# Patient Record
Sex: MALE | Race: WHITE | NOT HISPANIC OR LATINO
[De-identification: names, ages, dates, MRNs, and addresses within clinical notes are randomized per-mention and may not be internally consistent; named-entity substitution may affect disease eponyms.]

---

## 2017-04-13 PROBLEM — Z00.00 ENCOUNTER FOR PREVENTIVE HEALTH EXAMINATION: Status: ACTIVE | Noted: 2017-04-13

## 2017-04-26 ENCOUNTER — OTHER (OUTPATIENT)
Age: 77
End: 2017-04-26

## 2017-04-26 DIAGNOSIS — M25.569 PAIN IN UNSPECIFIED KNEE: ICD-10-CM

## 2017-05-04 ENCOUNTER — APPOINTMENT (OUTPATIENT)
Dept: ORTHOPEDIC SURGERY | Facility: CLINIC | Age: 77
End: 2017-05-04

## 2017-05-04 VITALS
SYSTOLIC BLOOD PRESSURE: 107 MMHG | HEART RATE: 77 BPM | BODY MASS INDEX: 32.07 KG/M2 | WEIGHT: 242 LBS | HEIGHT: 73 IN | DIASTOLIC BLOOD PRESSURE: 65 MMHG

## 2017-05-04 DIAGNOSIS — Z87.39 PERSONAL HISTORY OF OTHER DISEASES OF THE MUSCULOSKELETAL SYSTEM AND CONNECTIVE TISSUE: ICD-10-CM

## 2017-05-04 DIAGNOSIS — Z78.9 OTHER SPECIFIED HEALTH STATUS: ICD-10-CM

## 2017-05-04 DIAGNOSIS — M17.11 UNILATERAL PRIMARY OSTEOARTHRITIS, RIGHT KNEE: ICD-10-CM

## 2017-05-04 DIAGNOSIS — Z87.09 PERSONAL HISTORY OF OTHER DISEASES OF THE RESPIRATORY SYSTEM: ICD-10-CM

## 2017-05-04 DIAGNOSIS — Z86.69 PERSONAL HISTORY OF OTHER DISEASES OF THE NERVOUS SYSTEM AND SENSE ORGANS: ICD-10-CM

## 2017-05-04 DIAGNOSIS — Z86.73 PERSONAL HISTORY OF TRANSIENT ISCHEMIC ATTACK (TIA), AND CEREBRAL INFARCTION W/OUT RESIDUAL DEFICITS: ICD-10-CM

## 2017-05-04 RX ORDER — FAMOTIDINE 20 MG/1
20 TABLET, FILM COATED ORAL
Refills: 0 | Status: ACTIVE | COMMUNITY

## 2017-05-04 RX ORDER — FLUTICASONE PROPIONATE 50 MCG
50 SPRAY, SUSPENSION NASAL
Refills: 0 | Status: ACTIVE | COMMUNITY

## 2017-05-04 RX ORDER — ALBUTEROL SULFATE 90 UG/1
108 AEROSOL, METERED RESPIRATORY (INHALATION)
Refills: 0 | Status: ACTIVE | COMMUNITY

## 2017-05-04 RX ORDER — LORATADINE 10 MG/1
10 TABLET, ORALLY DISINTEGRATING ORAL
Refills: 0 | Status: ACTIVE | COMMUNITY

## 2017-05-04 RX ORDER — MULTIVIT-MIN/IRON/FOLIC ACID/K 18-600-40
CAPSULE ORAL
Refills: 0 | Status: ACTIVE | COMMUNITY

## 2017-05-04 RX ORDER — ASPIRIN 81 MG/1
81 TABLET ORAL
Refills: 0 | Status: ACTIVE | COMMUNITY

## 2017-05-04 RX ORDER — CLOPIDOGREL 75 MG/1
75 TABLET, FILM COATED ORAL
Refills: 0 | Status: ACTIVE | COMMUNITY

## 2017-05-04 RX ORDER — BUPROPION HYDROCHLORIDE 150 MG/1
150 TABLET, FILM COATED, EXTENDED RELEASE ORAL
Refills: 0 | Status: ACTIVE | COMMUNITY

## 2017-05-04 RX ORDER — ATORVASTATIN CALCIUM 20 MG/1
20 TABLET, FILM COATED ORAL
Refills: 0 | Status: ACTIVE | COMMUNITY

## 2017-07-04 ENCOUNTER — EMERGENCY (EMERGENCY)
Facility: HOSPITAL | Age: 77
LOS: 1 days | Discharge: HOME | End: 2017-07-04
Attending: EMERGENCY MEDICINE
Payer: MEDICARE

## 2017-07-04 VITALS
TEMPERATURE: 98 F | OXYGEN SATURATION: 95 % | HEIGHT: 73 IN | WEIGHT: 250 LBS | RESPIRATION RATE: 18 BRPM | SYSTOLIC BLOOD PRESSURE: 123 MMHG | HEART RATE: 57 BPM | DIASTOLIC BLOOD PRESSURE: 81 MMHG

## 2017-07-04 VITALS
OXYGEN SATURATION: 98 % | TEMPERATURE: 99 F | DIASTOLIC BLOOD PRESSURE: 88 MMHG | HEART RATE: 62 BPM | SYSTOLIC BLOOD PRESSURE: 159 MMHG | RESPIRATION RATE: 20 BRPM

## 2017-07-04 DIAGNOSIS — Z86.73 PERSONAL HISTORY OF TRANSIENT ISCHEMIC ATTACK (TIA), AND CEREBRAL INFARCTION WITHOUT RESIDUAL DEFICITS: Chronic | ICD-10-CM

## 2017-07-04 PROCEDURE — 70450 CT HEAD/BRAIN W/O DYE: CPT

## 2017-07-04 PROCEDURE — 71010: CPT | Mod: 26

## 2017-07-04 PROCEDURE — 73630 X-RAY EXAM OF FOOT: CPT

## 2017-07-04 PROCEDURE — 99284 EMERGENCY DEPT VISIT MOD MDM: CPT | Mod: 25

## 2017-07-04 PROCEDURE — 71045 X-RAY EXAM CHEST 1 VIEW: CPT

## 2017-07-04 PROCEDURE — 70450 CT HEAD/BRAIN W/O DYE: CPT | Mod: 26

## 2017-07-04 PROCEDURE — 73630 X-RAY EXAM OF FOOT: CPT | Mod: 26,LT

## 2017-07-04 PROCEDURE — 99284 EMERGENCY DEPT VISIT MOD MDM: CPT

## 2017-07-04 RX ORDER — ACETAMINOPHEN 500 MG
650 TABLET ORAL ONCE
Qty: 0 | Refills: 0 | Status: COMPLETED | OUTPATIENT
Start: 2017-07-04 | End: 2017-07-04

## 2017-07-04 RX ADMIN — Medication 650 MILLIGRAM(S): at 10:36

## 2017-07-04 NOTE — ED ADULT TRIAGE NOTE - CHIEF COMPLAINT QUOTE
BIBA, patient states he woke up mid rolling off his bed, denies LOC, denies hitting head. Reports left anterior foot discomfort, noted with swelling and abrasion. On Plavix, patient does not know why.

## 2017-07-04 NOTE — ED PROVIDER NOTE - OBJECTIVE STATEMENT
76M with h/o CVA on plavix ( mild R sided residual weakness), pw fall out of bed onto floor this morning, not c/o any pain. Pt states he went to the bathroom at 5 AM this morning and forgot to put the side rail up on his bed when he went back to sleep. He then woke up to himself falling onto the floor.  He thinks he may have bumped his head on the side table, but denies LOC.  Called [facility] for EMS and was able to stand up after being assisted.  States otherwise feeling well and did not feel lightheaded or dizzy prior to falling. 76M with h/o CVA on plavix ( mild R sided residual weakness), p/w fall out of bed onto floor this morning, not c/o any pain. Pt states he went to the bathroom at 5 AM this morning and forgot to put the side rail up on his bed when he went back to sleep. He then woke up to himself falling onto the floor.  He thinks he may have bumped his head on the side table, but denies LOC.  Called [facility] for EMS and was able to stand up after being assisted.  States otherwise feeling well and did not feel lightheaded or dizzy prior to falling. Has been feeling well recently and just saw PCP recently who did blood work and PE and found pt to be healthy as per pt

## 2017-07-04 NOTE — ED BEHAVIORAL HEALTH NOTE - BEHAVIORAL HEALTH NOTE
Social work note: Worker contacted by pt's RN that pt is medically stable for discharge per MD.  Pt requiring transportation via ambulette.  Worker called [employer] and obtained ambulette for pt.  Pt should be picked up at 1pm back to address listed on face sheet.  Reservation number [id number]. No other social work needs at this time.  RN aware.

## 2017-07-04 NOTE — ED PROVIDER NOTE - PHYSICAL EXAMINATION
No spinal tenderness  ~3cm round contusion to mid L foot with small overlying abrasion  full ROM bl/ hips

## 2017-07-04 NOTE — ED PROVIDER NOTE - MEDICAL DECISION MAKING DETAILS
Well appearing 76yoM pw cntusion to L foot s/p rolling out of bed after forgettig to put his bed rail up. No c/o any pain. Recent outpt blood work done w/ PCP last week and pt denies any other symptoms or recent complaints. Plan CT h, CXR, xray foot, liekly dc if tests negative.

## 2017-07-04 NOTE — ED ADULT NURSE NOTE - OBJECTIVE STATEMENT
Patient A&OX3, states he woke up mid rolling off his bed, Denies LOC and any pain at this time. Denies hitting his head. small contusions and  abrasion to left feet noted. Pt on Plavix. VSS. clear BBS. abd soft, nondistended and nontender. moving all extremities well.

## 2018-05-18 ENCOUNTER — EMERGENCY (EMERGENCY)
Facility: HOSPITAL | Age: 78
LOS: 1 days | Discharge: HOME | End: 2018-05-18
Attending: EMERGENCY MEDICINE
Payer: COMMERCIAL

## 2018-05-18 VITALS
SYSTOLIC BLOOD PRESSURE: 148 MMHG | TEMPERATURE: 98 F | DIASTOLIC BLOOD PRESSURE: 90 MMHG | RESPIRATION RATE: 18 BRPM | WEIGHT: 250 LBS | OXYGEN SATURATION: 98 % | HEIGHT: 73 IN | HEART RATE: 71 BPM

## 2018-05-18 DIAGNOSIS — Z86.73 PERSONAL HISTORY OF TRANSIENT ISCHEMIC ATTACK (TIA), AND CEREBRAL INFARCTION WITHOUT RESIDUAL DEFICITS: Chronic | ICD-10-CM

## 2018-05-18 PROCEDURE — 99283 EMERGENCY DEPT VISIT LOW MDM: CPT

## 2018-05-18 PROCEDURE — 99282 EMERGENCY DEPT VISIT SF MDM: CPT

## 2018-05-18 NOTE — CHART NOTE - NSCHARTNOTEFT_GEN_A_CORE
SOCIAL WORK NOTE:  DR FAM CALLED TO REQUEST AMBULETTE FOR PATIENT TO GO HOME AS PATIENT WHEELCHAIR BOUND AND HIS WHEELCHSIR IS AT HOME.  SAME ORDERED AND COVERED BY MEDICAID.  SCHEDULED FOR 3 PM PICKUP TO ADDRESS ON FACE SHEET.   AND RICHY AWARE.

## 2018-05-18 NOTE — ED PROVIDER NOTE - OBJECTIVE STATEMENT
76 y/o M pt presents to ED BIBA from home for chronic right knee pain s/p fall yesterday.  Pt states his knee gave out  and fell onto his buttock.  pt doesn't ambulate uses a wheelchair. Pt is followed by orthopedics and suppose to get knee replacement with Dr. Alex at [place] once insurance is approved. Pt denies new pain to right knee, chest pain, back pain, neck pain. LOC. no further complaints at this time.

## 2018-05-18 NOTE — ED ADULT NURSE NOTE - OBJECTIVE STATEMENT
Assumed pt care at 1250.  Pt a&ox3 reporting a fall at home, pt states he has a bad knee and is scheduled to have a knee replacement pending approval.  Pt denies LOC, denies hitting injured knee, denies any c/o pain at this time, no acute s/s of respiratory distress noted or reported, will continue to monitor

## 2018-05-18 NOTE — ED ADULT TRIAGE NOTE - CHIEF COMPLAINT QUOTE
Patient brought in by ambulance A/Ox3, patient was trying to put his pants on this morning and lost his balance and fell injuring his right knee.

## 2018-07-28 PROBLEM — Z86.73 HISTORY OF STROKE: Status: RESOLVED | Noted: 2017-05-04 | Resolved: 2018-07-28

## 2019-07-06 ENCOUNTER — EMERGENCY (EMERGENCY)
Facility: HOSPITAL | Age: 79
LOS: 1 days | Discharge: HOME | End: 2019-07-06
Attending: EMERGENCY MEDICINE
Payer: COMMERCIAL

## 2019-07-06 VITALS
SYSTOLIC BLOOD PRESSURE: 103 MMHG | HEART RATE: 58 BPM | OXYGEN SATURATION: 98 % | DIASTOLIC BLOOD PRESSURE: 65 MMHG | RESPIRATION RATE: 17 BRPM | HEIGHT: 73 IN | WEIGHT: 240.97 LBS | TEMPERATURE: 98 F

## 2019-07-06 DIAGNOSIS — Z86.73 PERSONAL HISTORY OF TRANSIENT ISCHEMIC ATTACK (TIA), AND CEREBRAL INFARCTION WITHOUT RESIDUAL DEFICITS: Chronic | ICD-10-CM

## 2019-07-06 PROCEDURE — 99282 EMERGENCY DEPT VISIT SF MDM: CPT

## 2019-07-06 PROCEDURE — 99283 EMERGENCY DEPT VISIT LOW MDM: CPT

## 2019-07-06 NOTE — ED PROVIDER NOTE - PROGRESS NOTE DETAILS
At home pt has call bell and home aid. At home pt has call bell and home aid, refusing to stay. States t he called EMS earlier assistance to get up. Actively refusing to stay in ED, wants to go home. Ambulance activated.

## 2019-07-06 NOTE — ED PROVIDER NOTE - CLINICAL SUMMARY MEDICAL DECISION MAKING FREE TEXT BOX
77 y/o M, familiar with his own medical history, with mechanical fall at home, no head injury. Patient refusing to stay, wishing to go home. Planned to call ambulance to transport to home.

## 2019-07-06 NOTE — ED PROVIDER NOTE - OBJECTIVE STATEMENT
Hx source: Patient   77 y/o M, with a PMHx of arthritis and COPD, who presents to the ED with a chief complaint of a fall at home earlier this evening. He noted that when he fell, books scraped his nose. Denies hitting his head or loss of consciousness. Denies use of blood thinners.     He used his [employer] for assistance to get up off the floor and was subsequently transferred.     He reports that he is going to have right knee denervation procedure on July 23rd. He has an aid that comes in the morning for further assistance. He uses a wheelchair at home for ambulation. He is a non-smoker.

## 2019-07-06 NOTE — ED ADULT NURSE NOTE - OBJECTIVE STATEMENT
pt has severe osteoarthritis, left knee was replaced but states right knee is the one that gives out a lot. 7/23/19 pt is going to get procedure done to right knee to "deadened the nerve to right knee." right knee swollen when compared to left.  pt states he is mostly wheelchair bound because pt feels weak and right knee has been getting worse. pt states he has an aid that comes to visit him at home. pt denies LOC, denies blood thinners.

## 2019-07-06 NOTE — ED PROVIDER NOTE - PHYSICAL EXAMINATION
Constitutional : Appears comfortably, talking in full sentences  Head :NC AT , no swelling  Eyes :eomi, no swelling  Mouth :mm moist,  Neck : supple, trachea in midline  Chest :Randolph air entry, symm chest expansion, no distress  Heart :S1 S2 distant  Abdomen :abd soft, non tender  Musc/Skel :ext no swelling, no deformity, no spine tenderness, distal pulses present  Neuro  :AAO 3 no focal deficits Constitutional : Appears comfortably, talking in slurred speech.  Pt refused to walk. Unable to walk even with walker.   Head :NC AT , no swelling  Eyes :eomi, no swelling  Mouth :mm moist,  Neck : supple, trachea in midline  Chest :Randolph air entry, symm chest expansion, No respiratory distress.  Heart :S1 S2 distant  Abdomen :abd soft, non tender  Musc/Skel :ext Asymmetry swelling of R knee, other joint deformities, no spine tenderness, distal pulses present  Neuro  :AAO 3 no focal deficits Constitutional : Appears comfortably, talking in slurred speech.  Pt refused to walk. (Unable to walk even with walker)   Head :NC AT , no swelling. Abrasion to nose   Eyes :eomi, no swelling  Mouth :mm moist,  Neck : supple, trachea in midline  Chest :Randolph air entry, symm chest expansion, No respiratory distress.  Heart :S1 S2 distant  Abdomen :abd soft, non tender  Musc/Skel :ext Asymmetry swelling of R knee, other joint deformities, no spine tenderness, distal pulses present  Neuro  :AAO 3 no focal deficits

## 2019-07-06 NOTE — ED PROVIDER NOTE - NS ED ROS FT
no weight change, no fever or chills  no recent travel, no recent abox, no sick contacts  no recent change in medications  no rash, no bruises  no visual changes no eye discharge  no cough cold or congestion,   no sob, no chest pain  follows with cardiology  no stress test, no cath  no orthopnea, no pnd  no abd pain, no n/v/d  no endoscopy, no colonoscopy  no hematuria, no change in urinary habits  + joint pain,  no deformity  no headache, no paresthesia no weight change, no fever or chills  no recent travel, no recent abox, no sick contacts  no recent change in medications  no rash, no bruises, + abrasion to nose   no visual changes no eye discharge  no cough cold or congestion,   no sob, no chest pain  follows with cardiology  no stress test, no cath  no orthopnea, no pnd  no abd pain, no n/v/d  no endoscopy, no colonoscopy  no hematuria, no change in urinary habits  + joint pain,  no deformity  no headache, no paresthesia

## 2019-07-07 VITALS
OXYGEN SATURATION: 95 % | TEMPERATURE: 98 F | RESPIRATION RATE: 18 BRPM | SYSTOLIC BLOOD PRESSURE: 116 MMHG | HEART RATE: 67 BPM | DIASTOLIC BLOOD PRESSURE: 70 MMHG

## 2019-07-07 PROBLEM — I10 ESSENTIAL (PRIMARY) HYPERTENSION: Chronic | Status: ACTIVE | Noted: 2017-07-04

## 2020-01-01 ENCOUNTER — INPATIENT (INPATIENT)
Facility: HOSPITAL | Age: 80
LOS: 1 days | DRG: 871 | End: 2020-10-13
Attending: INTERNAL MEDICINE | Admitting: HOSPITALIST
Payer: MEDICARE

## 2020-01-01 ENCOUNTER — TRANSCRIPTION ENCOUNTER (OUTPATIENT)
Age: 80
End: 2020-01-01

## 2020-01-01 ENCOUNTER — APPOINTMENT (OUTPATIENT)
Dept: ELECTROPHYSIOLOGY | Facility: CLINIC | Age: 80
End: 2020-01-01

## 2020-01-01 ENCOUNTER — INPATIENT (INPATIENT)
Facility: HOSPITAL | Age: 80
LOS: 12 days | Discharge: HOME | DRG: 224 | End: 2020-07-29
Attending: INTERNAL MEDICINE | Admitting: HOSPITALIST
Payer: MEDICARE

## 2020-01-01 ENCOUNTER — INPATIENT (INPATIENT)
Facility: HOSPITAL | Age: 80
LOS: 3 days | Discharge: HOME | DRG: 246 | End: 2020-06-23
Attending: INTERNAL MEDICINE | Admitting: HOSPITALIST
Payer: MEDICARE

## 2020-01-01 VITALS
DIASTOLIC BLOOD PRESSURE: 44 MMHG | SYSTOLIC BLOOD PRESSURE: 81 MMHG | HEART RATE: 73 BPM | TEMPERATURE: 97 F | RESPIRATION RATE: 22 BRPM | OXYGEN SATURATION: 94 %

## 2020-01-01 VITALS — DIASTOLIC BLOOD PRESSURE: 65 MMHG | SYSTOLIC BLOOD PRESSURE: 117 MMHG | HEART RATE: 59 BPM

## 2020-01-01 VITALS — RESPIRATION RATE: 26 BRPM | HEART RATE: 170 BPM | WEIGHT: 250 LBS | HEIGHT: 73 IN

## 2020-01-01 VITALS
TEMPERATURE: 98 F | RESPIRATION RATE: 18 BRPM | HEART RATE: 70 BPM | SYSTOLIC BLOOD PRESSURE: 136 MMHG | DIASTOLIC BLOOD PRESSURE: 76 MMHG | OXYGEN SATURATION: 95 %

## 2020-01-01 VITALS
OXYGEN SATURATION: 91 % | HEART RATE: 76 BPM | WEIGHT: 214.95 LBS | HEIGHT: 72 IN | DIASTOLIC BLOOD PRESSURE: 52 MMHG | SYSTOLIC BLOOD PRESSURE: 86 MMHG | RESPIRATION RATE: 22 BRPM

## 2020-01-01 VITALS — OXYGEN SATURATION: 69 %

## 2020-01-01 DIAGNOSIS — L89.314 PRESSURE ULCER OF RIGHT BUTTOCK, STAGE 4: ICD-10-CM

## 2020-01-01 DIAGNOSIS — I50.9 HEART FAILURE, UNSPECIFIED: ICD-10-CM

## 2020-01-01 DIAGNOSIS — Z86.74 PERSONAL HISTORY OF SUDDEN CARDIAC ARREST: ICD-10-CM

## 2020-01-01 DIAGNOSIS — Z86.79 PERSONAL HISTORY OF OTHER DISEASES OF THE CIRCULATORY SYSTEM: ICD-10-CM

## 2020-01-01 DIAGNOSIS — A41.9 SEPSIS, UNSPECIFIED ORGANISM: ICD-10-CM

## 2020-01-01 DIAGNOSIS — E78.5 HYPERLIPIDEMIA, UNSPECIFIED: ICD-10-CM

## 2020-01-01 DIAGNOSIS — J96.01 ACUTE RESPIRATORY FAILURE WITH HYPOXIA: ICD-10-CM

## 2020-01-01 DIAGNOSIS — I46.9 CARDIAC ARREST, CAUSE UNSPECIFIED: ICD-10-CM

## 2020-01-01 DIAGNOSIS — Z86.59 PERSONAL HISTORY OF OTHER MENTAL AND BEHAVIORAL DISORDERS: ICD-10-CM

## 2020-01-01 DIAGNOSIS — Z87.09 PERSONAL HISTORY OF OTHER DISEASES OF THE RESPIRATORY SYSTEM: ICD-10-CM

## 2020-01-01 DIAGNOSIS — N42.9 DISORDER OF PROSTATE, UNSPECIFIED: ICD-10-CM

## 2020-01-01 DIAGNOSIS — R00.0 TACHYCARDIA, UNSPECIFIED: ICD-10-CM

## 2020-01-01 DIAGNOSIS — Z86.73 PERSONAL HISTORY OF TRANSIENT ISCHEMIC ATTACK (TIA), AND CEREBRAL INFARCTION WITHOUT RESIDUAL DEFICITS: Chronic | ICD-10-CM

## 2020-01-01 DIAGNOSIS — J18.9 PNEUMONIA, UNSPECIFIED ORGANISM: ICD-10-CM

## 2020-01-01 DIAGNOSIS — I47.2 VENTRICULAR TACHYCARDIA: ICD-10-CM

## 2020-01-01 DIAGNOSIS — J44.1 CHRONIC OBSTRUCTIVE PULMONARY DISEASE WITH (ACUTE) EXACERBATION: ICD-10-CM

## 2020-01-01 DIAGNOSIS — I25.10 ATHEROSCLEROTIC HEART DISEASE OF NATIVE CORONARY ARTERY WITHOUT ANGINA PECTORIS: ICD-10-CM

## 2020-01-01 DIAGNOSIS — I44.2 ATRIOVENTRICULAR BLOCK, COMPLETE: ICD-10-CM

## 2020-01-01 DIAGNOSIS — Z98.61 CORONARY ANGIOPLASTY STATUS: ICD-10-CM

## 2020-01-01 DIAGNOSIS — I95.9 HYPOTENSION, UNSPECIFIED: ICD-10-CM

## 2020-01-01 DIAGNOSIS — I48.91 UNSPECIFIED ATRIAL FIBRILLATION: ICD-10-CM

## 2020-01-01 LAB
-  AMPICILLIN/SULBACTAM: SIGNIFICANT CHANGE UP
-  CEFAZOLIN: SIGNIFICANT CHANGE UP
-  CLINDAMYCIN: SIGNIFICANT CHANGE UP
-  ERYTHROMYCIN: SIGNIFICANT CHANGE UP
-  GENTAMICIN: SIGNIFICANT CHANGE UP
-  OXACILLIN: SIGNIFICANT CHANGE UP
-  RIFAMPIN: SIGNIFICANT CHANGE UP
-  TETRACYCLINE: SIGNIFICANT CHANGE UP
-  TRIMETHOPRIM/SULFAMETHOXAZOLE: SIGNIFICANT CHANGE UP
-  VANCOMYCIN: SIGNIFICANT CHANGE UP
A1C WITH ESTIMATED AVERAGE GLUCOSE RESULT: 5 % — SIGNIFICANT CHANGE UP (ref 4–5.6)
ABO RH CONFIRMATION: SIGNIFICANT CHANGE UP
ALBUMIN SERPL ELPH-MCNC: 2.7 G/DL — LOW (ref 3.3–5.2)
ALBUMIN SERPL ELPH-MCNC: 2.9 G/DL — LOW (ref 3.3–5.2)
ALBUMIN SERPL ELPH-MCNC: 3.1 G/DL — LOW (ref 3.3–5.2)
ALBUMIN SERPL ELPH-MCNC: 3.1 G/DL — LOW (ref 3.3–5.2)
ALBUMIN SERPL ELPH-MCNC: 3.2 G/DL — LOW (ref 3.3–5.2)
ALBUMIN SERPL ELPH-MCNC: 3.3 G/DL — SIGNIFICANT CHANGE UP (ref 3.3–5.2)
ALBUMIN SERPL ELPH-MCNC: 3.6 G/DL — SIGNIFICANT CHANGE UP (ref 3.3–5.2)
ALBUMIN SERPL ELPH-MCNC: 3.7 G/DL — SIGNIFICANT CHANGE UP (ref 3.3–5.2)
ALP SERPL-CCNC: 57 U/L — SIGNIFICANT CHANGE UP (ref 40–120)
ALP SERPL-CCNC: 62 U/L — SIGNIFICANT CHANGE UP (ref 40–120)
ALP SERPL-CCNC: 62 U/L — SIGNIFICANT CHANGE UP (ref 40–120)
ALP SERPL-CCNC: 64 U/L — SIGNIFICANT CHANGE UP (ref 40–120)
ALP SERPL-CCNC: 64 U/L — SIGNIFICANT CHANGE UP (ref 40–120)
ALP SERPL-CCNC: 69 U/L — SIGNIFICANT CHANGE UP (ref 40–120)
ALP SERPL-CCNC: 73 U/L — SIGNIFICANT CHANGE UP (ref 40–120)
ALP SERPL-CCNC: 74 U/L — SIGNIFICANT CHANGE UP (ref 40–120)
ALP SERPL-CCNC: 77 U/L — SIGNIFICANT CHANGE UP (ref 40–120)
ALP SERPL-CCNC: 79 U/L — SIGNIFICANT CHANGE UP (ref 40–120)
ALP SERPL-CCNC: 81 U/L — SIGNIFICANT CHANGE UP (ref 40–120)
ALP SERPL-CCNC: 82 U/L — SIGNIFICANT CHANGE UP (ref 40–120)
ALT FLD-CCNC: 11 U/L — SIGNIFICANT CHANGE UP
ALT FLD-CCNC: 7 U/L — SIGNIFICANT CHANGE UP
ALT FLD-CCNC: 8 U/L — SIGNIFICANT CHANGE UP
ALT FLD-CCNC: 9 U/L — SIGNIFICANT CHANGE UP
ALT FLD-CCNC: 9 U/L — SIGNIFICANT CHANGE UP
ANION GAP SERPL CALC-SCNC: 10 MMOL/L — SIGNIFICANT CHANGE UP (ref 5–17)
ANION GAP SERPL CALC-SCNC: 10 MMOL/L — SIGNIFICANT CHANGE UP (ref 5–17)
ANION GAP SERPL CALC-SCNC: 11 MMOL/L — SIGNIFICANT CHANGE UP (ref 5–17)
ANION GAP SERPL CALC-SCNC: 12 MMOL/L — SIGNIFICANT CHANGE UP (ref 5–17)
ANION GAP SERPL CALC-SCNC: 13 MMOL/L — SIGNIFICANT CHANGE UP (ref 5–17)
ANION GAP SERPL CALC-SCNC: 14 MMOL/L — SIGNIFICANT CHANGE UP (ref 5–17)
ANION GAP SERPL CALC-SCNC: 15 MMOL/L — SIGNIFICANT CHANGE UP (ref 5–17)
ANION GAP SERPL CALC-SCNC: 16 MMOL/L — SIGNIFICANT CHANGE UP (ref 5–17)
ANION GAP SERPL CALC-SCNC: 16 MMOL/L — SIGNIFICANT CHANGE UP (ref 5–17)
ANION GAP SERPL CALC-SCNC: 23 MMOL/L — HIGH (ref 5–17)
ANION GAP SERPL CALC-SCNC: 9 MMOL/L — SIGNIFICANT CHANGE UP (ref 5–17)
ANISOCYTOSIS BLD QL: SLIGHT — SIGNIFICANT CHANGE UP
ANISOCYTOSIS BLD QL: SLIGHT — SIGNIFICANT CHANGE UP
APPEARANCE UR: CLEAR — SIGNIFICANT CHANGE UP
APPEARANCE UR: CLEAR — SIGNIFICANT CHANGE UP
APTT BLD: 26.8 SEC — LOW (ref 27.5–35.5)
APTT BLD: 33.5 SEC — SIGNIFICANT CHANGE UP (ref 27.5–35.5)
APTT BLD: 34.5 SEC — SIGNIFICANT CHANGE UP (ref 27.5–36.3)
APTT BLD: 39.2 SEC — HIGH (ref 27.5–35.5)
AST SERPL-CCNC: 11 U/L — SIGNIFICANT CHANGE UP
AST SERPL-CCNC: 12 U/L — SIGNIFICANT CHANGE UP
AST SERPL-CCNC: 13 U/L — SIGNIFICANT CHANGE UP
AST SERPL-CCNC: 13 U/L — SIGNIFICANT CHANGE UP
AST SERPL-CCNC: 15 U/L — SIGNIFICANT CHANGE UP
AST SERPL-CCNC: 28 U/L — SIGNIFICANT CHANGE UP
AST SERPL-CCNC: 28 U/L — SIGNIFICANT CHANGE UP
AST SERPL-CCNC: 33 U/L — SIGNIFICANT CHANGE UP
AST SERPL-CCNC: 34 U/L — SIGNIFICANT CHANGE UP
AST SERPL-CCNC: 34 U/L — SIGNIFICANT CHANGE UP
AST SERPL-CCNC: 53 U/L — HIGH
BACTERIA # UR AUTO: ABNORMAL
BACTERIA # UR AUTO: NEGATIVE — SIGNIFICANT CHANGE UP
BASE EXCESS BLDA CALC-SCNC: -3.6 MMOL/L — LOW (ref -3–3)
BASE EXCESS BLDA CALC-SCNC: -4.7 MMOL/L — LOW (ref -2–2)
BASE EXCESS BLDA CALC-SCNC: 1.5 MMOL/L — SIGNIFICANT CHANGE UP (ref -3–3)
BASOPHILS # BLD AUTO: 0 K/UL — SIGNIFICANT CHANGE UP (ref 0–0.2)
BASOPHILS # BLD AUTO: 0 K/UL — SIGNIFICANT CHANGE UP (ref 0–0.2)
BASOPHILS # BLD AUTO: 0.01 K/UL — SIGNIFICANT CHANGE UP (ref 0–0.2)
BASOPHILS # BLD AUTO: 0.01 K/UL — SIGNIFICANT CHANGE UP (ref 0–0.2)
BASOPHILS # BLD AUTO: 0.02 K/UL — SIGNIFICANT CHANGE UP (ref 0–0.2)
BASOPHILS # BLD AUTO: 0.03 K/UL — SIGNIFICANT CHANGE UP (ref 0–0.2)
BASOPHILS # BLD AUTO: 0.06 K/UL — SIGNIFICANT CHANGE UP (ref 0–0.2)
BASOPHILS NFR BLD AUTO: 0 % — SIGNIFICANT CHANGE UP (ref 0–2)
BASOPHILS NFR BLD AUTO: 0 % — SIGNIFICANT CHANGE UP (ref 0–2)
BASOPHILS NFR BLD AUTO: 0.1 % — SIGNIFICANT CHANGE UP (ref 0–2)
BASOPHILS NFR BLD AUTO: 0.1 % — SIGNIFICANT CHANGE UP (ref 0–2)
BASOPHILS NFR BLD AUTO: 0.2 % — SIGNIFICANT CHANGE UP (ref 0–2)
BASOPHILS NFR BLD AUTO: 0.3 % — SIGNIFICANT CHANGE UP (ref 0–2)
BASOPHILS NFR BLD AUTO: 0.3 % — SIGNIFICANT CHANGE UP (ref 0–2)
BASOPHILS NFR BLD AUTO: 0.5 % — SIGNIFICANT CHANGE UP (ref 0–2)
BASOPHILS NFR BLD AUTO: 0.5 % — SIGNIFICANT CHANGE UP (ref 0–2)
BILIRUB SERPL-MCNC: 0.4 MG/DL — SIGNIFICANT CHANGE UP (ref 0.4–2)
BILIRUB SERPL-MCNC: 0.5 MG/DL — SIGNIFICANT CHANGE UP (ref 0.4–2)
BILIRUB SERPL-MCNC: 0.5 MG/DL — SIGNIFICANT CHANGE UP (ref 0.4–2)
BILIRUB SERPL-MCNC: 0.6 MG/DL — SIGNIFICANT CHANGE UP (ref 0.4–2)
BILIRUB SERPL-MCNC: 0.7 MG/DL — SIGNIFICANT CHANGE UP (ref 0.4–2)
BILIRUB SERPL-MCNC: 0.7 MG/DL — SIGNIFICANT CHANGE UP (ref 0.4–2)
BILIRUB SERPL-MCNC: 0.9 MG/DL — SIGNIFICANT CHANGE UP (ref 0.4–2)
BILIRUB SERPL-MCNC: 1 MG/DL — SIGNIFICANT CHANGE UP (ref 0.4–2)
BILIRUB SERPL-MCNC: 1.1 MG/DL — SIGNIFICANT CHANGE UP (ref 0.4–2)
BILIRUB SERPL-MCNC: 1.2 MG/DL — SIGNIFICANT CHANGE UP (ref 0.4–2)
BILIRUB UR-MCNC: ABNORMAL
BILIRUB UR-MCNC: NEGATIVE — SIGNIFICANT CHANGE UP
BLD GP AB SCN SERPL QL: SIGNIFICANT CHANGE UP
BLD GP AB SCN SERPL QL: SIGNIFICANT CHANGE UP
BLOOD GAS COMMENTS ARTERIAL: SIGNIFICANT CHANGE UP
BUN SERPL-MCNC: 16 MG/DL — SIGNIFICANT CHANGE UP (ref 8–20)
BUN SERPL-MCNC: 17 MG/DL — SIGNIFICANT CHANGE UP (ref 8–20)
BUN SERPL-MCNC: 18 MG/DL — SIGNIFICANT CHANGE UP (ref 8–20)
BUN SERPL-MCNC: 18 MG/DL — SIGNIFICANT CHANGE UP (ref 8–20)
BUN SERPL-MCNC: 19 MG/DL — SIGNIFICANT CHANGE UP (ref 8–20)
BUN SERPL-MCNC: 19 MG/DL — SIGNIFICANT CHANGE UP (ref 8–20)
BUN SERPL-MCNC: 21 MG/DL — HIGH (ref 8–20)
BUN SERPL-MCNC: 22 MG/DL — HIGH (ref 8–20)
BUN SERPL-MCNC: 22 MG/DL — HIGH (ref 8–20)
BUN SERPL-MCNC: 24 MG/DL — HIGH (ref 8–20)
BUN SERPL-MCNC: 25 MG/DL — HIGH (ref 8–20)
BUN SERPL-MCNC: 25 MG/DL — HIGH (ref 8–20)
BUN SERPL-MCNC: 27 MG/DL — HIGH (ref 8–20)
BUN SERPL-MCNC: 28 MG/DL — HIGH (ref 8–20)
BUN SERPL-MCNC: 34 MG/DL — HIGH (ref 8–20)
BUN SERPL-MCNC: 52 MG/DL — HIGH (ref 8–20)
BUN SERPL-MCNC: 72 MG/DL — HIGH (ref 8–20)
BUN SERPL-MCNC: 75 MG/DL — HIGH (ref 8–20)
CALCIUM SERPL-MCNC: 10 MG/DL — SIGNIFICANT CHANGE UP (ref 8.6–10.2)
CALCIUM SERPL-MCNC: 10 MG/DL — SIGNIFICANT CHANGE UP (ref 8.6–10.2)
CALCIUM SERPL-MCNC: 7.7 MG/DL — LOW (ref 8.6–10.2)
CALCIUM SERPL-MCNC: 8.2 MG/DL — LOW (ref 8.6–10.2)
CALCIUM SERPL-MCNC: 8.2 MG/DL — LOW (ref 8.6–10.2)
CALCIUM SERPL-MCNC: 8.3 MG/DL — LOW (ref 8.6–10.2)
CALCIUM SERPL-MCNC: 8.4 MG/DL — LOW (ref 8.6–10.2)
CALCIUM SERPL-MCNC: 8.5 MG/DL — LOW (ref 8.6–10.2)
CALCIUM SERPL-MCNC: 8.6 MG/DL — SIGNIFICANT CHANGE UP (ref 8.6–10.2)
CALCIUM SERPL-MCNC: 8.7 MG/DL — SIGNIFICANT CHANGE UP (ref 8.6–10.2)
CALCIUM SERPL-MCNC: 8.7 MG/DL — SIGNIFICANT CHANGE UP (ref 8.6–10.2)
CALCIUM SERPL-MCNC: 8.8 MG/DL — SIGNIFICANT CHANGE UP (ref 8.6–10.2)
CALCIUM SERPL-MCNC: 8.8 MG/DL — SIGNIFICANT CHANGE UP (ref 8.6–10.2)
CALCIUM SERPL-MCNC: 8.9 MG/DL — SIGNIFICANT CHANGE UP (ref 8.6–10.2)
CALCIUM SERPL-MCNC: 9 MG/DL — SIGNIFICANT CHANGE UP (ref 8.6–10.2)
CALCIUM SERPL-MCNC: 9 MG/DL — SIGNIFICANT CHANGE UP (ref 8.6–10.2)
CALCIUM SERPL-MCNC: 9.1 MG/DL — SIGNIFICANT CHANGE UP (ref 8.6–10.2)
CALCIUM SERPL-MCNC: 9.3 MG/DL — SIGNIFICANT CHANGE UP (ref 8.6–10.2)
CALCIUM SERPL-MCNC: 9.6 MG/DL — SIGNIFICANT CHANGE UP (ref 8.6–10.2)
CHLORIDE SERPL-SCNC: 100 MMOL/L — SIGNIFICANT CHANGE UP (ref 98–107)
CHLORIDE SERPL-SCNC: 100 MMOL/L — SIGNIFICANT CHANGE UP (ref 98–107)
CHLORIDE SERPL-SCNC: 101 MMOL/L — SIGNIFICANT CHANGE UP (ref 98–107)
CHLORIDE SERPL-SCNC: 102 MMOL/L — SIGNIFICANT CHANGE UP (ref 98–107)
CHLORIDE SERPL-SCNC: 104 MMOL/L — SIGNIFICANT CHANGE UP (ref 98–107)
CHLORIDE SERPL-SCNC: 95 MMOL/L — LOW (ref 98–107)
CHLORIDE SERPL-SCNC: 96 MMOL/L — LOW (ref 98–107)
CHLORIDE SERPL-SCNC: 98 MMOL/L — SIGNIFICANT CHANGE UP (ref 98–107)
CHLORIDE SERPL-SCNC: 99 MMOL/L — SIGNIFICANT CHANGE UP (ref 98–107)
CHOLEST SERPL-MCNC: 101 MG/DL — LOW (ref 110–199)
CK MB CFR SERPL CALC: 35.5 NG/ML — HIGH (ref 0–6.7)
CK MB CFR SERPL CALC: 61.8 NG/ML — HIGH (ref 0–6.7)
CK SERPL-CCNC: 206 U/L — HIGH (ref 30–200)
CK SERPL-CCNC: 348 U/L — HIGH (ref 30–200)
CO2 SERPL-SCNC: 20 MMOL/L — LOW (ref 22–29)
CO2 SERPL-SCNC: 22 MMOL/L — SIGNIFICANT CHANGE UP (ref 22–29)
CO2 SERPL-SCNC: 23 MMOL/L — SIGNIFICANT CHANGE UP (ref 22–29)
CO2 SERPL-SCNC: 24 MMOL/L — SIGNIFICANT CHANGE UP (ref 22–29)
CO2 SERPL-SCNC: 25 MMOL/L — SIGNIFICANT CHANGE UP (ref 22–29)
CO2 SERPL-SCNC: 25 MMOL/L — SIGNIFICANT CHANGE UP (ref 22–29)
CO2 SERPL-SCNC: 26 MMOL/L — SIGNIFICANT CHANGE UP (ref 22–29)
CO2 SERPL-SCNC: 27 MMOL/L — SIGNIFICANT CHANGE UP (ref 22–29)
CO2 SERPL-SCNC: 27 MMOL/L — SIGNIFICANT CHANGE UP (ref 22–29)
CO2 SERPL-SCNC: 29 MMOL/L — SIGNIFICANT CHANGE UP (ref 22–29)
COD CRY URNS QL: ABNORMAL
COLOR SPEC: YELLOW — SIGNIFICANT CHANGE UP
COLOR SPEC: YELLOW — SIGNIFICANT CHANGE UP
CREAT SERPL-MCNC: 0.62 MG/DL — SIGNIFICANT CHANGE UP (ref 0.5–1.3)
CREAT SERPL-MCNC: 0.72 MG/DL — SIGNIFICANT CHANGE UP (ref 0.5–1.3)
CREAT SERPL-MCNC: 0.74 MG/DL — SIGNIFICANT CHANGE UP (ref 0.5–1.3)
CREAT SERPL-MCNC: 0.77 MG/DL — SIGNIFICANT CHANGE UP (ref 0.5–1.3)
CREAT SERPL-MCNC: 0.78 MG/DL — SIGNIFICANT CHANGE UP (ref 0.5–1.3)
CREAT SERPL-MCNC: 0.83 MG/DL — SIGNIFICANT CHANGE UP (ref 0.5–1.3)
CREAT SERPL-MCNC: 0.83 MG/DL — SIGNIFICANT CHANGE UP (ref 0.5–1.3)
CREAT SERPL-MCNC: 0.84 MG/DL — SIGNIFICANT CHANGE UP (ref 0.5–1.3)
CREAT SERPL-MCNC: 0.86 MG/DL — SIGNIFICANT CHANGE UP (ref 0.5–1.3)
CREAT SERPL-MCNC: 0.91 MG/DL — SIGNIFICANT CHANGE UP (ref 0.5–1.3)
CREAT SERPL-MCNC: 0.96 MG/DL — SIGNIFICANT CHANGE UP (ref 0.5–1.3)
CREAT SERPL-MCNC: 0.98 MG/DL — SIGNIFICANT CHANGE UP (ref 0.5–1.3)
CREAT SERPL-MCNC: 1.04 MG/DL — SIGNIFICANT CHANGE UP (ref 0.5–1.3)
CREAT SERPL-MCNC: 1.04 MG/DL — SIGNIFICANT CHANGE UP (ref 0.5–1.3)
CREAT SERPL-MCNC: 1.05 MG/DL — SIGNIFICANT CHANGE UP (ref 0.5–1.3)
CREAT SERPL-MCNC: 1.1 MG/DL — SIGNIFICANT CHANGE UP (ref 0.5–1.3)
CREAT SERPL-MCNC: 1.19 MG/DL — SIGNIFICANT CHANGE UP (ref 0.5–1.3)
CREAT SERPL-MCNC: 1.21 MG/DL — SIGNIFICANT CHANGE UP (ref 0.5–1.3)
CREAT SERPL-MCNC: 1.26 MG/DL — SIGNIFICANT CHANGE UP (ref 0.5–1.3)
CREAT SERPL-MCNC: 1.51 MG/DL — HIGH (ref 0.5–1.3)
CREAT SERPL-MCNC: 1.57 MG/DL — HIGH (ref 0.5–1.3)
CREAT SERPL-MCNC: 1.8 MG/DL — HIGH (ref 0.5–1.3)
CREAT SERPL-MCNC: 2.2 MG/DL — HIGH (ref 0.5–1.3)
CULTURE RESULTS: NO GROWTH — SIGNIFICANT CHANGE UP
CULTURE RESULTS: SIGNIFICANT CHANGE UP
DIFF PNL FLD: ABNORMAL
DIFF PNL FLD: ABNORMAL
ELLIPTOCYTES BLD QL SMEAR: SLIGHT — SIGNIFICANT CHANGE UP
EOSINOPHIL # BLD AUTO: 0 K/UL — SIGNIFICANT CHANGE UP (ref 0–0.5)
EOSINOPHIL # BLD AUTO: 0.05 K/UL — SIGNIFICANT CHANGE UP (ref 0–0.5)
EOSINOPHIL # BLD AUTO: 0.05 K/UL — SIGNIFICANT CHANGE UP (ref 0–0.5)
EOSINOPHIL # BLD AUTO: 0.06 K/UL — SIGNIFICANT CHANGE UP (ref 0–0.5)
EOSINOPHIL # BLD AUTO: 0.08 K/UL — SIGNIFICANT CHANGE UP (ref 0–0.5)
EOSINOPHIL # BLD AUTO: 0.15 K/UL — SIGNIFICANT CHANGE UP (ref 0–0.5)
EOSINOPHIL # BLD AUTO: 0.23 K/UL — SIGNIFICANT CHANGE UP (ref 0–0.5)
EOSINOPHIL # BLD AUTO: 0.27 K/UL — SIGNIFICANT CHANGE UP (ref 0–0.5)
EOSINOPHIL # BLD AUTO: 0.37 K/UL — SIGNIFICANT CHANGE UP (ref 0–0.5)
EOSINOPHIL NFR BLD AUTO: 0 % — SIGNIFICANT CHANGE UP (ref 0–6)
EOSINOPHIL NFR BLD AUTO: 0.6 % — SIGNIFICANT CHANGE UP (ref 0–6)
EOSINOPHIL NFR BLD AUTO: 0.7 % — SIGNIFICANT CHANGE UP (ref 0–6)
EOSINOPHIL NFR BLD AUTO: 0.9 % — SIGNIFICANT CHANGE UP (ref 0–6)
EOSINOPHIL NFR BLD AUTO: 1 % — SIGNIFICANT CHANGE UP (ref 0–6)
EOSINOPHIL NFR BLD AUTO: 1.2 % — SIGNIFICANT CHANGE UP (ref 0–6)
EOSINOPHIL NFR BLD AUTO: 3.1 % — SIGNIFICANT CHANGE UP (ref 0–6)
EOSINOPHIL NFR BLD AUTO: 3.7 % — SIGNIFICANT CHANGE UP (ref 0–6)
EOSINOPHIL NFR BLD AUTO: 3.8 % — SIGNIFICANT CHANGE UP (ref 0–6)
EPI CELLS # UR: NEGATIVE — SIGNIFICANT CHANGE UP
EPI CELLS # UR: SIGNIFICANT CHANGE UP
ESTIMATED AVERAGE GLUCOSE: 97 MG/DL — SIGNIFICANT CHANGE UP (ref 68–114)
GAS PNL BLDA: SIGNIFICANT CHANGE UP
GIANT PLATELETS BLD QL SMEAR: PRESENT — SIGNIFICANT CHANGE UP
GLUCOSE BLDC GLUCOMTR-MCNC: 131 MG/DL — HIGH (ref 70–99)
GLUCOSE BLDC GLUCOMTR-MCNC: 173 MG/DL — HIGH (ref 70–99)
GLUCOSE BLDC GLUCOMTR-MCNC: 198 MG/DL — HIGH (ref 70–99)
GLUCOSE BLDC GLUCOMTR-MCNC: 200 MG/DL — HIGH (ref 70–99)
GLUCOSE BLDC GLUCOMTR-MCNC: 216 MG/DL — HIGH (ref 70–99)
GLUCOSE BLDC GLUCOMTR-MCNC: 61 MG/DL — LOW (ref 70–99)
GLUCOSE BLDC GLUCOMTR-MCNC: 68 MG/DL — LOW (ref 70–99)
GLUCOSE SERPL-MCNC: 106 MG/DL — HIGH (ref 70–99)
GLUCOSE SERPL-MCNC: 109 MG/DL — HIGH (ref 70–99)
GLUCOSE SERPL-MCNC: 115 MG/DL — HIGH (ref 70–99)
GLUCOSE SERPL-MCNC: 119 MG/DL — HIGH (ref 70–99)
GLUCOSE SERPL-MCNC: 119 MG/DL — HIGH (ref 70–99)
GLUCOSE SERPL-MCNC: 120 MG/DL — HIGH (ref 70–99)
GLUCOSE SERPL-MCNC: 128 MG/DL — HIGH (ref 70–99)
GLUCOSE SERPL-MCNC: 133 MG/DL — HIGH (ref 70–99)
GLUCOSE SERPL-MCNC: 134 MG/DL — HIGH (ref 70–99)
GLUCOSE SERPL-MCNC: 136 MG/DL — HIGH (ref 70–99)
GLUCOSE SERPL-MCNC: 139 MG/DL — HIGH (ref 70–99)
GLUCOSE SERPL-MCNC: 141 MG/DL — HIGH (ref 70–99)
GLUCOSE SERPL-MCNC: 200 MG/DL — HIGH (ref 70–99)
GLUCOSE SERPL-MCNC: 218 MG/DL — HIGH (ref 70–99)
GLUCOSE SERPL-MCNC: 74 MG/DL — SIGNIFICANT CHANGE UP (ref 70–99)
GLUCOSE SERPL-MCNC: 78 MG/DL — SIGNIFICANT CHANGE UP (ref 70–99)
GLUCOSE SERPL-MCNC: 78 MG/DL — SIGNIFICANT CHANGE UP (ref 70–99)
GLUCOSE SERPL-MCNC: 79 MG/DL — SIGNIFICANT CHANGE UP (ref 70–99)
GLUCOSE SERPL-MCNC: 79 MG/DL — SIGNIFICANT CHANGE UP (ref 70–99)
GLUCOSE SERPL-MCNC: 87 MG/DL — SIGNIFICANT CHANGE UP (ref 70–99)
GLUCOSE SERPL-MCNC: 89 MG/DL — SIGNIFICANT CHANGE UP (ref 70–99)
GLUCOSE SERPL-MCNC: 90 MG/DL — SIGNIFICANT CHANGE UP (ref 70–99)
GLUCOSE SERPL-MCNC: 92 MG/DL — SIGNIFICANT CHANGE UP (ref 70–99)
GLUCOSE UR QL: NEGATIVE MG/DL — SIGNIFICANT CHANGE UP
GLUCOSE UR QL: NEGATIVE MG/DL — SIGNIFICANT CHANGE UP
GRAM STN FLD: SIGNIFICANT CHANGE UP
HCO3 BLDA-SCNC: 20 MMOL/L — SIGNIFICANT CHANGE UP (ref 20–26)
HCO3 BLDA-SCNC: 21 MMOL/L — SIGNIFICANT CHANGE UP (ref 20–26)
HCO3 BLDA-SCNC: 26 MMOL/L — SIGNIFICANT CHANGE UP (ref 20–26)
HCT VFR BLD CALC: 27.3 % — LOW (ref 39–50)
HCT VFR BLD CALC: 28 % — LOW (ref 39–50)
HCT VFR BLD CALC: 28.4 % — LOW (ref 39–50)
HCT VFR BLD CALC: 29.3 % — LOW (ref 39–50)
HCT VFR BLD CALC: 29.5 % — LOW (ref 39–50)
HCT VFR BLD CALC: 30.1 % — LOW (ref 39–50)
HCT VFR BLD CALC: 32.2 % — LOW (ref 39–50)
HCT VFR BLD CALC: 32.3 % — LOW (ref 39–50)
HCT VFR BLD CALC: 32.4 % — LOW (ref 39–50)
HCT VFR BLD CALC: 32.5 % — LOW (ref 39–50)
HCT VFR BLD CALC: 32.8 % — LOW (ref 39–50)
HCT VFR BLD CALC: 33.1 % — LOW (ref 39–50)
HCT VFR BLD CALC: 33.2 % — LOW (ref 39–50)
HCT VFR BLD CALC: 35 % — LOW (ref 39–50)
HCT VFR BLD CALC: 35.1 % — LOW (ref 39–50)
HCT VFR BLD CALC: 36 % — LOW (ref 39–50)
HCT VFR BLD CALC: 36 % — LOW (ref 39–50)
HCT VFR BLD CALC: 36.6 % — LOW (ref 39–50)
HCT VFR BLD CALC: 36.7 % — LOW (ref 39–50)
HCT VFR BLD CALC: 37.7 % — LOW (ref 39–50)
HCT VFR BLD CALC: 37.8 % — LOW (ref 39–50)
HCT VFR BLD CALC: 38.4 % — LOW (ref 39–50)
HCT VFR BLD CALC: 38.7 % — LOW (ref 39–50)
HCT VFR BLD CALC: 38.9 % — LOW (ref 39–50)
HDLC SERPL-MCNC: 37 MG/DL — LOW
HEPARIN-PF4 AB RESULT: <0.6 U/ML — SIGNIFICANT CHANGE UP (ref 0–0.9)
HGB BLD-MCNC: 10.6 G/DL — LOW (ref 13–17)
HGB BLD-MCNC: 10.8 G/DL — LOW (ref 13–17)
HGB BLD-MCNC: 10.9 G/DL — LOW (ref 13–17)
HGB BLD-MCNC: 11 G/DL — LOW (ref 13–17)
HGB BLD-MCNC: 11.1 G/DL — LOW (ref 13–17)
HGB BLD-MCNC: 11.1 G/DL — LOW (ref 13–17)
HGB BLD-MCNC: 11.2 G/DL — LOW (ref 13–17)
HGB BLD-MCNC: 11.6 G/DL — LOW (ref 13–17)
HGB BLD-MCNC: 11.8 G/DL — LOW (ref 13–17)
HGB BLD-MCNC: 11.9 G/DL — LOW (ref 13–17)
HGB BLD-MCNC: 12 G/DL — LOW (ref 13–17)
HGB BLD-MCNC: 12 G/DL — LOW (ref 13–17)
HGB BLD-MCNC: 12.6 G/DL — LOW (ref 13–17)
HGB BLD-MCNC: 12.6 G/DL — LOW (ref 13–17)
HGB BLD-MCNC: 12.8 G/DL — LOW (ref 13–17)
HGB BLD-MCNC: 13 G/DL — SIGNIFICANT CHANGE UP (ref 13–17)
HGB BLD-MCNC: 8.8 G/DL — LOW (ref 13–17)
HGB BLD-MCNC: 9.2 G/DL — LOW (ref 13–17)
HGB BLD-MCNC: 9.4 G/DL — LOW (ref 13–17)
HGB BLD-MCNC: 9.6 G/DL — LOW (ref 13–17)
HGB BLD-MCNC: 9.7 G/DL — LOW (ref 13–17)
HGB BLD-MCNC: 9.9 G/DL — LOW (ref 13–17)
HOROWITZ INDEX BLDA+IHG-RTO: 50 — SIGNIFICANT CHANGE UP
HOROWITZ INDEX BLDA+IHG-RTO: SIGNIFICANT CHANGE UP
HOROWITZ INDEX BLDA+IHG-RTO: SIGNIFICANT CHANGE UP
HYPOCHROMIA BLD QL: SLIGHT — SIGNIFICANT CHANGE UP
IMM GRANULOCYTES NFR BLD AUTO: 0.3 % — SIGNIFICANT CHANGE UP (ref 0–1.5)
IMM GRANULOCYTES NFR BLD AUTO: 0.6 % — SIGNIFICANT CHANGE UP (ref 0–1.5)
IMM GRANULOCYTES NFR BLD AUTO: 0.7 % — SIGNIFICANT CHANGE UP (ref 0–1.5)
IMM GRANULOCYTES NFR BLD AUTO: 0.9 % — SIGNIFICANT CHANGE UP (ref 0–1.5)
IMM GRANULOCYTES NFR BLD AUTO: 0.9 % — SIGNIFICANT CHANGE UP (ref 0–1.5)
INR BLD: 1.17 RATIO — HIGH (ref 0.88–1.16)
INR BLD: 1.19 RATIO — HIGH (ref 0.88–1.16)
INR BLD: 1.19 RATIO — HIGH (ref 0.88–1.16)
INR BLD: 1.27 RATIO — HIGH (ref 0.88–1.16)
INR BLD: 1.57 RATIO — HIGH (ref 0.88–1.16)
KETONES UR-MCNC: ABNORMAL
KETONES UR-MCNC: ABNORMAL
LACTATE BLDV-MCNC: 2.5 MMOL/L — HIGH (ref 0.5–2)
LACTATE BLDV-MCNC: 2.9 MMOL/L — HIGH (ref 0.5–2)
LACTATE BLDV-MCNC: 2.9 MMOL/L — HIGH (ref 0.5–2)
LACTATE SERPL-SCNC: 1.7 MMOL/L — SIGNIFICANT CHANGE UP (ref 0.5–2)
LACTATE SERPL-SCNC: 2.2 MMOL/L — HIGH (ref 0.5–2)
LACTATE SERPL-SCNC: 3.5 MMOL/L — HIGH (ref 0.5–2)
LEGIONELLA AG UR QL: NEGATIVE — SIGNIFICANT CHANGE UP
LEUKOCYTE ESTERASE UR-ACNC: ABNORMAL
LEUKOCYTE ESTERASE UR-ACNC: ABNORMAL
LIPID PNL WITH DIRECT LDL SERPL: 51 MG/DL — SIGNIFICANT CHANGE UP
LYMPHOCYTES # BLD AUTO: 0.26 K/UL — LOW (ref 1–3.3)
LYMPHOCYTES # BLD AUTO: 0.49 K/UL — LOW (ref 1–3.3)
LYMPHOCYTES # BLD AUTO: 0.72 K/UL — LOW (ref 1–3.3)
LYMPHOCYTES # BLD AUTO: 1.21 K/UL — SIGNIFICANT CHANGE UP (ref 1–3.3)
LYMPHOCYTES # BLD AUTO: 1.3 K/UL — SIGNIFICANT CHANGE UP (ref 1–3.3)
LYMPHOCYTES # BLD AUTO: 1.37 K/UL — SIGNIFICANT CHANGE UP (ref 1–3.3)
LYMPHOCYTES # BLD AUTO: 1.38 K/UL — SIGNIFICANT CHANGE UP (ref 1–3.3)
LYMPHOCYTES # BLD AUTO: 1.55 K/UL — SIGNIFICANT CHANGE UP (ref 1–3.3)
LYMPHOCYTES # BLD AUTO: 1.81 K/UL — SIGNIFICANT CHANGE UP (ref 1–3.3)
LYMPHOCYTES # BLD AUTO: 13.5 % — SIGNIFICANT CHANGE UP (ref 13–44)
LYMPHOCYTES # BLD AUTO: 13.6 % — SIGNIFICANT CHANGE UP (ref 13–44)
LYMPHOCYTES # BLD AUTO: 14.2 % — SIGNIFICANT CHANGE UP (ref 13–44)
LYMPHOCYTES # BLD AUTO: 15.5 % — SIGNIFICANT CHANGE UP (ref 13–44)
LYMPHOCYTES # BLD AUTO: 15.7 % — SIGNIFICANT CHANGE UP (ref 13–44)
LYMPHOCYTES # BLD AUTO: 19.7 % — SIGNIFICANT CHANGE UP (ref 13–44)
LYMPHOCYTES # BLD AUTO: 4 % — LOW (ref 13–44)
LYMPHOCYTES # BLD AUTO: 4.5 % — LOW (ref 13–44)
LYMPHOCYTES # BLD AUTO: 9.6 % — LOW (ref 13–44)
MACROCYTES BLD QL: SLIGHT — SIGNIFICANT CHANGE UP
MAGNESIUM SERPL-MCNC: 1.5 MG/DL — LOW (ref 1.6–2.6)
MAGNESIUM SERPL-MCNC: 1.6 MG/DL — SIGNIFICANT CHANGE UP (ref 1.6–2.6)
MAGNESIUM SERPL-MCNC: 1.7 MG/DL — SIGNIFICANT CHANGE UP (ref 1.6–2.6)
MAGNESIUM SERPL-MCNC: 1.8 MG/DL — SIGNIFICANT CHANGE UP (ref 1.6–2.6)
MAGNESIUM SERPL-MCNC: 1.8 MG/DL — SIGNIFICANT CHANGE UP (ref 1.8–2.6)
MAGNESIUM SERPL-MCNC: 1.9 MG/DL — SIGNIFICANT CHANGE UP (ref 1.6–2.6)
MAGNESIUM SERPL-MCNC: 2 MG/DL — SIGNIFICANT CHANGE UP (ref 1.6–2.6)
MAGNESIUM SERPL-MCNC: 2 MG/DL — SIGNIFICANT CHANGE UP (ref 1.6–2.6)
MAGNESIUM SERPL-MCNC: 2.1 MG/DL — SIGNIFICANT CHANGE UP (ref 1.6–2.6)
MAGNESIUM SERPL-MCNC: 2.1 MG/DL — SIGNIFICANT CHANGE UP (ref 1.6–2.6)
MAGNESIUM SERPL-MCNC: 2.2 MG/DL — SIGNIFICANT CHANGE UP (ref 1.6–2.6)
MAGNESIUM SERPL-MCNC: 2.2 MG/DL — SIGNIFICANT CHANGE UP (ref 1.6–2.6)
MAGNESIUM SERPL-MCNC: 2.7 MG/DL — HIGH (ref 1.6–2.6)
MANUAL SMEAR VERIFICATION: SIGNIFICANT CHANGE UP
MCHC RBC-ENTMCNC: 31.3 PG — SIGNIFICANT CHANGE UP (ref 27–34)
MCHC RBC-ENTMCNC: 31.4 GM/DL — LOW (ref 32–36)
MCHC RBC-ENTMCNC: 31.6 GM/DL — LOW (ref 32–36)
MCHC RBC-ENTMCNC: 31.6 GM/DL — LOW (ref 32–36)
MCHC RBC-ENTMCNC: 31.7 PG — SIGNIFICANT CHANGE UP (ref 27–34)
MCHC RBC-ENTMCNC: 31.8 PG — SIGNIFICANT CHANGE UP (ref 27–34)
MCHC RBC-ENTMCNC: 31.9 PG — SIGNIFICANT CHANGE UP (ref 27–34)
MCHC RBC-ENTMCNC: 32 PG — SIGNIFICANT CHANGE UP (ref 27–34)
MCHC RBC-ENTMCNC: 32 PG — SIGNIFICANT CHANGE UP (ref 27–34)
MCHC RBC-ENTMCNC: 32.1 PG — SIGNIFICANT CHANGE UP (ref 27–34)
MCHC RBC-ENTMCNC: 32.2 GM/DL — SIGNIFICANT CHANGE UP (ref 32–36)
MCHC RBC-ENTMCNC: 32.2 GM/DL — SIGNIFICANT CHANGE UP (ref 32–36)
MCHC RBC-ENTMCNC: 32.2 PG — SIGNIFICANT CHANGE UP (ref 27–34)
MCHC RBC-ENTMCNC: 32.2 PG — SIGNIFICANT CHANGE UP (ref 27–34)
MCHC RBC-ENTMCNC: 32.3 PG — SIGNIFICANT CHANGE UP (ref 27–34)
MCHC RBC-ENTMCNC: 32.4 PG — SIGNIFICANT CHANGE UP (ref 27–34)
MCHC RBC-ENTMCNC: 32.4 PG — SIGNIFICANT CHANGE UP (ref 27–34)
MCHC RBC-ENTMCNC: 32.5 GM/DL — SIGNIFICANT CHANGE UP (ref 32–36)
MCHC RBC-ENTMCNC: 32.5 PG — SIGNIFICANT CHANGE UP (ref 27–34)
MCHC RBC-ENTMCNC: 32.5 PG — SIGNIFICANT CHANGE UP (ref 27–34)
MCHC RBC-ENTMCNC: 32.6 GM/DL — SIGNIFICANT CHANGE UP (ref 32–36)
MCHC RBC-ENTMCNC: 32.6 PG — SIGNIFICANT CHANGE UP (ref 27–34)
MCHC RBC-ENTMCNC: 32.7 GM/DL — SIGNIFICANT CHANGE UP (ref 32–36)
MCHC RBC-ENTMCNC: 32.7 GM/DL — SIGNIFICANT CHANGE UP (ref 32–36)
MCHC RBC-ENTMCNC: 32.8 GM/DL — SIGNIFICANT CHANGE UP (ref 32–36)
MCHC RBC-ENTMCNC: 32.8 GM/DL — SIGNIFICANT CHANGE UP (ref 32–36)
MCHC RBC-ENTMCNC: 32.9 GM/DL — SIGNIFICANT CHANGE UP (ref 32–36)
MCHC RBC-ENTMCNC: 32.9 PG — SIGNIFICANT CHANGE UP (ref 27–34)
MCHC RBC-ENTMCNC: 33.1 GM/DL — SIGNIFICANT CHANGE UP (ref 32–36)
MCHC RBC-ENTMCNC: 33.1 GM/DL — SIGNIFICANT CHANGE UP (ref 32–36)
MCHC RBC-ENTMCNC: 33.3 GM/DL — SIGNIFICANT CHANGE UP (ref 32–36)
MCHC RBC-ENTMCNC: 33.4 GM/DL — SIGNIFICANT CHANGE UP (ref 32–36)
MCHC RBC-ENTMCNC: 33.7 GM/DL — SIGNIFICANT CHANGE UP (ref 32–36)
MCHC RBC-ENTMCNC: 33.8 GM/DL — SIGNIFICANT CHANGE UP (ref 32–36)
MCHC RBC-ENTMCNC: 33.8 GM/DL — SIGNIFICANT CHANGE UP (ref 32–36)
MCHC RBC-ENTMCNC: 33.9 GM/DL — SIGNIFICANT CHANGE UP (ref 32–36)
MCHC RBC-ENTMCNC: 33.9 GM/DL — SIGNIFICANT CHANGE UP (ref 32–36)
MCHC RBC-ENTMCNC: 34.5 GM/DL — SIGNIFICANT CHANGE UP (ref 32–36)
MCV RBC AUTO: 100.4 FL — HIGH (ref 80–100)
MCV RBC AUTO: 100.6 FL — HIGH (ref 80–100)
MCV RBC AUTO: 94.5 FL — SIGNIFICANT CHANGE UP (ref 80–100)
MCV RBC AUTO: 95 FL — SIGNIFICANT CHANGE UP (ref 80–100)
MCV RBC AUTO: 95.1 FL — SIGNIFICANT CHANGE UP (ref 80–100)
MCV RBC AUTO: 95.7 FL — SIGNIFICANT CHANGE UP (ref 80–100)
MCV RBC AUTO: 96.1 FL — SIGNIFICANT CHANGE UP (ref 80–100)
MCV RBC AUTO: 96.2 FL — SIGNIFICANT CHANGE UP (ref 80–100)
MCV RBC AUTO: 96.6 FL — SIGNIFICANT CHANGE UP (ref 80–100)
MCV RBC AUTO: 96.8 FL — SIGNIFICANT CHANGE UP (ref 80–100)
MCV RBC AUTO: 97 FL — SIGNIFICANT CHANGE UP (ref 80–100)
MCV RBC AUTO: 97 FL — SIGNIFICANT CHANGE UP (ref 80–100)
MCV RBC AUTO: 97.2 FL — SIGNIFICANT CHANGE UP (ref 80–100)
MCV RBC AUTO: 97.3 FL — SIGNIFICANT CHANGE UP (ref 80–100)
MCV RBC AUTO: 97.3 FL — SIGNIFICANT CHANGE UP (ref 80–100)
MCV RBC AUTO: 97.6 FL — SIGNIFICANT CHANGE UP (ref 80–100)
MCV RBC AUTO: 98.7 FL — SIGNIFICANT CHANGE UP (ref 80–100)
MCV RBC AUTO: 99.2 FL — SIGNIFICANT CHANGE UP (ref 80–100)
MCV RBC AUTO: 99.3 FL — SIGNIFICANT CHANGE UP (ref 80–100)
MCV RBC AUTO: 99.7 FL — SIGNIFICANT CHANGE UP (ref 80–100)
METAMYELOCYTES # FLD: 2.6 % — HIGH (ref 0–0)
METAMYELOCYTES # FLD: 6 % — HIGH (ref 0–0)
METHOD TYPE: SIGNIFICANT CHANGE UP
MONOCYTES # BLD AUTO: 0.13 K/UL — SIGNIFICANT CHANGE UP (ref 0–0.9)
MONOCYTES # BLD AUTO: 0.22 K/UL — SIGNIFICANT CHANGE UP (ref 0–0.9)
MONOCYTES # BLD AUTO: 0.75 K/UL — SIGNIFICANT CHANGE UP (ref 0–0.9)
MONOCYTES # BLD AUTO: 0.76 K/UL — SIGNIFICANT CHANGE UP (ref 0–0.9)
MONOCYTES # BLD AUTO: 0.77 K/UL — SIGNIFICANT CHANGE UP (ref 0–0.9)
MONOCYTES # BLD AUTO: 1.02 K/UL — HIGH (ref 0–0.9)
MONOCYTES # BLD AUTO: 1.18 K/UL — HIGH (ref 0–0.9)
MONOCYTES # BLD AUTO: 1.2 K/UL — HIGH (ref 0–0.9)
MONOCYTES # BLD AUTO: 1.5 K/UL — HIGH (ref 0–0.9)
MONOCYTES NFR BLD AUTO: 10.3 % — SIGNIFICANT CHANGE UP (ref 2–14)
MONOCYTES NFR BLD AUTO: 11.5 % — SIGNIFICANT CHANGE UP (ref 2–14)
MONOCYTES NFR BLD AUTO: 12.5 % — SIGNIFICANT CHANGE UP (ref 2–14)
MONOCYTES NFR BLD AUTO: 2 % — SIGNIFICANT CHANGE UP (ref 2–14)
MONOCYTES NFR BLD AUTO: 4.4 % — SIGNIFICANT CHANGE UP (ref 2–14)
MONOCYTES NFR BLD AUTO: 7.8 % — SIGNIFICANT CHANGE UP (ref 2–14)
MONOCYTES NFR BLD AUTO: 8.7 % — SIGNIFICANT CHANGE UP (ref 2–14)
MONOCYTES NFR BLD AUTO: 9.3 % — SIGNIFICANT CHANGE UP (ref 2–14)
MONOCYTES NFR BLD AUTO: 9.4 % — SIGNIFICANT CHANGE UP (ref 2–14)
MRSA PCR RESULT.: SIGNIFICANT CHANGE UP
MYELOCYTES NFR BLD: 0.9 % — HIGH (ref 0–0)
MYELOCYTES NFR BLD: 6 % — HIGH (ref 0–0)
NEUTROPHILS # BLD AUTO: 13.72 K/UL — HIGH (ref 1.8–7.4)
NEUTROPHILS # BLD AUTO: 3.87 K/UL — SIGNIFICANT CHANGE UP (ref 1.8–7.4)
NEUTROPHILS # BLD AUTO: 4.12 K/UL — SIGNIFICANT CHANGE UP (ref 1.8–7.4)
NEUTROPHILS # BLD AUTO: 4.83 K/UL — SIGNIFICANT CHANGE UP (ref 1.8–7.4)
NEUTROPHILS # BLD AUTO: 6.12 K/UL — SIGNIFICANT CHANGE UP (ref 1.8–7.4)
NEUTROPHILS # BLD AUTO: 6.51 K/UL — SIGNIFICANT CHANGE UP (ref 1.8–7.4)
NEUTROPHILS # BLD AUTO: 7.11 K/UL — SIGNIFICANT CHANGE UP (ref 1.8–7.4)
NEUTROPHILS # BLD AUTO: 8.45 K/UL — HIGH (ref 1.8–7.4)
NEUTROPHILS # BLD AUTO: 9.46 K/UL — HIGH (ref 1.8–7.4)
NEUTROPHILS NFR BLD AUTO: 43.9 % — SIGNIFICANT CHANGE UP (ref 43–77)
NEUTROPHILS NFR BLD AUTO: 51 % — SIGNIFICANT CHANGE UP (ref 43–77)
NEUTROPHILS NFR BLD AUTO: 62.9 % — SIGNIFICANT CHANGE UP (ref 43–77)
NEUTROPHILS NFR BLD AUTO: 69.3 % — SIGNIFICANT CHANGE UP (ref 43–77)
NEUTROPHILS NFR BLD AUTO: 73.9 % — SIGNIFICANT CHANGE UP (ref 43–77)
NEUTROPHILS NFR BLD AUTO: 74 % — SIGNIFICANT CHANGE UP (ref 43–77)
NEUTROPHILS NFR BLD AUTO: 74.1 % — SIGNIFICANT CHANGE UP (ref 43–77)
NEUTROPHILS NFR BLD AUTO: 74.2 % — SIGNIFICANT CHANGE UP (ref 43–77)
NEUTROPHILS NFR BLD AUTO: 85.5 % — HIGH (ref 43–77)
NEUTS BAND # BLD: 24 % — HIGH (ref 0–8)
NEUTS BAND # BLD: 36.8 % — HIGH (ref 0–8)
NITRITE UR-MCNC: NEGATIVE — SIGNIFICANT CHANGE UP
NITRITE UR-MCNC: NEGATIVE — SIGNIFICANT CHANGE UP
NRBC # BLD: 0 /100 — SIGNIFICANT CHANGE UP (ref 0–0)
NT-PROBNP SERPL-SCNC: 3679 PG/ML — HIGH (ref 0–300)
NT-PROBNP SERPL-SCNC: 4576 PG/ML — HIGH (ref 0–300)
ORGANISM # SPEC MICROSCOPIC CNT: SIGNIFICANT CHANGE UP
ORGANISM # SPEC MICROSCOPIC CNT: SIGNIFICANT CHANGE UP
OVALOCYTES BLD QL SMEAR: SLIGHT — SIGNIFICANT CHANGE UP
PCO2 BLDA: 41 MMHG — SIGNIFICANT CHANGE UP (ref 35–45)
PCO2 BLDA: 44 MMHG — SIGNIFICANT CHANGE UP (ref 35–45)
PCO2 BLDA: 62 MMHG — HIGH (ref 35–45)
PF4 HEPARIN CMPLX AB SER-ACNC: NEGATIVE — SIGNIFICANT CHANGE UP
PH BLDA: 7.2 — CRITICAL LOW (ref 7.35–7.45)
PH BLDA: 7.32 — LOW (ref 7.35–7.45)
PH BLDA: 7.41 — SIGNIFICANT CHANGE UP (ref 7.35–7.45)
PH UR: 5 — SIGNIFICANT CHANGE UP (ref 5–8)
PH UR: 6 — SIGNIFICANT CHANGE UP (ref 5–8)
PHOSPHATE SERPL-MCNC: 2.3 MG/DL — LOW (ref 2.4–4.7)
PHOSPHATE SERPL-MCNC: 2.6 MG/DL — SIGNIFICANT CHANGE UP (ref 2.4–4.7)
PHOSPHATE SERPL-MCNC: 2.6 MG/DL — SIGNIFICANT CHANGE UP (ref 2.4–4.7)
PHOSPHATE SERPL-MCNC: 2.7 MG/DL — SIGNIFICANT CHANGE UP (ref 2.4–4.7)
PHOSPHATE SERPL-MCNC: 2.8 MG/DL — SIGNIFICANT CHANGE UP (ref 2.4–4.7)
PHOSPHATE SERPL-MCNC: 3 MG/DL — SIGNIFICANT CHANGE UP (ref 2.4–4.7)
PHOSPHATE SERPL-MCNC: 3 MG/DL — SIGNIFICANT CHANGE UP (ref 2.4–4.7)
PHOSPHATE SERPL-MCNC: 3.2 MG/DL — SIGNIFICANT CHANGE UP (ref 2.4–4.7)
PHOSPHATE SERPL-MCNC: 3.2 MG/DL — SIGNIFICANT CHANGE UP (ref 2.4–4.7)
PHOSPHATE SERPL-MCNC: 3.3 MG/DL — SIGNIFICANT CHANGE UP (ref 2.4–4.7)
PHOSPHATE SERPL-MCNC: 3.4 MG/DL — SIGNIFICANT CHANGE UP (ref 2.4–4.7)
PHOSPHATE SERPL-MCNC: 3.5 MG/DL — SIGNIFICANT CHANGE UP (ref 2.4–4.7)
PHOSPHATE SERPL-MCNC: 3.7 MG/DL — SIGNIFICANT CHANGE UP (ref 2.4–4.7)
PHOSPHATE SERPL-MCNC: 4.1 MG/DL — SIGNIFICANT CHANGE UP (ref 2.4–4.7)
PHOSPHATE SERPL-MCNC: 4.2 MG/DL — SIGNIFICANT CHANGE UP (ref 2.4–4.7)
PHOSPHATE SERPL-MCNC: 4.5 MG/DL — SIGNIFICANT CHANGE UP (ref 2.4–4.7)
PHOSPHATE SERPL-MCNC: 5 MG/DL — HIGH (ref 2.4–4.7)
PHOSPHATE SERPL-MCNC: 8 MG/DL — HIGH (ref 2.4–4.7)
PLAT MORPH BLD: NORMAL — SIGNIFICANT CHANGE UP
PLAT MORPH BLD: NORMAL — SIGNIFICANT CHANGE UP
PLATELET # BLD AUTO: 117 K/UL — LOW (ref 150–400)
PLATELET # BLD AUTO: 121 K/UL — LOW (ref 150–400)
PLATELET # BLD AUTO: 141 K/UL — LOW (ref 150–400)
PLATELET # BLD AUTO: 141 K/UL — LOW (ref 150–400)
PLATELET # BLD AUTO: 146 K/UL — LOW (ref 150–400)
PLATELET # BLD AUTO: 147 K/UL — LOW (ref 150–400)
PLATELET # BLD AUTO: 150 K/UL — SIGNIFICANT CHANGE UP (ref 150–400)
PLATELET # BLD AUTO: 156 K/UL — SIGNIFICANT CHANGE UP (ref 150–400)
PLATELET # BLD AUTO: 159 K/UL — SIGNIFICANT CHANGE UP (ref 150–400)
PLATELET # BLD AUTO: 166 K/UL — SIGNIFICANT CHANGE UP (ref 150–400)
PLATELET # BLD AUTO: 177 K/UL — SIGNIFICANT CHANGE UP (ref 150–400)
PLATELET # BLD AUTO: 181 K/UL — SIGNIFICANT CHANGE UP (ref 150–400)
PLATELET # BLD AUTO: 182 K/UL — SIGNIFICANT CHANGE UP (ref 150–400)
PLATELET # BLD AUTO: 194 K/UL — SIGNIFICANT CHANGE UP (ref 150–400)
PLATELET # BLD AUTO: 208 K/UL — SIGNIFICANT CHANGE UP (ref 150–400)
PLATELET # BLD AUTO: 219 K/UL — SIGNIFICANT CHANGE UP (ref 150–400)
PLATELET # BLD AUTO: 220 K/UL — SIGNIFICANT CHANGE UP (ref 150–400)
PLATELET # BLD AUTO: 233 K/UL — SIGNIFICANT CHANGE UP (ref 150–400)
PLATELET # BLD AUTO: 73 K/UL — LOW (ref 150–400)
PLATELET # BLD AUTO: 76 K/UL — LOW (ref 150–400)
PLATELET # BLD AUTO: 87 K/UL — LOW (ref 150–400)
PLATELET # BLD AUTO: 88 K/UL — LOW (ref 150–400)
PLATELET # BLD AUTO: 89 K/UL — LOW (ref 150–400)
PLATELET # BLD AUTO: 97 K/UL — LOW (ref 150–400)
PO2 BLDA: 189 MMHG — HIGH (ref 83–108)
PO2 BLDA: 64 MMHG — LOW (ref 83–108)
PO2 BLDA: 93 MMHG — SIGNIFICANT CHANGE UP (ref 83–108)
POIKILOCYTOSIS BLD QL AUTO: SLIGHT — SIGNIFICANT CHANGE UP
POIKILOCYTOSIS BLD QL AUTO: SLIGHT — SIGNIFICANT CHANGE UP
POLYCHROMASIA BLD QL SMEAR: SLIGHT — SIGNIFICANT CHANGE UP
POTASSIUM SERPL-MCNC: 3.2 MMOL/L — LOW (ref 3.5–5.3)
POTASSIUM SERPL-MCNC: 3.3 MMOL/L — LOW (ref 3.5–5.3)
POTASSIUM SERPL-MCNC: 3.5 MMOL/L — SIGNIFICANT CHANGE UP (ref 3.5–5.3)
POTASSIUM SERPL-MCNC: 3.8 MMOL/L — SIGNIFICANT CHANGE UP (ref 3.5–5.3)
POTASSIUM SERPL-MCNC: 3.9 MMOL/L — SIGNIFICANT CHANGE UP (ref 3.5–5.3)
POTASSIUM SERPL-MCNC: 3.9 MMOL/L — SIGNIFICANT CHANGE UP (ref 3.5–5.3)
POTASSIUM SERPL-MCNC: 4.2 MMOL/L — SIGNIFICANT CHANGE UP (ref 3.5–5.3)
POTASSIUM SERPL-MCNC: 4.2 MMOL/L — SIGNIFICANT CHANGE UP (ref 3.5–5.3)
POTASSIUM SERPL-MCNC: 4.3 MMOL/L — SIGNIFICANT CHANGE UP (ref 3.5–5.3)
POTASSIUM SERPL-MCNC: 4.3 MMOL/L — SIGNIFICANT CHANGE UP (ref 3.5–5.3)
POTASSIUM SERPL-MCNC: 4.4 MMOL/L — SIGNIFICANT CHANGE UP (ref 3.5–5.3)
POTASSIUM SERPL-MCNC: 4.4 MMOL/L — SIGNIFICANT CHANGE UP (ref 3.5–5.3)
POTASSIUM SERPL-MCNC: 4.5 MMOL/L — SIGNIFICANT CHANGE UP (ref 3.5–5.3)
POTASSIUM SERPL-MCNC: 4.7 MMOL/L — SIGNIFICANT CHANGE UP (ref 3.5–5.3)
POTASSIUM SERPL-MCNC: 4.8 MMOL/L — SIGNIFICANT CHANGE UP (ref 3.5–5.3)
POTASSIUM SERPL-MCNC: 4.8 MMOL/L — SIGNIFICANT CHANGE UP (ref 3.5–5.3)
POTASSIUM SERPL-MCNC: 4.9 MMOL/L — SIGNIFICANT CHANGE UP (ref 3.5–5.3)
POTASSIUM SERPL-MCNC: 5 MMOL/L — SIGNIFICANT CHANGE UP (ref 3.5–5.3)
POTASSIUM SERPL-MCNC: 5.1 MMOL/L — SIGNIFICANT CHANGE UP (ref 3.5–5.3)
POTASSIUM SERPL-MCNC: 5.5 MMOL/L — HIGH (ref 3.5–5.3)
POTASSIUM SERPL-SCNC: 3.2 MMOL/L — LOW (ref 3.5–5.3)
POTASSIUM SERPL-SCNC: 3.3 MMOL/L — LOW (ref 3.5–5.3)
POTASSIUM SERPL-SCNC: 3.5 MMOL/L — SIGNIFICANT CHANGE UP (ref 3.5–5.3)
POTASSIUM SERPL-SCNC: 3.8 MMOL/L — SIGNIFICANT CHANGE UP (ref 3.5–5.3)
POTASSIUM SERPL-SCNC: 3.9 MMOL/L — SIGNIFICANT CHANGE UP (ref 3.5–5.3)
POTASSIUM SERPL-SCNC: 3.9 MMOL/L — SIGNIFICANT CHANGE UP (ref 3.5–5.3)
POTASSIUM SERPL-SCNC: 4.2 MMOL/L — SIGNIFICANT CHANGE UP (ref 3.5–5.3)
POTASSIUM SERPL-SCNC: 4.2 MMOL/L — SIGNIFICANT CHANGE UP (ref 3.5–5.3)
POTASSIUM SERPL-SCNC: 4.3 MMOL/L — SIGNIFICANT CHANGE UP (ref 3.5–5.3)
POTASSIUM SERPL-SCNC: 4.3 MMOL/L — SIGNIFICANT CHANGE UP (ref 3.5–5.3)
POTASSIUM SERPL-SCNC: 4.4 MMOL/L — SIGNIFICANT CHANGE UP (ref 3.5–5.3)
POTASSIUM SERPL-SCNC: 4.4 MMOL/L — SIGNIFICANT CHANGE UP (ref 3.5–5.3)
POTASSIUM SERPL-SCNC: 4.5 MMOL/L — SIGNIFICANT CHANGE UP (ref 3.5–5.3)
POTASSIUM SERPL-SCNC: 4.7 MMOL/L — SIGNIFICANT CHANGE UP (ref 3.5–5.3)
POTASSIUM SERPL-SCNC: 4.8 MMOL/L — SIGNIFICANT CHANGE UP (ref 3.5–5.3)
POTASSIUM SERPL-SCNC: 4.8 MMOL/L — SIGNIFICANT CHANGE UP (ref 3.5–5.3)
POTASSIUM SERPL-SCNC: 4.9 MMOL/L — SIGNIFICANT CHANGE UP (ref 3.5–5.3)
POTASSIUM SERPL-SCNC: 5 MMOL/L — SIGNIFICANT CHANGE UP (ref 3.5–5.3)
POTASSIUM SERPL-SCNC: 5.1 MMOL/L — SIGNIFICANT CHANGE UP (ref 3.5–5.3)
POTASSIUM SERPL-SCNC: 5.5 MMOL/L — HIGH (ref 3.5–5.3)
PROT SERPL-MCNC: 5.2 G/DL — LOW (ref 6.6–8.7)
PROT SERPL-MCNC: 5.3 G/DL — LOW (ref 6.6–8.7)
PROT SERPL-MCNC: 5.5 G/DL — LOW (ref 6.6–8.7)
PROT SERPL-MCNC: 5.6 G/DL — LOW (ref 6.6–8.7)
PROT SERPL-MCNC: 5.8 G/DL — LOW (ref 6.6–8.7)
PROT SERPL-MCNC: 5.8 G/DL — LOW (ref 6.6–8.7)
PROT SERPL-MCNC: 5.9 G/DL — LOW (ref 6.6–8.7)
PROT SERPL-MCNC: 6 G/DL — LOW (ref 6.6–8.7)
PROT SERPL-MCNC: 6.5 G/DL — LOW (ref 6.6–8.7)
PROT SERPL-MCNC: 6.6 G/DL — SIGNIFICANT CHANGE UP (ref 6.6–8.7)
PROT UR-MCNC: 30 MG/DL
PROT UR-MCNC: 30 MG/DL
PROTHROM AB SERPL-ACNC: 13.3 SEC — HIGH (ref 10–12.9)
PROTHROM AB SERPL-ACNC: 13.5 SEC — HIGH (ref 10–12.9)
PROTHROM AB SERPL-ACNC: 13.7 SEC — HIGH (ref 10.6–13.6)
PROTHROM AB SERPL-ACNC: 14.6 SEC — HIGH (ref 10.6–13.6)
PROTHROM AB SERPL-ACNC: 17.8 SEC — HIGH (ref 10.6–13.6)
RAPID RVP RESULT: SIGNIFICANT CHANGE UP
RBC # BLD: 2.72 M/UL — LOW (ref 4.2–5.8)
RBC # BLD: 2.87 M/UL — LOW (ref 4.2–5.8)
RBC # BLD: 2.94 M/UL — LOW (ref 4.2–5.8)
RBC # BLD: 2.95 M/UL — LOW (ref 4.2–5.8)
RBC # BLD: 2.99 M/UL — LOW (ref 4.2–5.8)
RBC # BLD: 3.05 M/UL — LOW (ref 4.2–5.8)
RBC # BLD: 3.31 M/UL — LOW (ref 4.2–5.8)
RBC # BLD: 3.32 M/UL — LOW (ref 4.2–5.8)
RBC # BLD: 3.35 M/UL — LOW (ref 4.2–5.8)
RBC # BLD: 3.41 M/UL — LOW (ref 4.2–5.8)
RBC # BLD: 3.44 M/UL — LOW (ref 4.2–5.8)
RBC # BLD: 3.47 M/UL — LOW (ref 4.2–5.8)
RBC # BLD: 3.48 M/UL — LOW (ref 4.2–5.8)
RBC # BLD: 3.49 M/UL — LOW (ref 4.2–5.8)
RBC # BLD: 3.51 M/UL — LOW (ref 4.2–5.8)
RBC # BLD: 3.71 M/UL — LOW (ref 4.2–5.8)
RBC # BLD: 3.71 M/UL — LOW (ref 4.2–5.8)
RBC # BLD: 3.77 M/UL — LOW (ref 4.2–5.8)
RBC # BLD: 3.78 M/UL — LOW (ref 4.2–5.8)
RBC # BLD: 3.8 M/UL — LOW (ref 4.2–5.8)
RBC # BLD: 3.89 M/UL — LOW (ref 4.2–5.8)
RBC # BLD: 3.92 M/UL — LOW (ref 4.2–5.8)
RBC # BLD: 4 M/UL — LOW (ref 4.2–5.8)
RBC # BLD: 4.04 M/UL — LOW (ref 4.2–5.8)
RBC # FLD: 12.5 % — SIGNIFICANT CHANGE UP (ref 10.3–14.5)
RBC # FLD: 12.5 % — SIGNIFICANT CHANGE UP (ref 10.3–14.5)
RBC # FLD: 12.6 % — SIGNIFICANT CHANGE UP (ref 10.3–14.5)
RBC # FLD: 12.7 % — SIGNIFICANT CHANGE UP (ref 10.3–14.5)
RBC # FLD: 12.8 % — SIGNIFICANT CHANGE UP (ref 10.3–14.5)
RBC # FLD: 12.8 % — SIGNIFICANT CHANGE UP (ref 10.3–14.5)
RBC # FLD: 12.9 % — SIGNIFICANT CHANGE UP (ref 10.3–14.5)
RBC # FLD: 12.9 % — SIGNIFICANT CHANGE UP (ref 10.3–14.5)
RBC # FLD: 13 % — SIGNIFICANT CHANGE UP (ref 10.3–14.5)
RBC # FLD: 13.1 % — SIGNIFICANT CHANGE UP (ref 10.3–14.5)
RBC # FLD: 13.2 % — SIGNIFICANT CHANGE UP (ref 10.3–14.5)
RBC # FLD: 13.5 % — SIGNIFICANT CHANGE UP (ref 10.3–14.5)
RBC # FLD: 13.7 % — SIGNIFICANT CHANGE UP (ref 10.3–14.5)
RBC # FLD: 13.7 % — SIGNIFICANT CHANGE UP (ref 10.3–14.5)
RBC BLD AUTO: ABNORMAL
RBC BLD AUTO: ABNORMAL
RBC CASTS # UR COMP ASSIST: ABNORMAL /HPF (ref 0–4)
RBC CASTS # UR COMP ASSIST: ABNORMAL /HPF (ref 0–4)
S AUREUS DNA NOSE QL NAA+PROBE: DETECTED
SAO2 % BLDA: 100 % — HIGH (ref 95–99)
SAO2 % BLDA: 93 % — LOW (ref 95–99)
SAO2 % BLDA: 95 % — SIGNIFICANT CHANGE UP (ref 95–99)
SARS-COV-2 IGG SERPL QL IA: NEGATIVE — SIGNIFICANT CHANGE UP
SARS-COV-2 IGM SERPL IA-ACNC: 0.03 INDEX — SIGNIFICANT CHANGE UP
SARS-COV-2 IGM SERPL IA-ACNC: <0.1 INDEX — SIGNIFICANT CHANGE UP
SARS-COV-2 IGM SERPL IA-ACNC: <0.1 INDEX — SIGNIFICANT CHANGE UP
SARS-COV-2 RNA SPEC QL NAA+PROBE: SIGNIFICANT CHANGE UP
SODIUM SERPL-SCNC: 130 MMOL/L — LOW (ref 135–145)
SODIUM SERPL-SCNC: 132 MMOL/L — LOW (ref 135–145)
SODIUM SERPL-SCNC: 133 MMOL/L — LOW (ref 135–145)
SODIUM SERPL-SCNC: 133 MMOL/L — LOW (ref 135–145)
SODIUM SERPL-SCNC: 134 MMOL/L — LOW (ref 135–145)
SODIUM SERPL-SCNC: 135 MMOL/L — SIGNIFICANT CHANGE UP (ref 135–145)
SODIUM SERPL-SCNC: 136 MMOL/L — SIGNIFICANT CHANGE UP (ref 135–145)
SODIUM SERPL-SCNC: 137 MMOL/L — SIGNIFICANT CHANGE UP (ref 135–145)
SODIUM SERPL-SCNC: 138 MMOL/L — SIGNIFICANT CHANGE UP (ref 135–145)
SODIUM SERPL-SCNC: 139 MMOL/L — SIGNIFICANT CHANGE UP (ref 135–145)
SODIUM SERPL-SCNC: 141 MMOL/L — SIGNIFICANT CHANGE UP (ref 135–145)
SODIUM SERPL-SCNC: 145 MMOL/L — SIGNIFICANT CHANGE UP (ref 135–145)
SP GR SPEC: 1.02 — SIGNIFICANT CHANGE UP (ref 1.01–1.02)
SP GR SPEC: 1.02 — SIGNIFICANT CHANGE UP (ref 1.01–1.02)
SPECIMEN SOURCE: SIGNIFICANT CHANGE UP
TOTAL CHOLESTEROL/HDL RATIO MEASUREMENT: 3 RATIO — LOW (ref 3.4–9.6)
TRIGL SERPL-MCNC: 64 MG/DL — SIGNIFICANT CHANGE UP (ref 10–200)
TROPONIN T SERPL-MCNC: 0.01 NG/ML — SIGNIFICANT CHANGE UP (ref 0–0.06)
TROPONIN T SERPL-MCNC: 0.02 NG/ML — SIGNIFICANT CHANGE UP (ref 0–0.06)
TROPONIN T SERPL-MCNC: 0.19 NG/ML — HIGH (ref 0–0.06)
TROPONIN T SERPL-MCNC: 0.52 NG/ML — HIGH (ref 0–0.06)
TROPONIN T SERPL-MCNC: 0.57 NG/ML — HIGH (ref 0–0.06)
TROPONIN T SERPL-MCNC: 0.68 NG/ML — HIGH (ref 0–0.06)
TROPONIN T SERPL-MCNC: 1.08 NG/ML — HIGH (ref 0–0.06)
TROPONIN T SERPL-MCNC: 1.11 NG/ML — HIGH (ref 0–0.06)
TROPONIN T SERPL-MCNC: <0.01 NG/ML — SIGNIFICANT CHANGE UP (ref 0–0.06)
TROPONIN T SERPL-MCNC: <0.01 NG/ML — SIGNIFICANT CHANGE UP (ref 0–0.06)
TSH SERPL-MCNC: 2.8 UIU/ML — SIGNIFICANT CHANGE UP (ref 0.27–4.2)
TSH SERPL-MCNC: 3.09 UIU/ML — SIGNIFICANT CHANGE UP (ref 0.27–4.2)
UROBILINOGEN FLD QL: 8 MG/DL
UROBILINOGEN FLD QL: NEGATIVE MG/DL — SIGNIFICANT CHANGE UP
VARIANT LYMPHS # BLD: 0.9 % — SIGNIFICANT CHANGE UP (ref 0–6)
VARIANT LYMPHS # BLD: 6 % — SIGNIFICANT CHANGE UP (ref 0–6)
WBC # BLD: 10.28 K/UL — SIGNIFICANT CHANGE UP (ref 3.8–10.5)
WBC # BLD: 11.42 K/UL — HIGH (ref 3.8–10.5)
WBC # BLD: 11.99 K/UL — HIGH (ref 3.8–10.5)
WBC # BLD: 12.54 K/UL — HIGH (ref 3.8–10.5)
WBC # BLD: 12.76 K/UL — HIGH (ref 3.8–10.5)
WBC # BLD: 12.8 K/UL — HIGH (ref 3.8–10.5)
WBC # BLD: 16.05 K/UL — HIGH (ref 3.8–10.5)
WBC # BLD: 16.46 K/UL — HIGH (ref 3.8–10.5)
WBC # BLD: 2.7 K/UL — LOW (ref 3.8–10.5)
WBC # BLD: 5.1 K/UL — SIGNIFICANT CHANGE UP (ref 3.8–10.5)
WBC # BLD: 6.15 K/UL — SIGNIFICANT CHANGE UP (ref 3.8–10.5)
WBC # BLD: 6.44 K/UL — SIGNIFICANT CHANGE UP (ref 3.8–10.5)
WBC # BLD: 6.84 K/UL — SIGNIFICANT CHANGE UP (ref 3.8–10.5)
WBC # BLD: 6.84 K/UL — SIGNIFICANT CHANGE UP (ref 3.8–10.5)
WBC # BLD: 6.86 K/UL — SIGNIFICANT CHANGE UP (ref 3.8–10.5)
WBC # BLD: 7.07 K/UL — SIGNIFICANT CHANGE UP (ref 3.8–10.5)
WBC # BLD: 7.3 K/UL — SIGNIFICANT CHANGE UP (ref 3.8–10.5)
WBC # BLD: 7.53 K/UL — SIGNIFICANT CHANGE UP (ref 3.8–10.5)
WBC # BLD: 7.58 K/UL — SIGNIFICANT CHANGE UP (ref 3.8–10.5)
WBC # BLD: 8.77 K/UL — SIGNIFICANT CHANGE UP (ref 3.8–10.5)
WBC # BLD: 8.84 K/UL — SIGNIFICANT CHANGE UP (ref 3.8–10.5)
WBC # BLD: 9.22 K/UL — SIGNIFICANT CHANGE UP (ref 3.8–10.5)
WBC # BLD: 9.63 K/UL — SIGNIFICANT CHANGE UP (ref 3.8–10.5)
WBC # BLD: 9.86 K/UL — SIGNIFICANT CHANGE UP (ref 3.8–10.5)
WBC # FLD AUTO: 10.28 K/UL — SIGNIFICANT CHANGE UP (ref 3.8–10.5)
WBC # FLD AUTO: 11.42 K/UL — HIGH (ref 3.8–10.5)
WBC # FLD AUTO: 11.99 K/UL — HIGH (ref 3.8–10.5)
WBC # FLD AUTO: 12.54 K/UL — HIGH (ref 3.8–10.5)
WBC # FLD AUTO: 12.76 K/UL — HIGH (ref 3.8–10.5)
WBC # FLD AUTO: 12.8 K/UL — HIGH (ref 3.8–10.5)
WBC # FLD AUTO: 16.05 K/UL — HIGH (ref 3.8–10.5)
WBC # FLD AUTO: 16.46 K/UL — HIGH (ref 3.8–10.5)
WBC # FLD AUTO: 2.7 K/UL — LOW (ref 3.8–10.5)
WBC # FLD AUTO: 5.1 K/UL — SIGNIFICANT CHANGE UP (ref 3.8–10.5)
WBC # FLD AUTO: 6.15 K/UL — SIGNIFICANT CHANGE UP (ref 3.8–10.5)
WBC # FLD AUTO: 6.44 K/UL — SIGNIFICANT CHANGE UP (ref 3.8–10.5)
WBC # FLD AUTO: 6.84 K/UL — SIGNIFICANT CHANGE UP (ref 3.8–10.5)
WBC # FLD AUTO: 6.84 K/UL — SIGNIFICANT CHANGE UP (ref 3.8–10.5)
WBC # FLD AUTO: 6.86 K/UL — SIGNIFICANT CHANGE UP (ref 3.8–10.5)
WBC # FLD AUTO: 7.07 K/UL — SIGNIFICANT CHANGE UP (ref 3.8–10.5)
WBC # FLD AUTO: 7.3 K/UL — SIGNIFICANT CHANGE UP (ref 3.8–10.5)
WBC # FLD AUTO: 7.53 K/UL — SIGNIFICANT CHANGE UP (ref 3.8–10.5)
WBC # FLD AUTO: 7.58 K/UL — SIGNIFICANT CHANGE UP (ref 3.8–10.5)
WBC # FLD AUTO: 8.77 K/UL — SIGNIFICANT CHANGE UP (ref 3.8–10.5)
WBC # FLD AUTO: 8.84 K/UL — SIGNIFICANT CHANGE UP (ref 3.8–10.5)
WBC # FLD AUTO: 9.22 K/UL — SIGNIFICANT CHANGE UP (ref 3.8–10.5)
WBC # FLD AUTO: 9.63 K/UL — SIGNIFICANT CHANGE UP (ref 3.8–10.5)
WBC # FLD AUTO: 9.86 K/UL — SIGNIFICANT CHANGE UP (ref 3.8–10.5)
WBC UR QL: SIGNIFICANT CHANGE UP
WBC UR QL: SIGNIFICANT CHANGE UP

## 2020-01-01 PROCEDURE — 71045 X-RAY EXAM CHEST 1 VIEW: CPT | Mod: 26

## 2020-01-01 PROCEDURE — 87449 NOS EACH ORGANISM AG IA: CPT

## 2020-01-01 PROCEDURE — C1889: CPT

## 2020-01-01 PROCEDURE — C1882: CPT

## 2020-01-01 PROCEDURE — 94667 MNPJ CHEST WALL 1ST: CPT

## 2020-01-01 PROCEDURE — 70450 CT HEAD/BRAIN W/O DYE: CPT | Mod: 26

## 2020-01-01 PROCEDURE — 84100 ASSAY OF PHOSPHORUS: CPT

## 2020-01-01 PROCEDURE — 95720 EEG PHY/QHP EA INCR W/VEEG: CPT

## 2020-01-01 PROCEDURE — 93458 L HRT ARTERY/VENTRICLE ANGIO: CPT | Mod: XU

## 2020-01-01 PROCEDURE — 71045 X-RAY EXAM CHEST 1 VIEW: CPT

## 2020-01-01 PROCEDURE — 93306 TTE W/DOPPLER COMPLETE: CPT

## 2020-01-01 PROCEDURE — 93662 INTRACARDIAC ECG (ICE): CPT

## 2020-01-01 PROCEDURE — 86923 COMPATIBILITY TEST ELECTRIC: CPT

## 2020-01-01 PROCEDURE — C1900: CPT

## 2020-01-01 PROCEDURE — 93010 ELECTROCARDIOGRAM REPORT: CPT

## 2020-01-01 PROCEDURE — 99233 SBSQ HOSP IP/OBS HIGH 50: CPT

## 2020-01-01 PROCEDURE — 99291 CRITICAL CARE FIRST HOUR: CPT

## 2020-01-01 PROCEDURE — 83735 ASSAY OF MAGNESIUM: CPT

## 2020-01-01 PROCEDURE — C1892: CPT

## 2020-01-01 PROCEDURE — 36415 COLL VENOUS BLD VENIPUNCTURE: CPT

## 2020-01-01 PROCEDURE — 93005 ELECTROCARDIOGRAM TRACING: CPT

## 2020-01-01 PROCEDURE — 81001 URINALYSIS AUTO W/SCOPE: CPT

## 2020-01-01 PROCEDURE — 99153 MOD SED SAME PHYS/QHP EA: CPT

## 2020-01-01 PROCEDURE — 96374 THER/PROPH/DIAG INJ IV PUSH: CPT

## 2020-01-01 PROCEDURE — 96375 TX/PRO/DX INJ NEW DRUG ADDON: CPT

## 2020-01-01 PROCEDURE — 99152 MOD SED SAME PHYS/QHP 5/>YRS: CPT

## 2020-01-01 PROCEDURE — 85027 COMPLETE CBC AUTOMATED: CPT

## 2020-01-01 PROCEDURE — 92978 ENDOLUMINL IVUS OCT C 1ST: CPT | Mod: LD

## 2020-01-01 PROCEDURE — 92610 EVALUATE SWALLOWING FUNCTION: CPT

## 2020-01-01 PROCEDURE — 87640 STAPH A DNA AMP PROBE: CPT

## 2020-01-01 PROCEDURE — U0003: CPT

## 2020-01-01 PROCEDURE — 36600 WITHDRAWAL OF ARTERIAL BLOOD: CPT

## 2020-01-01 PROCEDURE — 84295 ASSAY OF SERUM SODIUM: CPT

## 2020-01-01 PROCEDURE — 99232 SBSQ HOSP IP/OBS MODERATE 35: CPT | Mod: 24,25

## 2020-01-01 PROCEDURE — 12345: CPT | Mod: NC,GC

## 2020-01-01 PROCEDURE — C1887: CPT

## 2020-01-01 PROCEDURE — 93655 ICAR CATH ABLTJ DSCRT ARRHYT: CPT

## 2020-01-01 PROCEDURE — 99222 1ST HOSP IP/OBS MODERATE 55: CPT

## 2020-01-01 PROCEDURE — 97163 PT EVAL HIGH COMPLEX 45 MIN: CPT

## 2020-01-01 PROCEDURE — 87641 MR-STAPH DNA AMP PROBE: CPT

## 2020-01-01 PROCEDURE — 82803 BLOOD GASES ANY COMBINATION: CPT

## 2020-01-01 PROCEDURE — 94003 VENT MGMT INPAT SUBQ DAY: CPT

## 2020-01-01 PROCEDURE — C1730: CPT

## 2020-01-01 PROCEDURE — C1725: CPT

## 2020-01-01 PROCEDURE — 99239 HOSP IP/OBS DSCHRG MGMT >30: CPT

## 2020-01-01 PROCEDURE — 94760 N-INVAS EAR/PLS OXIMETRY 1: CPT

## 2020-01-01 PROCEDURE — 99291 CRITICAL CARE FIRST HOUR: CPT | Mod: 25

## 2020-01-01 PROCEDURE — C9606: CPT | Mod: LD

## 2020-01-01 PROCEDURE — 94002 VENT MGMT INPAT INIT DAY: CPT

## 2020-01-01 PROCEDURE — 85730 THROMBOPLASTIN TIME PARTIAL: CPT

## 2020-01-01 PROCEDURE — 84443 ASSAY THYROID STIM HORMONE: CPT

## 2020-01-01 PROCEDURE — 82330 ASSAY OF CALCIUM: CPT

## 2020-01-01 PROCEDURE — 84484 ASSAY OF TROPONIN QUANT: CPT

## 2020-01-01 PROCEDURE — 82435 ASSAY OF BLOOD CHLORIDE: CPT

## 2020-01-01 PROCEDURE — 87635 SARS-COV-2 COVID-19 AMP PRB: CPT

## 2020-01-01 PROCEDURE — 93010 ELECTROCARDIOGRAM REPORT: CPT | Mod: 76

## 2020-01-01 PROCEDURE — 80053 COMPREHEN METABOLIC PANEL: CPT

## 2020-01-01 PROCEDURE — 87040 BLOOD CULTURE FOR BACTERIA: CPT

## 2020-01-01 PROCEDURE — C1769: CPT

## 2020-01-01 PROCEDURE — 93306 TTE W/DOPPLER COMPLETE: CPT | Mod: 26

## 2020-01-01 PROCEDURE — 95716 VEEG EA 12-26HR CONT MNTR: CPT

## 2020-01-01 PROCEDURE — 99223 1ST HOSP IP/OBS HIGH 75: CPT

## 2020-01-01 PROCEDURE — 70450 CT HEAD/BRAIN W/O DYE: CPT

## 2020-01-01 PROCEDURE — 96365 THER/PROPH/DIAG IV INF INIT: CPT

## 2020-01-01 PROCEDURE — 99497 ADVNCD CARE PLAN 30 MIN: CPT

## 2020-01-01 PROCEDURE — 99232 SBSQ HOSP IP/OBS MODERATE 35: CPT | Mod: GC

## 2020-01-01 PROCEDURE — 99292 CRITICAL CARE ADDL 30 MIN: CPT

## 2020-01-01 PROCEDURE — 82550 ASSAY OF CK (CPK): CPT

## 2020-01-01 PROCEDURE — 76870 US EXAM SCROTUM: CPT | Mod: 26

## 2020-01-01 PROCEDURE — 87070 CULTURE OTHR SPECIMN AEROBIC: CPT

## 2020-01-01 PROCEDURE — 84132 ASSAY OF SERUM POTASSIUM: CPT

## 2020-01-01 PROCEDURE — 0225U NFCT DS DNA&RNA 21 SARSCOV2: CPT

## 2020-01-01 PROCEDURE — 99497 ADVNCD CARE PLAN 30 MIN: CPT | Mod: 25

## 2020-01-01 PROCEDURE — 82553 CREATINE MB FRACTION: CPT

## 2020-01-01 PROCEDURE — 71045 X-RAY EXAM CHEST 1 VIEW: CPT | Mod: 26,76

## 2020-01-01 PROCEDURE — 99233 SBSQ HOSP IP/OBS HIGH 50: CPT | Mod: 24,25

## 2020-01-01 PROCEDURE — C1894: CPT

## 2020-01-01 PROCEDURE — 85610 PROTHROMBIN TIME: CPT

## 2020-01-01 PROCEDURE — 33225 L VENTRIC PACING LEAD ADD-ON: CPT

## 2020-01-01 PROCEDURE — 83880 ASSAY OF NATRIURETIC PEPTIDE: CPT

## 2020-01-01 PROCEDURE — 83605 ASSAY OF LACTIC ACID: CPT

## 2020-01-01 PROCEDURE — 86850 RBC ANTIBODY SCREEN: CPT

## 2020-01-01 PROCEDURE — 82962 GLUCOSE BLOOD TEST: CPT

## 2020-01-01 PROCEDURE — C1753: CPT

## 2020-01-01 PROCEDURE — 80048 BASIC METABOLIC PNL TOTAL CA: CPT

## 2020-01-01 PROCEDURE — 99497 ADVNCD CARE PLAN 30 MIN: CPT | Mod: GC,25

## 2020-01-01 PROCEDURE — 85018 HEMOGLOBIN: CPT

## 2020-01-01 PROCEDURE — 71045 X-RAY EXAM CHEST 1 VIEW: CPT | Mod: 26,77

## 2020-01-01 PROCEDURE — 99233 SBSQ HOSP IP/OBS HIGH 50: CPT | Mod: GC

## 2020-01-01 PROCEDURE — 92950 HEART/LUNG RESUSCITATION CPR: CPT

## 2020-01-01 PROCEDURE — 85014 HEMATOCRIT: CPT

## 2020-01-01 PROCEDURE — 86769 SARS-COV-2 COVID-19 ANTIBODY: CPT

## 2020-01-01 PROCEDURE — 94660 CPAP INITIATION&MGMT: CPT

## 2020-01-01 PROCEDURE — 93308 TTE F-UP OR LMTD: CPT | Mod: 26

## 2020-01-01 PROCEDURE — 99291 CRITICAL CARE FIRST HOUR: CPT | Mod: CS

## 2020-01-01 PROCEDURE — 83036 HEMOGLOBIN GLYCOSYLATED A1C: CPT

## 2020-01-01 PROCEDURE — 31500 INSERT EMERGENCY AIRWAY: CPT

## 2020-01-01 PROCEDURE — 93654 COMPRE EP EVAL TX VT: CPT

## 2020-01-01 PROCEDURE — 74176 CT ABD & PELVIS W/O CONTRAST: CPT

## 2020-01-01 PROCEDURE — 86900 BLOOD TYPING SEROLOGIC ABO: CPT

## 2020-01-01 PROCEDURE — 80061 LIPID PANEL: CPT

## 2020-01-01 PROCEDURE — 74176 CT ABD & PELVIS W/O CONTRAST: CPT | Mod: 26

## 2020-01-01 PROCEDURE — 84450 TRANSFERASE (AST) (SGOT): CPT

## 2020-01-01 PROCEDURE — C1777: CPT

## 2020-01-01 PROCEDURE — C1766: CPT

## 2020-01-01 PROCEDURE — 86901 BLOOD TYPING SEROLOGIC RH(D): CPT

## 2020-01-01 PROCEDURE — 93571 IV DOP VEL&/PRESS C FLO 1ST: CPT | Mod: LD

## 2020-01-01 PROCEDURE — 76870 US EXAM SCROTUM: CPT

## 2020-01-01 PROCEDURE — 94640 AIRWAY INHALATION TREATMENT: CPT

## 2020-01-01 PROCEDURE — 99232 SBSQ HOSP IP/OBS MODERATE 35: CPT

## 2020-01-01 PROCEDURE — 82947 ASSAY GLUCOSE BLOOD QUANT: CPT

## 2020-01-01 PROCEDURE — C1874: CPT

## 2020-01-01 PROCEDURE — 87086 URINE CULTURE/COLONY COUNT: CPT

## 2020-01-01 PROCEDURE — 86022 PLATELET ANTIBODIES: CPT

## 2020-01-01 PROCEDURE — 87186 SC STD MICRODIL/AGAR DIL: CPT

## 2020-01-01 PROCEDURE — 33249 INSJ/RPLCMT DEFIB W/LEAD(S): CPT

## 2020-01-01 PROCEDURE — 71045 X-RAY EXAM CHEST 1 VIEW: CPT | Mod: 26,CS

## 2020-01-01 PROCEDURE — 93308 TTE F-UP OR LMTD: CPT

## 2020-01-01 PROCEDURE — C9600: CPT | Mod: LD

## 2020-01-01 PROCEDURE — 92526 ORAL FUNCTION THERAPY: CPT

## 2020-01-01 PROCEDURE — 93662 INTRACARDIAC ECG (ICE): CPT | Mod: 26

## 2020-01-01 PROCEDURE — 99231 SBSQ HOSP IP/OBS SF/LOW 25: CPT

## 2020-01-01 PROCEDURE — C1732: CPT

## 2020-01-01 PROCEDURE — C8929: CPT

## 2020-01-01 PROCEDURE — C1898: CPT

## 2020-01-01 PROCEDURE — 85025 COMPLETE CBC W/AUTO DIFF WBC: CPT

## 2020-01-01 RX ORDER — ADENOSINE 3 MG/ML
6 INJECTION INTRAVENOUS ONCE
Refills: 0 | Status: COMPLETED | OUTPATIENT
Start: 2020-01-01 | End: 2020-01-01

## 2020-01-01 RX ORDER — TAMSULOSIN HYDROCHLORIDE 0.4 MG/1
1 CAPSULE ORAL
Qty: 0 | Refills: 0 | DISCHARGE

## 2020-01-01 RX ORDER — SODIUM CHLORIDE 9 MG/ML
1000 INJECTION, SOLUTION INTRAVENOUS
Refills: 0 | Status: DISCONTINUED | OUTPATIENT
Start: 2020-01-01 | End: 2020-01-01

## 2020-01-01 RX ORDER — IPRATROPIUM/ALBUTEROL SULFATE 18-103MCG
3 AEROSOL WITH ADAPTER (GRAM) INHALATION EVERY 6 HOURS
Refills: 0 | Status: DISCONTINUED | OUTPATIENT
Start: 2020-01-01 | End: 2020-01-01

## 2020-01-01 RX ORDER — AMIODARONE HYDROCHLORIDE 400 MG/1
1 TABLET ORAL
Qty: 900 | Refills: 0 | Status: DISCONTINUED | OUTPATIENT
Start: 2020-01-01 | End: 2020-01-01

## 2020-01-01 RX ORDER — ALBUTEROL 90 UG/1
2.5 AEROSOL, METERED ORAL ONCE
Refills: 0 | Status: COMPLETED | OUTPATIENT
Start: 2020-01-01 | End: 2020-01-01

## 2020-01-01 RX ORDER — ALBUTEROL 90 UG/1
1 AEROSOL, METERED ORAL EVERY 4 HOURS
Refills: 0 | Status: DISCONTINUED | OUTPATIENT
Start: 2020-01-01 | End: 2020-01-01

## 2020-01-01 RX ORDER — CLOPIDOGREL BISULFATE 75 MG/1
0 TABLET, FILM COATED ORAL
Qty: 0 | Refills: 0 | DISCHARGE

## 2020-01-01 RX ORDER — MAGNESIUM SULFATE 500 MG/ML
2 VIAL (ML) INJECTION ONCE
Refills: 0 | Status: COMPLETED | OUTPATIENT
Start: 2020-01-01 | End: 2020-01-01

## 2020-01-01 RX ORDER — PIPERACILLIN AND TAZOBACTAM 4; .5 G/20ML; G/20ML
3.38 INJECTION, POWDER, LYOPHILIZED, FOR SOLUTION INTRAVENOUS EVERY 8 HOURS
Refills: 0 | Status: DISCONTINUED | OUTPATIENT
Start: 2020-01-01 | End: 2020-01-01

## 2020-01-01 RX ORDER — SODIUM CHLORIDE 9 MG/ML
1000 INJECTION INTRAMUSCULAR; INTRAVENOUS; SUBCUTANEOUS
Refills: 0 | Status: DISCONTINUED | OUTPATIENT
Start: 2020-01-01 | End: 2020-01-01

## 2020-01-01 RX ORDER — POTASSIUM CHLORIDE 20 MEQ
20 PACKET (EA) ORAL ONCE
Refills: 0 | Status: COMPLETED | OUTPATIENT
Start: 2020-01-01 | End: 2020-01-01

## 2020-01-01 RX ORDER — FUROSEMIDE 40 MG
20 TABLET ORAL ONCE
Refills: 0 | Status: COMPLETED | OUTPATIENT
Start: 2020-01-01 | End: 2020-01-01

## 2020-01-01 RX ORDER — BUPROPION HYDROCHLORIDE 150 MG/1
150 TABLET, EXTENDED RELEASE ORAL DAILY
Refills: 0 | Status: DISCONTINUED | OUTPATIENT
Start: 2020-01-01 | End: 2020-01-01

## 2020-01-01 RX ORDER — SACUBITRIL AND VALSARTAN 24; 26 MG/1; MG/1
1 TABLET, FILM COATED ORAL
Qty: 60 | Refills: 0
Start: 2020-01-01 | End: 2020-01-01

## 2020-01-01 RX ORDER — MORPHINE SULFATE 50 MG/1
1 CAPSULE, EXTENDED RELEASE ORAL ONCE
Refills: 0 | Status: DISCONTINUED | OUTPATIENT
Start: 2020-01-01 | End: 2020-01-01

## 2020-01-01 RX ORDER — FUROSEMIDE 40 MG
40 TABLET ORAL ONCE
Refills: 0 | Status: COMPLETED | OUTPATIENT
Start: 2020-01-01 | End: 2020-01-01

## 2020-01-01 RX ORDER — CEFTRIAXONE 500 MG/1
1000 INJECTION, POWDER, FOR SOLUTION INTRAMUSCULAR; INTRAVENOUS ONCE
Refills: 0 | Status: COMPLETED | OUTPATIENT
Start: 2020-01-01 | End: 2020-01-01

## 2020-01-01 RX ORDER — PIPERACILLIN AND TAZOBACTAM 4; .5 G/20ML; G/20ML
3.38 INJECTION, POWDER, LYOPHILIZED, FOR SOLUTION INTRAVENOUS ONCE
Refills: 0 | Status: COMPLETED | OUTPATIENT
Start: 2020-01-01 | End: 2020-01-01

## 2020-01-01 RX ORDER — ATROPINE SULFATE 1 %
4 DROPS OPHTHALMIC (EYE) EVERY 4 HOURS
Refills: 0 | Status: DISCONTINUED | OUTPATIENT
Start: 2020-01-01 | End: 2020-01-01

## 2020-01-01 RX ORDER — HEPARIN SODIUM 5000 [USP'U]/ML
5000 INJECTION INTRAVENOUS; SUBCUTANEOUS EVERY 8 HOURS
Refills: 0 | Status: DISCONTINUED | OUTPATIENT
Start: 2020-01-01 | End: 2020-01-01

## 2020-01-01 RX ORDER — MIDAZOLAM HYDROCHLORIDE 1 MG/ML
2 INJECTION, SOLUTION INTRAMUSCULAR; INTRAVENOUS ONCE
Refills: 0 | Status: DISCONTINUED | OUTPATIENT
Start: 2020-01-01 | End: 2020-01-01

## 2020-01-01 RX ORDER — POTASSIUM CHLORIDE 20 MEQ
20 PACKET (EA) ORAL
Refills: 0 | Status: DISCONTINUED | OUTPATIENT
Start: 2020-01-01 | End: 2020-01-01

## 2020-01-01 RX ORDER — POTASSIUM CHLORIDE 20 MEQ
10 PACKET (EA) ORAL
Refills: 0 | Status: COMPLETED | OUTPATIENT
Start: 2020-01-01 | End: 2020-01-01

## 2020-01-01 RX ORDER — OXYCODONE HYDROCHLORIDE 5 MG/1
5 TABLET ORAL EVERY 6 HOURS
Refills: 0 | Status: DISCONTINUED | OUTPATIENT
Start: 2020-01-01 | End: 2020-01-01

## 2020-01-01 RX ORDER — SACCHAROMYCES BOULARDII 250 MG
250 POWDER IN PACKET (EA) ORAL
Refills: 0 | Status: DISCONTINUED | OUTPATIENT
Start: 2020-01-01 | End: 2020-01-01

## 2020-01-01 RX ORDER — CLOPIDOGREL BISULFATE 75 MG/1
75 TABLET, FILM COATED ORAL DAILY
Refills: 0 | Status: DISCONTINUED | OUTPATIENT
Start: 2020-01-01 | End: 2020-01-01

## 2020-01-01 RX ORDER — PANTOPRAZOLE SODIUM 20 MG/1
40 TABLET, DELAYED RELEASE ORAL
Refills: 0 | Status: DISCONTINUED | OUTPATIENT
Start: 2020-01-01 | End: 2020-01-01

## 2020-01-01 RX ORDER — AMIODARONE HYDROCHLORIDE 400 MG/1
150 TABLET ORAL ONCE
Refills: 0 | Status: COMPLETED | OUTPATIENT
Start: 2020-01-01 | End: 2020-01-01

## 2020-01-01 RX ORDER — AMIODARONE HYDROCHLORIDE 400 MG/1
2 TABLET ORAL
Qty: 6 | Refills: 0
Start: 2020-01-01 | End: 2020-01-01

## 2020-01-01 RX ORDER — ADENOSINE 3 MG/ML
12 INJECTION INTRAVENOUS ONCE
Refills: 0 | Status: COMPLETED | OUTPATIENT
Start: 2020-01-01 | End: 2020-01-01

## 2020-01-01 RX ORDER — LIDOCAINE HCL 20 MG/ML
50 VIAL (ML) INJECTION ONCE
Refills: 0 | Status: COMPLETED | OUTPATIENT
Start: 2020-01-01 | End: 2020-01-01

## 2020-01-01 RX ORDER — MORPHINE SULFATE 50 MG/1
2 CAPSULE, EXTENDED RELEASE ORAL ONCE
Refills: 0 | Status: DISCONTINUED | OUTPATIENT
Start: 2020-01-01 | End: 2020-01-01

## 2020-01-01 RX ORDER — POTASSIUM CHLORIDE 20 MEQ
40 PACKET (EA) ORAL ONCE
Refills: 0 | Status: COMPLETED | OUTPATIENT
Start: 2020-01-01 | End: 2020-01-01

## 2020-01-01 RX ORDER — CHLORHEXIDINE GLUCONATE 213 G/1000ML
15 SOLUTION TOPICAL EVERY 12 HOURS
Refills: 0 | Status: DISCONTINUED | OUTPATIENT
Start: 2020-01-01 | End: 2020-01-01

## 2020-01-01 RX ORDER — FAMOTIDINE 10 MG/ML
20 INJECTION INTRAVENOUS ONCE
Refills: 0 | Status: COMPLETED | OUTPATIENT
Start: 2020-01-01 | End: 2020-01-01

## 2020-01-01 RX ORDER — BUPROPION HYDROCHLORIDE 150 MG/1
150 TABLET, EXTENDED RELEASE ORAL DAILY
Refills: 0 | Status: ACTIVE | COMMUNITY

## 2020-01-01 RX ORDER — VANCOMYCIN HCL 1 G
1000 VIAL (EA) INTRAVENOUS ONCE
Refills: 0 | Status: COMPLETED | OUTPATIENT
Start: 2020-01-01 | End: 2020-01-01

## 2020-01-01 RX ORDER — ATORVASTATIN CALCIUM 80 MG/1
1 TABLET, FILM COATED ORAL
Qty: 0 | Refills: 0 | DISCHARGE
Start: 2020-01-01

## 2020-01-01 RX ORDER — SODIUM CHLORIDE 9 MG/ML
500 INJECTION, SOLUTION INTRAVENOUS ONCE
Refills: 0 | Status: COMPLETED | OUTPATIENT
Start: 2020-01-01 | End: 2020-01-01

## 2020-01-01 RX ORDER — AMIODARONE HYDROCHLORIDE 400 MG/1
1 TABLET ORAL
Qty: 60 | Refills: 0
Start: 2020-01-01 | End: 2020-01-01

## 2020-01-01 RX ORDER — DIPHENHYDRAMINE HCL 50 MG
0 CAPSULE ORAL
Qty: 0 | Refills: 0 | DISCHARGE

## 2020-01-01 RX ORDER — AMIODARONE HYDROCHLORIDE 400 MG/1
200 TABLET ORAL EVERY 12 HOURS
Refills: 0 | Status: CANCELLED | OUTPATIENT
Start: 2020-01-01 | End: 2020-01-01

## 2020-01-01 RX ORDER — SODIUM CHLORIDE 9 MG/ML
1000 INJECTION INTRAMUSCULAR; INTRAVENOUS; SUBCUTANEOUS ONCE
Refills: 0 | Status: COMPLETED | OUTPATIENT
Start: 2020-01-01 | End: 2020-01-01

## 2020-01-01 RX ORDER — PANTOPRAZOLE SODIUM 20 MG/1
1 TABLET, DELAYED RELEASE ORAL
Qty: 30 | Refills: 0
Start: 2020-01-01 | End: 2020-01-01

## 2020-01-01 RX ORDER — APIXABAN 2.5 MG/1
5 TABLET, FILM COATED ORAL
Refills: 0 | Status: DISCONTINUED | OUTPATIENT
Start: 2020-01-01 | End: 2020-01-01

## 2020-01-01 RX ORDER — MAGNESIUM SULFATE 500 MG/ML
1 VIAL (ML) INJECTION ONCE
Refills: 0 | Status: COMPLETED | OUTPATIENT
Start: 2020-01-01 | End: 2020-01-01

## 2020-01-01 RX ORDER — APIXABAN 5 MG/1
5 TABLET, FILM COATED ORAL
Qty: 60 | Refills: 3 | Status: ACTIVE | COMMUNITY

## 2020-01-01 RX ORDER — ACETAMINOPHEN 500 MG
975 TABLET ORAL ONCE
Refills: 0 | Status: COMPLETED | OUTPATIENT
Start: 2020-01-01 | End: 2020-01-01

## 2020-01-01 RX ORDER — HYDROCORTISONE 20 MG
50 TABLET ORAL EVERY 8 HOURS
Refills: 0 | Status: DISCONTINUED | OUTPATIENT
Start: 2020-01-01 | End: 2020-01-01

## 2020-01-01 RX ORDER — ACETAMINOPHEN 500 MG
650 TABLET ORAL EVERY 6 HOURS
Refills: 0 | Status: DISCONTINUED | OUTPATIENT
Start: 2020-01-01 | End: 2020-01-01

## 2020-01-01 RX ORDER — MAGNESIUM OXIDE 400 MG ORAL TABLET 241.3 MG
400 TABLET ORAL
Refills: 0 | Status: COMPLETED | OUTPATIENT
Start: 2020-01-01 | End: 2020-01-01

## 2020-01-01 RX ORDER — ATORVASTATIN CALCIUM 80 MG/1
1 TABLET, FILM COATED ORAL
Qty: 0 | Refills: 0 | DISCHARGE

## 2020-01-01 RX ORDER — ATORVASTATIN CALCIUM 80 MG/1
0 TABLET, FILM COATED ORAL
Qty: 0 | Refills: 0 | DISCHARGE

## 2020-01-01 RX ORDER — PROPOFOL 10 MG/ML
20 INJECTION, EMULSION INTRAVENOUS
Qty: 1000 | Refills: 0 | Status: DISCONTINUED | OUTPATIENT
Start: 2020-01-01 | End: 2020-01-01

## 2020-01-01 RX ORDER — EPINEPHRINE 0.3 MG/.3ML
0.8 INJECTION INTRAMUSCULAR; SUBCUTANEOUS
Qty: 8 | Refills: 0 | Status: DISCONTINUED | OUTPATIENT
Start: 2020-01-01 | End: 2020-01-01

## 2020-01-01 RX ORDER — BUPROPION HYDROCHLORIDE 150 MG/1
1 TABLET, EXTENDED RELEASE ORAL
Qty: 0 | Refills: 0 | DISCHARGE

## 2020-01-01 RX ORDER — DEXTROSE 50 % IN WATER 50 %
50 SYRINGE (ML) INTRAVENOUS ONCE
Refills: 0 | Status: COMPLETED | OUTPATIENT
Start: 2020-01-01 | End: 2020-01-01

## 2020-01-01 RX ORDER — ACETYLCYSTEINE 200 MG/ML
1 VIAL (ML) MISCELLANEOUS EVERY 6 HOURS
Refills: 0 | Status: COMPLETED | OUTPATIENT
Start: 2020-01-01 | End: 2020-01-01

## 2020-01-01 RX ORDER — ACETAMINOPHEN 325 MG/1
325 TABLET ORAL EVERY 6 HOURS
Refills: 0 | Status: ACTIVE | COMMUNITY

## 2020-01-01 RX ORDER — TAMSULOSIN HYDROCHLORIDE 0.4 MG/1
0.4 CAPSULE ORAL AT BEDTIME
Refills: 0 | Status: DISCONTINUED | OUTPATIENT
Start: 2020-01-01 | End: 2020-01-01

## 2020-01-01 RX ORDER — MIDODRINE HYDROCHLORIDE 2.5 MG/1
10 TABLET ORAL EVERY 8 HOURS
Refills: 0 | Status: DISCONTINUED | OUTPATIENT
Start: 2020-01-01 | End: 2020-01-01

## 2020-01-01 RX ORDER — ATORVASTATIN CALCIUM 80 MG/1
20 TABLET, FILM COATED ORAL AT BEDTIME
Refills: 0 | Status: DISCONTINUED | OUTPATIENT
Start: 2020-01-01 | End: 2020-01-01

## 2020-01-01 RX ORDER — MEXILETINE HYDROCHLORIDE 150 MG/1
200 CAPSULE ORAL EVERY 12 HOURS
Refills: 0 | Status: DISCONTINUED | OUTPATIENT
Start: 2020-01-01 | End: 2020-01-01

## 2020-01-01 RX ORDER — SODIUM POLYSTYRENE SULFONATE 4.1 MEQ/G
30 POWDER, FOR SUSPENSION ORAL ONCE
Refills: 0 | Status: COMPLETED | OUTPATIENT
Start: 2020-01-01 | End: 2020-01-01

## 2020-01-01 RX ORDER — ASPIRIN/CALCIUM CARB/MAGNESIUM 324 MG
1 TABLET ORAL
Qty: 0 | Refills: 0 | DISCHARGE

## 2020-01-01 RX ORDER — SACUBITRIL AND VALSARTAN 24; 26 MG/1; MG/1
1 TABLET, FILM COATED ORAL
Refills: 0 | Status: DISCONTINUED | OUTPATIENT
Start: 2020-01-01 | End: 2020-01-01

## 2020-01-01 RX ORDER — CHLORHEXIDINE GLUCONATE 213 G/1000ML
1 SOLUTION TOPICAL
Refills: 0 | Status: DISCONTINUED | OUTPATIENT
Start: 2020-01-01 | End: 2020-01-01

## 2020-01-01 RX ORDER — METOPROLOL TARTRATE 50 MG
1 TABLET ORAL
Qty: 0 | Refills: 0 | DISCHARGE

## 2020-01-01 RX ORDER — METOPROLOL TARTRATE 50 MG
12.5 TABLET ORAL
Refills: 0 | Status: DISCONTINUED | OUTPATIENT
Start: 2020-01-01 | End: 2020-01-01

## 2020-01-01 RX ORDER — CLOPIDOGREL BISULFATE 75 MG/1
1 TABLET, FILM COATED ORAL
Qty: 0 | Refills: 0 | DISCHARGE

## 2020-01-01 RX ORDER — ACETYLCYSTEINE 200 MG/ML
3 VIAL (ML) MISCELLANEOUS EVERY 6 HOURS
Refills: 0 | Status: DISCONTINUED | OUTPATIENT
Start: 2020-01-01 | End: 2020-01-01

## 2020-01-01 RX ORDER — TRAMADOL HYDROCHLORIDE 50 MG/1
50 TABLET ORAL THREE TIMES A DAY
Refills: 0 | Status: DISCONTINUED | OUTPATIENT
Start: 2020-01-01 | End: 2020-01-01

## 2020-01-01 RX ORDER — EPINEPHRINE 0.3 MG/.3ML
0.5 INJECTION INTRAMUSCULAR; SUBCUTANEOUS
Qty: 8 | Refills: 0 | Status: DISCONTINUED | OUTPATIENT
Start: 2020-01-01 | End: 2020-01-01

## 2020-01-01 RX ORDER — CALCIUM GLUCONATE 100 MG/ML
1 VIAL (ML) INTRAVENOUS ONCE
Refills: 0 | Status: COMPLETED | OUTPATIENT
Start: 2020-01-01 | End: 2020-01-01

## 2020-01-01 RX ORDER — ASPIRIN/CALCIUM CARB/MAGNESIUM 324 MG
0 TABLET ORAL
Qty: 0 | Refills: 0 | DISCHARGE

## 2020-01-01 RX ORDER — PANTOPRAZOLE SODIUM 20 MG/1
40 TABLET, DELAYED RELEASE ORAL DAILY
Refills: 0 | Status: DISCONTINUED | OUTPATIENT
Start: 2020-01-01 | End: 2020-01-01

## 2020-01-01 RX ORDER — LISINOPRIL 2.5 MG/1
5 TABLET ORAL DAILY
Refills: 0 | Status: DISCONTINUED | OUTPATIENT
Start: 2020-01-01 | End: 2020-01-01

## 2020-01-01 RX ORDER — TIOTROPIUM BROMIDE 18 UG/1
1 CAPSULE ORAL; RESPIRATORY (INHALATION) DAILY
Refills: 0 | Status: DISCONTINUED | OUTPATIENT
Start: 2020-01-01 | End: 2020-01-01

## 2020-01-01 RX ORDER — MIDODRINE HYDROCHLORIDE 2.5 MG/1
20 TABLET ORAL EVERY 8 HOURS
Refills: 0 | Status: DISCONTINUED | OUTPATIENT
Start: 2020-01-01 | End: 2020-01-01

## 2020-01-01 RX ORDER — LIDOCAINE HCL 20 MG/ML
2 VIAL (ML) INJECTION
Qty: 2 | Refills: 0 | Status: DISCONTINUED | OUTPATIENT
Start: 2020-01-01 | End: 2020-01-01

## 2020-01-01 RX ORDER — ASPIRIN/CALCIUM CARB/MAGNESIUM 324 MG
81 TABLET ORAL DAILY
Refills: 0 | Status: DISCONTINUED | OUTPATIENT
Start: 2020-01-01 | End: 2020-01-01

## 2020-01-01 RX ORDER — SODIUM CHLORIDE 9 MG/ML
1000 INJECTION INTRAMUSCULAR; INTRAVENOUS; SUBCUTANEOUS
Refills: 0 | Status: COMPLETED | OUTPATIENT
Start: 2020-01-01 | End: 2020-01-01

## 2020-01-01 RX ORDER — AMIODARONE HYDROCHLORIDE 400 MG/1
200 TABLET ORAL
Refills: 0 | Status: DISCONTINUED | OUTPATIENT
Start: 2020-01-01 | End: 2020-01-01

## 2020-01-01 RX ORDER — IPRATROPIUM/ALBUTEROL SULFATE 18-103MCG
3 AEROSOL WITH ADAPTER (GRAM) INHALATION ONCE
Refills: 0 | Status: DISCONTINUED | OUTPATIENT
Start: 2020-01-01 | End: 2020-01-01

## 2020-01-01 RX ORDER — ENOXAPARIN SODIUM 100 MG/ML
40 INJECTION SUBCUTANEOUS DAILY
Refills: 0 | Status: DISCONTINUED | OUTPATIENT
Start: 2020-01-01 | End: 2020-01-01

## 2020-01-01 RX ORDER — DEXMEDETOMIDINE HYDROCHLORIDE IN 0.9% SODIUM CHLORIDE 4 UG/ML
0.5 INJECTION INTRAVENOUS
Qty: 200 | Refills: 0 | Status: DISCONTINUED | OUTPATIENT
Start: 2020-01-01 | End: 2020-01-01

## 2020-01-01 RX ORDER — APIXABAN 2.5 MG/1
1 TABLET, FILM COATED ORAL
Qty: 60 | Refills: 0
Start: 2020-01-01 | End: 2020-01-01

## 2020-01-01 RX ORDER — ONDANSETRON 8 MG/1
4 TABLET, FILM COATED ORAL EVERY 6 HOURS
Refills: 0 | Status: DISCONTINUED | OUTPATIENT
Start: 2020-01-01 | End: 2020-01-01

## 2020-01-01 RX ORDER — PANTOPRAZOLE SODIUM 40 MG/1
40 TABLET, DELAYED RELEASE ORAL
Qty: 90 | Refills: 0 | Status: ACTIVE | COMMUNITY

## 2020-01-01 RX ORDER — AMIODARONE HYDROCHLORIDE 400 MG/1
400 TABLET ORAL EVERY 12 HOURS
Refills: 0 | Status: DISCONTINUED | OUTPATIENT
Start: 2020-01-01 | End: 2020-01-01

## 2020-01-01 RX ORDER — METOPROLOL TARTRATE 50 MG
25 TABLET ORAL DAILY
Refills: 0 | Status: DISCONTINUED | OUTPATIENT
Start: 2020-01-01 | End: 2020-01-01

## 2020-01-01 RX ORDER — CLOPIDOGREL BISULFATE 75 MG/1
75 TABLET, FILM COATED ORAL
Qty: 90 | Refills: 1 | Status: ACTIVE | COMMUNITY

## 2020-01-01 RX ORDER — SODIUM CHLORIDE 9 MG/ML
500 INJECTION INTRAMUSCULAR; INTRAVENOUS; SUBCUTANEOUS ONCE
Refills: 0 | Status: DISCONTINUED | OUTPATIENT
Start: 2020-01-01 | End: 2020-01-01

## 2020-01-01 RX ORDER — ROBINUL 0.2 MG/ML
0.2 INJECTION INTRAMUSCULAR; INTRAVENOUS EVERY 8 HOURS
Refills: 0 | Status: DISCONTINUED | OUTPATIENT
Start: 2020-01-01 | End: 2020-01-01

## 2020-01-01 RX ORDER — FLUOXETINE HCL 10 MG
0 CAPSULE ORAL
Qty: 0 | Refills: 0 | DISCHARGE

## 2020-01-01 RX ORDER — SODIUM BICARBONATE 1 MEQ/ML
0.39 SYRINGE (ML) INTRAVENOUS
Qty: 150 | Refills: 0 | Status: DISCONTINUED | OUTPATIENT
Start: 2020-01-01 | End: 2020-01-01

## 2020-01-01 RX ORDER — APIXABAN 2.5 MG/1
5 TABLET, FILM COATED ORAL EVERY 12 HOURS
Refills: 0 | Status: DISCONTINUED | OUTPATIENT
Start: 2020-01-01 | End: 2020-01-01

## 2020-01-01 RX ORDER — NYSTATIN CREAM 100000 [USP'U]/G
1 CREAM TOPICAL
Refills: 0 | Status: DISCONTINUED | OUTPATIENT
Start: 2020-01-01 | End: 2020-01-01

## 2020-01-01 RX ORDER — TRAMADOL HYDROCHLORIDE 50 MG/1
50 TABLET ORAL ONCE
Refills: 0 | Status: DISCONTINUED | OUTPATIENT
Start: 2020-01-01 | End: 2020-01-01

## 2020-01-01 RX ORDER — MIDODRINE HYDROCHLORIDE 2.5 MG/1
10 TABLET ORAL THREE TIMES A DAY
Refills: 0 | Status: DISCONTINUED | OUTPATIENT
Start: 2020-01-01 | End: 2020-01-01

## 2020-01-01 RX ORDER — METOPROLOL TARTRATE 50 MG
1 TABLET ORAL
Qty: 30 | Refills: 0
Start: 2020-01-01 | End: 2020-01-01

## 2020-01-01 RX ORDER — NOREPINEPHRINE BITARTRATE/D5W 8 MG/250ML
0.05 PLASTIC BAG, INJECTION (ML) INTRAVENOUS
Qty: 8 | Refills: 0 | Status: DISCONTINUED | OUTPATIENT
Start: 2020-01-01 | End: 2020-01-01

## 2020-01-01 RX ORDER — TRAMADOL HYDROCHLORIDE 50 MG/1
25 TABLET ORAL
Refills: 0 | Status: DISCONTINUED | OUTPATIENT
Start: 2020-01-01 | End: 2020-01-01

## 2020-01-01 RX ORDER — ACETAMINOPHEN 500 MG
2 TABLET ORAL
Qty: 0 | Refills: 0 | DISCHARGE
Start: 2020-01-01

## 2020-01-01 RX ORDER — SODIUM CHLORIDE 9 MG/ML
500 INJECTION INTRAMUSCULAR; INTRAVENOUS; SUBCUTANEOUS ONCE
Refills: 0 | Status: COMPLETED | OUTPATIENT
Start: 2020-01-01 | End: 2020-01-01

## 2020-01-01 RX ORDER — ACETAMINOPHEN 500 MG
1000 TABLET ORAL ONCE
Refills: 0 | Status: COMPLETED | OUTPATIENT
Start: 2020-01-01 | End: 2020-01-01

## 2020-01-01 RX ORDER — POTASSIUM CHLORIDE 20 MEQ
40 PACKET (EA) ORAL EVERY 4 HOURS
Refills: 0 | Status: COMPLETED | OUTPATIENT
Start: 2020-01-01 | End: 2020-01-01

## 2020-01-01 RX ORDER — OLANZAPINE 15 MG/1
5 TABLET, FILM COATED ORAL ONCE
Refills: 0 | Status: COMPLETED | OUTPATIENT
Start: 2020-01-01 | End: 2020-01-01

## 2020-01-01 RX ORDER — MIDODRINE HYDROCHLORIDE 2.5 MG/1
1 TABLET ORAL
Qty: 90 | Refills: 0
Start: 2020-01-01 | End: 2020-01-01

## 2020-01-01 RX ORDER — SENNA PLUS 8.6 MG/1
2 TABLET ORAL AT BEDTIME
Refills: 0 | Status: DISCONTINUED | OUTPATIENT
Start: 2020-01-01 | End: 2020-01-01

## 2020-01-01 RX ORDER — TAMSULOSIN HYDROCHLORIDE 0.4 MG/1
0.4 CAPSULE ORAL
Refills: 0 | Status: ACTIVE | COMMUNITY

## 2020-01-01 RX ORDER — ALBUTEROL 90 UG/1
2 AEROSOL, METERED ORAL
Qty: 0 | Refills: 0 | DISCHARGE

## 2020-01-01 RX ORDER — MIDODRINE HYDROCHLORIDE 10 MG/1
10 TABLET ORAL 3 TIMES DAILY
Refills: 0 | Status: ACTIVE | COMMUNITY

## 2020-01-01 RX ORDER — LIDOCAINE 4 G/100G
1 CREAM TOPICAL DAILY
Refills: 0 | Status: DISCONTINUED | OUTPATIENT
Start: 2020-01-01 | End: 2020-01-01

## 2020-01-01 RX ORDER — MIDODRINE HYDROCHLORIDE 2.5 MG/1
15 TABLET ORAL EVERY 8 HOURS
Refills: 0 | Status: DISCONTINUED | OUTPATIENT
Start: 2020-01-01 | End: 2020-01-01

## 2020-01-01 RX ORDER — ENOXAPARIN SODIUM 100 MG/ML
30 INJECTION SUBCUTANEOUS DAILY
Refills: 0 | Status: DISCONTINUED | OUTPATIENT
Start: 2020-01-01 | End: 2020-01-01

## 2020-01-01 RX ORDER — AMIODARONE HYDROCHLORIDE 400 MG/1
0.5 TABLET ORAL
Qty: 900 | Refills: 0 | Status: DISCONTINUED | OUTPATIENT
Start: 2020-01-01 | End: 2020-01-01

## 2020-01-01 RX ORDER — FUROSEMIDE 40 MG
40 TABLET ORAL DAILY
Refills: 0 | Status: DISCONTINUED | OUTPATIENT
Start: 2020-01-01 | End: 2020-01-01

## 2020-01-01 RX ORDER — HYDROMORPHONE HYDROCHLORIDE 2 MG/ML
1 INJECTION INTRAMUSCULAR; INTRAVENOUS; SUBCUTANEOUS
Refills: 0 | Status: DISCONTINUED | OUTPATIENT
Start: 2020-01-01 | End: 2020-01-01

## 2020-01-01 RX ORDER — METOPROLOL TARTRATE 50 MG
12.5 TABLET ORAL DAILY
Refills: 0 | Status: DISCONTINUED | OUTPATIENT
Start: 2020-01-01 | End: 2020-01-01

## 2020-01-01 RX ORDER — AZITHROMYCIN 500 MG/1
500 TABLET, FILM COATED ORAL ONCE
Refills: 0 | Status: COMPLETED | OUTPATIENT
Start: 2020-01-01 | End: 2020-01-01

## 2020-01-01 RX ORDER — LANOLIN ALCOHOL/MO/W.PET/CERES
5 CREAM (GRAM) TOPICAL AT BEDTIME
Refills: 0 | Status: DISCONTINUED | OUTPATIENT
Start: 2020-01-01 | End: 2020-01-01

## 2020-01-01 RX ADMIN — AMIODARONE HYDROCHLORIDE 400 MILLIGRAM(S): 400 TABLET ORAL at 16:47

## 2020-01-01 RX ADMIN — Medication 1 MILLILITER(S): at 08:20

## 2020-01-01 RX ADMIN — MORPHINE SULFATE 2 MILLIGRAM(S): 50 CAPSULE, EXTENDED RELEASE ORAL at 10:45

## 2020-01-01 RX ADMIN — EPINEPHRINE 146 MICROGRAM(S)/KG/MIN: 0.3 INJECTION INTRAMUSCULAR; SUBCUTANEOUS at 03:06

## 2020-01-01 RX ADMIN — TAMSULOSIN HYDROCHLORIDE 0.4 MILLIGRAM(S): 0.4 CAPSULE ORAL at 22:09

## 2020-01-01 RX ADMIN — HEPARIN SODIUM 5000 UNIT(S): 5000 INJECTION INTRAVENOUS; SUBCUTANEOUS at 20:42

## 2020-01-01 RX ADMIN — ATORVASTATIN CALCIUM 20 MILLIGRAM(S): 80 TABLET, FILM COATED ORAL at 22:09

## 2020-01-01 RX ADMIN — LIDOCAINE 1 PATCH: 4 CREAM TOPICAL at 17:34

## 2020-01-01 RX ADMIN — TRAMADOL HYDROCHLORIDE 50 MILLIGRAM(S): 50 TABLET ORAL at 21:12

## 2020-01-01 RX ADMIN — AMIODARONE HYDROCHLORIDE 400 MILLIGRAM(S): 400 TABLET ORAL at 18:08

## 2020-01-01 RX ADMIN — TRAMADOL HYDROCHLORIDE 50 MILLIGRAM(S): 50 TABLET ORAL at 15:49

## 2020-01-01 RX ADMIN — BUPROPION HYDROCHLORIDE 150 MILLIGRAM(S): 150 TABLET, EXTENDED RELEASE ORAL at 11:39

## 2020-01-01 RX ADMIN — CHLORHEXIDINE GLUCONATE 1 APPLICATION(S): 213 SOLUTION TOPICAL at 05:10

## 2020-01-01 RX ADMIN — Medication 25 MILLIGRAM(S): at 05:06

## 2020-01-01 RX ADMIN — HEPARIN SODIUM 5000 UNIT(S): 5000 INJECTION INTRAVENOUS; SUBCUTANEOUS at 15:07

## 2020-01-01 RX ADMIN — TAMSULOSIN HYDROCHLORIDE 0.4 MILLIGRAM(S): 0.4 CAPSULE ORAL at 21:33

## 2020-01-01 RX ADMIN — AMIODARONE HYDROCHLORIDE 400 MILLIGRAM(S): 400 TABLET ORAL at 23:02

## 2020-01-01 RX ADMIN — Medication 1 MILLILITER(S): at 15:51

## 2020-01-01 RX ADMIN — FAMOTIDINE 20 MILLIGRAM(S): 10 INJECTION INTRAVENOUS at 08:41

## 2020-01-01 RX ADMIN — Medication 81 MILLIGRAM(S): at 10:39

## 2020-01-01 RX ADMIN — TRAMADOL HYDROCHLORIDE 50 MILLIGRAM(S): 50 TABLET ORAL at 19:23

## 2020-01-01 RX ADMIN — MORPHINE SULFATE 2 MILLIGRAM(S): 50 CAPSULE, EXTENDED RELEASE ORAL at 15:07

## 2020-01-01 RX ADMIN — Medication 81 MILLIGRAM(S): at 11:16

## 2020-01-01 RX ADMIN — HYDROMORPHONE HYDROCHLORIDE 1 MILLIGRAM(S): 2 INJECTION INTRAMUSCULAR; INTRAVENOUS; SUBCUTANEOUS at 13:35

## 2020-01-01 RX ADMIN — Medication 22.5 MG/MIN: at 09:14

## 2020-01-01 RX ADMIN — Medication 250 MILLIGRAM(S): at 22:12

## 2020-01-01 RX ADMIN — LISINOPRIL 5 MILLIGRAM(S): 2.5 TABLET ORAL at 05:33

## 2020-01-01 RX ADMIN — CLOPIDOGREL BISULFATE 75 MILLIGRAM(S): 75 TABLET, FILM COATED ORAL at 21:20

## 2020-01-01 RX ADMIN — Medication 81 MILLIGRAM(S): at 11:33

## 2020-01-01 RX ADMIN — Medication 3 MILLILITER(S): at 08:17

## 2020-01-01 RX ADMIN — HEPARIN SODIUM 5000 UNIT(S): 5000 INJECTION INTRAVENOUS; SUBCUTANEOUS at 21:29

## 2020-01-01 RX ADMIN — CLOPIDOGREL BISULFATE 75 MILLIGRAM(S): 75 TABLET, FILM COATED ORAL at 08:40

## 2020-01-01 RX ADMIN — Medication 400 MILLIGRAM(S): at 02:45

## 2020-01-01 RX ADMIN — TRAMADOL HYDROCHLORIDE 50 MILLIGRAM(S): 50 TABLET ORAL at 03:15

## 2020-01-01 RX ADMIN — Medication 50 GRAM(S): at 03:08

## 2020-01-01 RX ADMIN — NYSTATIN CREAM 1 APPLICATION(S): 100000 CREAM TOPICAL at 05:25

## 2020-01-01 RX ADMIN — CLOPIDOGREL BISULFATE 75 MILLIGRAM(S): 75 TABLET, FILM COATED ORAL at 11:27

## 2020-01-01 RX ADMIN — TRAMADOL HYDROCHLORIDE 50 MILLIGRAM(S): 50 TABLET ORAL at 21:17

## 2020-01-01 RX ADMIN — TRAMADOL HYDROCHLORIDE 50 MILLIGRAM(S): 50 TABLET ORAL at 14:54

## 2020-01-01 RX ADMIN — CLOPIDOGREL BISULFATE 75 MILLIGRAM(S): 75 TABLET, FILM COATED ORAL at 11:03

## 2020-01-01 RX ADMIN — Medication 81 MILLIGRAM(S): at 11:56

## 2020-01-01 RX ADMIN — Medication 81 MILLIGRAM(S): at 21:20

## 2020-01-01 RX ADMIN — HYDROMORPHONE HYDROCHLORIDE 1 MILLIGRAM(S): 2 INJECTION INTRAMUSCULAR; INTRAVENOUS; SUBCUTANEOUS at 13:48

## 2020-01-01 RX ADMIN — CLOPIDOGREL BISULFATE 75 MILLIGRAM(S): 75 TABLET, FILM COATED ORAL at 11:15

## 2020-01-01 RX ADMIN — AMIODARONE HYDROCHLORIDE 200 MILLIGRAM(S): 400 TABLET ORAL at 17:08

## 2020-01-01 RX ADMIN — Medication 9.14 MICROGRAM(S)/KG/MIN: at 08:33

## 2020-01-01 RX ADMIN — PIPERACILLIN AND TAZOBACTAM 25 GRAM(S): 4; .5 INJECTION, POWDER, LYOPHILIZED, FOR SOLUTION INTRAVENOUS at 05:43

## 2020-01-01 RX ADMIN — MORPHINE SULFATE 2 MILLIGRAM(S): 50 CAPSULE, EXTENDED RELEASE ORAL at 17:28

## 2020-01-01 RX ADMIN — AMIODARONE HYDROCHLORIDE 200 MILLIGRAM(S): 400 TABLET ORAL at 05:56

## 2020-01-01 RX ADMIN — BUPROPION HYDROCHLORIDE 150 MILLIGRAM(S): 150 TABLET, EXTENDED RELEASE ORAL at 11:27

## 2020-01-01 RX ADMIN — APIXABAN 5 MILLIGRAM(S): 2.5 TABLET, FILM COATED ORAL at 05:42

## 2020-01-01 RX ADMIN — Medication 1 MILLILITER(S): at 20:43

## 2020-01-01 RX ADMIN — LIDOCAINE 1 PATCH: 4 CREAM TOPICAL at 08:40

## 2020-01-01 RX ADMIN — Medication 2 MILLIGRAM(S): at 13:33

## 2020-01-01 RX ADMIN — BUPROPION HYDROCHLORIDE 150 MILLIGRAM(S): 150 TABLET, EXTENDED RELEASE ORAL at 11:16

## 2020-01-01 RX ADMIN — MAGNESIUM OXIDE 400 MG ORAL TABLET 400 MILLIGRAM(S): 241.3 TABLET ORAL at 18:18

## 2020-01-01 RX ADMIN — NYSTATIN CREAM 1 APPLICATION(S): 100000 CREAM TOPICAL at 18:18

## 2020-01-01 RX ADMIN — Medication 15 MG/MIN: at 02:15

## 2020-01-01 RX ADMIN — Medication 650 MILLIGRAM(S): at 20:31

## 2020-01-01 RX ADMIN — MORPHINE SULFATE 1 MILLIGRAM(S): 50 CAPSULE, EXTENDED RELEASE ORAL at 18:03

## 2020-01-01 RX ADMIN — CLOPIDOGREL BISULFATE 75 MILLIGRAM(S): 75 TABLET, FILM COATED ORAL at 11:51

## 2020-01-01 RX ADMIN — CLOPIDOGREL BISULFATE 75 MILLIGRAM(S): 75 TABLET, FILM COATED ORAL at 12:27

## 2020-01-01 RX ADMIN — Medication 9.14 MICROGRAM(S)/KG/MIN: at 08:41

## 2020-01-01 RX ADMIN — SODIUM CHLORIDE 500 MILLILITER(S): 9 INJECTION, SOLUTION INTRAVENOUS at 01:32

## 2020-01-01 RX ADMIN — Medication 9.14 MICROGRAM(S)/KG/MIN: at 20:22

## 2020-01-01 RX ADMIN — Medication 250 MEQ/KG/HR: at 23:38

## 2020-01-01 RX ADMIN — Medication 40 MILLIGRAM(S): at 05:54

## 2020-01-01 RX ADMIN — Medication 50 GRAM(S): at 09:14

## 2020-01-01 RX ADMIN — SACUBITRIL AND VALSARTAN 1 TABLET(S): 24; 26 TABLET, FILM COATED ORAL at 18:08

## 2020-01-01 RX ADMIN — CLOPIDOGREL BISULFATE 75 MILLIGRAM(S): 75 TABLET, FILM COATED ORAL at 07:55

## 2020-01-01 RX ADMIN — ATORVASTATIN CALCIUM 20 MILLIGRAM(S): 80 TABLET, FILM COATED ORAL at 22:37

## 2020-01-01 RX ADMIN — Medication 81 MILLIGRAM(S): at 11:27

## 2020-01-01 RX ADMIN — TRAMADOL HYDROCHLORIDE 50 MILLIGRAM(S): 50 TABLET ORAL at 05:21

## 2020-01-01 RX ADMIN — LIDOCAINE 1 PATCH: 4 CREAM TOPICAL at 07:00

## 2020-01-01 RX ADMIN — MORPHINE SULFATE 2 MILLIGRAM(S): 50 CAPSULE, EXTENDED RELEASE ORAL at 00:00

## 2020-01-01 RX ADMIN — Medication 3 MILLILITER(S): at 15:49

## 2020-01-01 RX ADMIN — Medication 25 MILLIGRAM(S): at 07:55

## 2020-01-01 RX ADMIN — Medication 81 MILLIGRAM(S): at 07:55

## 2020-01-01 RX ADMIN — TRAMADOL HYDROCHLORIDE 50 MILLIGRAM(S): 50 TABLET ORAL at 17:55

## 2020-01-01 RX ADMIN — HEPARIN SODIUM 5000 UNIT(S): 5000 INJECTION INTRAVENOUS; SUBCUTANEOUS at 05:56

## 2020-01-01 RX ADMIN — APIXABAN 5 MILLIGRAM(S): 2.5 TABLET, FILM COATED ORAL at 17:34

## 2020-01-01 RX ADMIN — Medication 650 MILLIGRAM(S): at 20:45

## 2020-01-01 RX ADMIN — Medication 3 MILLILITER(S): at 04:05

## 2020-01-01 RX ADMIN — TRAMADOL HYDROCHLORIDE 50 MILLIGRAM(S): 50 TABLET ORAL at 11:45

## 2020-01-01 RX ADMIN — Medication 40 MILLIEQUIVALENT(S): at 05:43

## 2020-01-01 RX ADMIN — Medication 975 MILLIGRAM(S): at 12:23

## 2020-01-01 RX ADMIN — CLOPIDOGREL BISULFATE 75 MILLIGRAM(S): 75 TABLET, FILM COATED ORAL at 17:34

## 2020-01-01 RX ADMIN — MORPHINE SULFATE 1 MILLIGRAM(S): 50 CAPSULE, EXTENDED RELEASE ORAL at 18:16

## 2020-01-01 RX ADMIN — PIPERACILLIN AND TAZOBACTAM 25 GRAM(S): 4; .5 INJECTION, POWDER, LYOPHILIZED, FOR SOLUTION INTRAVENOUS at 21:57

## 2020-01-01 RX ADMIN — MAGNESIUM OXIDE 400 MG ORAL TABLET 400 MILLIGRAM(S): 241.3 TABLET ORAL at 12:29

## 2020-01-01 RX ADMIN — CHLORHEXIDINE GLUCONATE 15 MILLILITER(S): 213 SOLUTION TOPICAL at 05:24

## 2020-01-01 RX ADMIN — BUPROPION HYDROCHLORIDE 150 MILLIGRAM(S): 150 TABLET, EXTENDED RELEASE ORAL at 11:56

## 2020-01-01 RX ADMIN — HEPARIN SODIUM 5000 UNIT(S): 5000 INJECTION INTRAVENOUS; SUBCUTANEOUS at 14:50

## 2020-01-01 RX ADMIN — NYSTATIN CREAM 1 APPLICATION(S): 100000 CREAM TOPICAL at 18:13

## 2020-01-01 RX ADMIN — Medication 40 MILLIGRAM(S): at 15:47

## 2020-01-01 RX ADMIN — CHLORHEXIDINE GLUCONATE 1 APPLICATION(S): 213 SOLUTION TOPICAL at 06:21

## 2020-01-01 RX ADMIN — Medication 12.5 MILLIGRAM(S): at 12:29

## 2020-01-01 RX ADMIN — Medication 250 MILLIGRAM(S): at 06:17

## 2020-01-01 RX ADMIN — Medication 650 MILLIGRAM(S): at 03:13

## 2020-01-01 RX ADMIN — SACUBITRIL AND VALSARTAN 1 TABLET(S): 24; 26 TABLET, FILM COATED ORAL at 05:45

## 2020-01-01 RX ADMIN — LIDOCAINE 1 PATCH: 4 CREAM TOPICAL at 08:41

## 2020-01-01 RX ADMIN — AMIODARONE HYDROCHLORIDE 400 MILLIGRAM(S): 400 TABLET ORAL at 05:06

## 2020-01-01 RX ADMIN — ATORVASTATIN CALCIUM 20 MILLIGRAM(S): 80 TABLET, FILM COATED ORAL at 21:23

## 2020-01-01 RX ADMIN — Medication 650 MILLIGRAM(S): at 23:15

## 2020-01-01 RX ADMIN — CHLORHEXIDINE GLUCONATE 1 APPLICATION(S): 213 SOLUTION TOPICAL at 07:02

## 2020-01-01 RX ADMIN — NYSTATIN CREAM 1 APPLICATION(S): 100000 CREAM TOPICAL at 05:56

## 2020-01-01 RX ADMIN — Medication 5 MILLIGRAM(S): at 22:08

## 2020-01-01 RX ADMIN — HEPARIN SODIUM 5000 UNIT(S): 5000 INJECTION INTRAVENOUS; SUBCUTANEOUS at 05:06

## 2020-01-01 RX ADMIN — NYSTATIN CREAM 1 APPLICATION(S): 100000 CREAM TOPICAL at 06:21

## 2020-01-01 RX ADMIN — Medication 20 MILLIGRAM(S): at 17:45

## 2020-01-01 RX ADMIN — HEPARIN SODIUM 5000 UNIT(S): 5000 INJECTION INTRAVENOUS; SUBCUTANEOUS at 05:57

## 2020-01-01 RX ADMIN — TRAMADOL HYDROCHLORIDE 50 MILLIGRAM(S): 50 TABLET ORAL at 18:43

## 2020-01-01 RX ADMIN — SODIUM CHLORIDE 1000 MILLILITER(S): 9 INJECTION, SOLUTION INTRAVENOUS at 08:52

## 2020-01-01 RX ADMIN — SENNA PLUS 2 TABLET(S): 8.6 TABLET ORAL at 20:32

## 2020-01-01 RX ADMIN — BUPROPION HYDROCHLORIDE 150 MILLIGRAM(S): 150 TABLET, EXTENDED RELEASE ORAL at 11:53

## 2020-01-01 RX ADMIN — CHLORHEXIDINE GLUCONATE 1 APPLICATION(S): 213 SOLUTION TOPICAL at 05:47

## 2020-01-01 RX ADMIN — LISINOPRIL 5 MILLIGRAM(S): 2.5 TABLET ORAL at 10:11

## 2020-01-01 RX ADMIN — LIDOCAINE 1 PATCH: 4 CREAM TOPICAL at 11:33

## 2020-01-01 RX ADMIN — Medication 5 MILLIGRAM(S): at 21:29

## 2020-01-01 RX ADMIN — BUPROPION HYDROCHLORIDE 150 MILLIGRAM(S): 150 TABLET, EXTENDED RELEASE ORAL at 08:56

## 2020-01-01 RX ADMIN — AMIODARONE HYDROCHLORIDE 400 MILLIGRAM(S): 400 TABLET ORAL at 05:45

## 2020-01-01 RX ADMIN — Medication 250 MILLIGRAM(S): at 05:54

## 2020-01-01 RX ADMIN — Medication 650 MILLIGRAM(S): at 11:51

## 2020-01-01 RX ADMIN — Medication 12.5 MILLIGRAM(S): at 18:12

## 2020-01-01 RX ADMIN — MIDODRINE HYDROCHLORIDE 15 MILLIGRAM(S): 2.5 TABLET ORAL at 12:59

## 2020-01-01 RX ADMIN — EPINEPHRINE 146 MICROGRAM(S)/KG/MIN: 0.3 INJECTION INTRAMUSCULAR; SUBCUTANEOUS at 06:53

## 2020-01-01 RX ADMIN — Medication 50 GRAM(S): at 09:40

## 2020-01-01 RX ADMIN — Medication 5 MILLIGRAM(S): at 21:19

## 2020-01-01 RX ADMIN — Medication 3 MILLILITER(S): at 02:10

## 2020-01-01 RX ADMIN — MORPHINE SULFATE 2 MILLIGRAM(S): 50 CAPSULE, EXTENDED RELEASE ORAL at 12:44

## 2020-01-01 RX ADMIN — Medication 9.14 MICROGRAM(S)/KG/MIN: at 01:49

## 2020-01-01 RX ADMIN — Medication 250 MILLIGRAM(S): at 05:25

## 2020-01-01 RX ADMIN — MIDODRINE HYDROCHLORIDE 15 MILLIGRAM(S): 2.5 TABLET ORAL at 11:33

## 2020-01-01 RX ADMIN — TAMSULOSIN HYDROCHLORIDE 0.4 MILLIGRAM(S): 0.4 CAPSULE ORAL at 20:32

## 2020-01-01 RX ADMIN — AMIODARONE HYDROCHLORIDE 200 MILLIGRAM(S): 400 TABLET ORAL at 16:39

## 2020-01-01 RX ADMIN — AMIODARONE HYDROCHLORIDE 400 MILLIGRAM(S): 400 TABLET ORAL at 17:04

## 2020-01-01 RX ADMIN — AMIODARONE HYDROCHLORIDE 16.7 MG/MIN: 400 TABLET ORAL at 05:24

## 2020-01-01 RX ADMIN — ENOXAPARIN SODIUM 40 MILLIGRAM(S): 100 INJECTION SUBCUTANEOUS at 12:52

## 2020-01-01 RX ADMIN — TRAMADOL HYDROCHLORIDE 25 MILLIGRAM(S): 50 TABLET ORAL at 09:01

## 2020-01-01 RX ADMIN — MIDODRINE HYDROCHLORIDE 15 MILLIGRAM(S): 2.5 TABLET ORAL at 10:41

## 2020-01-01 RX ADMIN — TAMSULOSIN HYDROCHLORIDE 0.4 MILLIGRAM(S): 0.4 CAPSULE ORAL at 23:02

## 2020-01-01 RX ADMIN — NYSTATIN CREAM 1 APPLICATION(S): 100000 CREAM TOPICAL at 02:54

## 2020-01-01 RX ADMIN — Medication 62.5 MILLIMOLE(S): at 12:23

## 2020-01-01 RX ADMIN — Medication 100 MILLIEQUIVALENT(S): at 07:47

## 2020-01-01 RX ADMIN — AMIODARONE HYDROCHLORIDE 400 MILLIGRAM(S): 400 TABLET ORAL at 05:41

## 2020-01-01 RX ADMIN — CHLORHEXIDINE GLUCONATE 1 APPLICATION(S): 213 SOLUTION TOPICAL at 05:56

## 2020-01-01 RX ADMIN — NYSTATIN CREAM 1 APPLICATION(S): 100000 CREAM TOPICAL at 05:24

## 2020-01-01 RX ADMIN — MORPHINE SULFATE 2 MILLIGRAM(S): 50 CAPSULE, EXTENDED RELEASE ORAL at 18:18

## 2020-01-01 RX ADMIN — Medication 5 MILLIGRAM(S): at 21:23

## 2020-01-01 RX ADMIN — MIDODRINE HYDROCHLORIDE 15 MILLIGRAM(S): 2.5 TABLET ORAL at 03:22

## 2020-01-01 RX ADMIN — MIDODRINE HYDROCHLORIDE 10 MILLIGRAM(S): 2.5 TABLET ORAL at 22:09

## 2020-01-01 RX ADMIN — Medication 650 MILLIGRAM(S): at 06:24

## 2020-01-01 RX ADMIN — BUPROPION HYDROCHLORIDE 150 MILLIGRAM(S): 150 TABLET, EXTENDED RELEASE ORAL at 21:20

## 2020-01-01 RX ADMIN — ADENOSINE 6 MILLIGRAM(S): 3 INJECTION INTRAVENOUS at 17:15

## 2020-01-01 RX ADMIN — Medication 250 MILLIGRAM(S): at 18:18

## 2020-01-01 RX ADMIN — LIDOCAINE 1 PATCH: 4 CREAM TOPICAL at 12:28

## 2020-01-01 RX ADMIN — TAMSULOSIN HYDROCHLORIDE 0.4 MILLIGRAM(S): 0.4 CAPSULE ORAL at 23:07

## 2020-01-01 RX ADMIN — Medication 5 MILLIGRAM(S): at 22:10

## 2020-01-01 RX ADMIN — CHLORHEXIDINE GLUCONATE 1 APPLICATION(S): 213 SOLUTION TOPICAL at 05:11

## 2020-01-01 RX ADMIN — EPINEPHRINE 146 MICROGRAM(S)/KG/MIN: 0.3 INJECTION INTRAMUSCULAR; SUBCUTANEOUS at 01:45

## 2020-01-01 RX ADMIN — MEXILETINE HYDROCHLORIDE 200 MILLIGRAM(S): 150 CAPSULE ORAL at 08:27

## 2020-01-01 RX ADMIN — ALBUTEROL 2.5 MILLIGRAM(S): 90 AEROSOL, METERED ORAL at 18:22

## 2020-01-01 RX ADMIN — MAGNESIUM OXIDE 400 MG ORAL TABLET 400 MILLIGRAM(S): 241.3 TABLET ORAL at 12:23

## 2020-01-01 RX ADMIN — CLOPIDOGREL BISULFATE 75 MILLIGRAM(S): 75 TABLET, FILM COATED ORAL at 09:40

## 2020-01-01 RX ADMIN — TRAMADOL HYDROCHLORIDE 50 MILLIGRAM(S): 50 TABLET ORAL at 10:11

## 2020-01-01 RX ADMIN — MIDODRINE HYDROCHLORIDE 15 MILLIGRAM(S): 2.5 TABLET ORAL at 18:06

## 2020-01-01 RX ADMIN — TRAMADOL HYDROCHLORIDE 50 MILLIGRAM(S): 50 TABLET ORAL at 04:14

## 2020-01-01 RX ADMIN — TAMSULOSIN HYDROCHLORIDE 0.4 MILLIGRAM(S): 0.4 CAPSULE ORAL at 22:12

## 2020-01-01 RX ADMIN — ATORVASTATIN CALCIUM 20 MILLIGRAM(S): 80 TABLET, FILM COATED ORAL at 22:12

## 2020-01-01 RX ADMIN — MIDODRINE HYDROCHLORIDE 10 MILLIGRAM(S): 2.5 TABLET ORAL at 04:51

## 2020-01-01 RX ADMIN — HEPARIN SODIUM 5000 UNIT(S): 5000 INJECTION INTRAVENOUS; SUBCUTANEOUS at 21:12

## 2020-01-01 RX ADMIN — Medication 9.14 MICROGRAM(S)/KG/MIN: at 23:01

## 2020-01-01 RX ADMIN — Medication 40 MILLIEQUIVALENT(S): at 09:39

## 2020-01-01 RX ADMIN — Medication 50 MILLIGRAM(S): at 22:12

## 2020-01-01 RX ADMIN — BUPROPION HYDROCHLORIDE 150 MILLIGRAM(S): 150 TABLET, EXTENDED RELEASE ORAL at 09:40

## 2020-01-01 RX ADMIN — SODIUM CHLORIDE 1000 MILLILITER(S): 9 INJECTION, SOLUTION INTRAVENOUS at 02:04

## 2020-01-01 RX ADMIN — TRAMADOL HYDROCHLORIDE 50 MILLIGRAM(S): 50 TABLET ORAL at 02:13

## 2020-01-01 RX ADMIN — Medication 9.14 MICROGRAM(S)/KG/MIN: at 21:38

## 2020-01-01 RX ADMIN — NYSTATIN CREAM 1 APPLICATION(S): 100000 CREAM TOPICAL at 18:06

## 2020-01-01 RX ADMIN — NYSTATIN CREAM 1 APPLICATION(S): 100000 CREAM TOPICAL at 05:33

## 2020-01-01 RX ADMIN — BUPROPION HYDROCHLORIDE 150 MILLIGRAM(S): 150 TABLET, EXTENDED RELEASE ORAL at 17:33

## 2020-01-01 RX ADMIN — Medication 1 MILLILITER(S): at 05:43

## 2020-01-01 RX ADMIN — PIPERACILLIN AND TAZOBACTAM 25 GRAM(S): 4; .5 INJECTION, POWDER, LYOPHILIZED, FOR SOLUTION INTRAVENOUS at 05:54

## 2020-01-01 RX ADMIN — Medication 100 GRAM(S): at 08:10

## 2020-01-01 RX ADMIN — ATORVASTATIN CALCIUM 20 MILLIGRAM(S): 80 TABLET, FILM COATED ORAL at 21:33

## 2020-01-01 RX ADMIN — ATORVASTATIN CALCIUM 20 MILLIGRAM(S): 80 TABLET, FILM COATED ORAL at 21:29

## 2020-01-01 RX ADMIN — MIDODRINE HYDROCHLORIDE 20 MILLIGRAM(S): 2.5 TABLET ORAL at 15:28

## 2020-01-01 RX ADMIN — BUPROPION HYDROCHLORIDE 150 MILLIGRAM(S): 150 TABLET, EXTENDED RELEASE ORAL at 12:52

## 2020-01-01 RX ADMIN — Medication 100 MILLIEQUIVALENT(S): at 08:05

## 2020-01-01 RX ADMIN — FAMOTIDINE 20 MILLIGRAM(S): 10 INJECTION INTRAVENOUS at 21:19

## 2020-01-01 RX ADMIN — OLANZAPINE 5 MILLIGRAM(S): 15 TABLET, FILM COATED ORAL at 19:55

## 2020-01-01 RX ADMIN — TRAMADOL HYDROCHLORIDE 50 MILLIGRAM(S): 50 TABLET ORAL at 11:30

## 2020-01-01 RX ADMIN — PIPERACILLIN AND TAZOBACTAM 25 GRAM(S): 4; .5 INJECTION, POWDER, LYOPHILIZED, FOR SOLUTION INTRAVENOUS at 22:10

## 2020-01-01 RX ADMIN — TRAMADOL HYDROCHLORIDE 50 MILLIGRAM(S): 50 TABLET ORAL at 10:48

## 2020-01-01 RX ADMIN — PANTOPRAZOLE SODIUM 40 MILLIGRAM(S): 20 TABLET, DELAYED RELEASE ORAL at 11:21

## 2020-01-01 RX ADMIN — PIPERACILLIN AND TAZOBACTAM 25 GRAM(S): 4; .5 INJECTION, POWDER, LYOPHILIZED, FOR SOLUTION INTRAVENOUS at 06:39

## 2020-01-01 RX ADMIN — PIPERACILLIN AND TAZOBACTAM 25 GRAM(S): 4; .5 INJECTION, POWDER, LYOPHILIZED, FOR SOLUTION INTRAVENOUS at 14:43

## 2020-01-01 RX ADMIN — CLOPIDOGREL BISULFATE 75 MILLIGRAM(S): 75 TABLET, FILM COATED ORAL at 11:33

## 2020-01-01 RX ADMIN — EPINEPHRINE 146 MICROGRAM(S)/KG/MIN: 0.3 INJECTION INTRAMUSCULAR; SUBCUTANEOUS at 21:58

## 2020-01-01 RX ADMIN — HEPARIN SODIUM 5000 UNIT(S): 5000 INJECTION INTRAVENOUS; SUBCUTANEOUS at 05:46

## 2020-01-01 RX ADMIN — SODIUM CHLORIDE 60 MILLILITER(S): 9 INJECTION, SOLUTION INTRAVENOUS at 11:17

## 2020-01-01 RX ADMIN — Medication 81 MILLIGRAM(S): at 11:51

## 2020-01-01 RX ADMIN — APIXABAN 5 MILLIGRAM(S): 2.5 TABLET, FILM COATED ORAL at 05:57

## 2020-01-01 RX ADMIN — Medication 650 MILLIGRAM(S): at 08:56

## 2020-01-01 RX ADMIN — Medication 5 MILLIGRAM(S): at 21:03

## 2020-01-01 RX ADMIN — SACUBITRIL AND VALSARTAN 1 TABLET(S): 24; 26 TABLET, FILM COATED ORAL at 05:41

## 2020-01-01 RX ADMIN — HEPARIN SODIUM 5000 UNIT(S): 5000 INJECTION INTRAVENOUS; SUBCUTANEOUS at 23:02

## 2020-01-01 RX ADMIN — NYSTATIN CREAM 1 APPLICATION(S): 100000 CREAM TOPICAL at 06:17

## 2020-01-01 RX ADMIN — TRAMADOL HYDROCHLORIDE 50 MILLIGRAM(S): 50 TABLET ORAL at 17:01

## 2020-01-01 RX ADMIN — MORPHINE SULFATE 2 MILLIGRAM(S): 50 CAPSULE, EXTENDED RELEASE ORAL at 10:31

## 2020-01-01 RX ADMIN — CLOPIDOGREL BISULFATE 75 MILLIGRAM(S): 75 TABLET, FILM COATED ORAL at 11:16

## 2020-01-01 RX ADMIN — ONDANSETRON 4 MILLIGRAM(S): 8 TABLET, FILM COATED ORAL at 04:48

## 2020-01-01 RX ADMIN — Medication 50 GRAM(S): at 06:39

## 2020-01-01 RX ADMIN — Medication 9.14 MICROGRAM(S)/KG/MIN: at 09:50

## 2020-01-01 RX ADMIN — Medication 100 GRAM(S): at 01:45

## 2020-01-01 RX ADMIN — Medication 250 MILLIGRAM(S): at 17:34

## 2020-01-01 RX ADMIN — MAGNESIUM OXIDE 400 MG ORAL TABLET 400 MILLIGRAM(S): 241.3 TABLET ORAL at 18:08

## 2020-01-01 RX ADMIN — TRAMADOL HYDROCHLORIDE 50 MILLIGRAM(S): 50 TABLET ORAL at 13:30

## 2020-01-01 RX ADMIN — HEPARIN SODIUM 5000 UNIT(S): 5000 INJECTION INTRAVENOUS; SUBCUTANEOUS at 05:23

## 2020-01-01 RX ADMIN — MIDODRINE HYDROCHLORIDE 15 MILLIGRAM(S): 2.5 TABLET ORAL at 18:09

## 2020-01-01 RX ADMIN — Medication 81 MILLIGRAM(S): at 11:18

## 2020-01-01 RX ADMIN — MIDAZOLAM HYDROCHLORIDE 2 MILLIGRAM(S): 1 INJECTION, SOLUTION INTRAMUSCULAR; INTRAVENOUS at 14:00

## 2020-01-01 RX ADMIN — MIDODRINE HYDROCHLORIDE 10 MILLIGRAM(S): 2.5 TABLET ORAL at 20:04

## 2020-01-01 RX ADMIN — BUPROPION HYDROCHLORIDE 150 MILLIGRAM(S): 150 TABLET, EXTENDED RELEASE ORAL at 08:04

## 2020-01-01 RX ADMIN — Medication 5 MILLIGRAM(S): at 21:17

## 2020-01-01 RX ADMIN — Medication 40 MILLIEQUIVALENT(S): at 14:00

## 2020-01-01 RX ADMIN — SENNA PLUS 2 TABLET(S): 8.6 TABLET ORAL at 22:12

## 2020-01-01 RX ADMIN — ALBUTEROL 2.5 MILLIGRAM(S): 90 AEROSOL, METERED ORAL at 05:43

## 2020-01-01 RX ADMIN — HEPARIN SODIUM 5000 UNIT(S): 5000 INJECTION INTRAVENOUS; SUBCUTANEOUS at 11:03

## 2020-01-01 RX ADMIN — CHLORHEXIDINE GLUCONATE 15 MILLILITER(S): 213 SOLUTION TOPICAL at 05:44

## 2020-01-01 RX ADMIN — PROPOFOL 11.9 MICROGRAM(S)/KG/MIN: 10 INJECTION, EMULSION INTRAVENOUS at 01:32

## 2020-01-01 RX ADMIN — SACUBITRIL AND VALSARTAN 1 TABLET(S): 24; 26 TABLET, FILM COATED ORAL at 16:47

## 2020-01-01 RX ADMIN — CHLORHEXIDINE GLUCONATE 1 APPLICATION(S): 213 SOLUTION TOPICAL at 05:25

## 2020-01-01 RX ADMIN — AMIODARONE HYDROCHLORIDE 618 MILLIGRAM(S): 400 TABLET ORAL at 17:36

## 2020-01-01 RX ADMIN — MORPHINE SULFATE 2 MILLIGRAM(S): 50 CAPSULE, EXTENDED RELEASE ORAL at 12:19

## 2020-01-01 RX ADMIN — EPINEPHRINE 146 MICROGRAM(S)/KG/MIN: 0.3 INJECTION INTRAMUSCULAR; SUBCUTANEOUS at 10:32

## 2020-01-01 RX ADMIN — EPINEPHRINE 146 MICROGRAM(S)/KG/MIN: 0.3 INJECTION INTRAMUSCULAR; SUBCUTANEOUS at 05:55

## 2020-01-01 RX ADMIN — EPINEPHRINE 146 MICROGRAM(S)/KG/MIN: 0.3 INJECTION INTRAMUSCULAR; SUBCUTANEOUS at 08:08

## 2020-01-01 RX ADMIN — Medication 50 MILLILITER(S): at 12:03

## 2020-01-01 RX ADMIN — ATORVASTATIN CALCIUM 20 MILLIGRAM(S): 80 TABLET, FILM COATED ORAL at 21:21

## 2020-01-01 RX ADMIN — ATORVASTATIN CALCIUM 20 MILLIGRAM(S): 80 TABLET, FILM COATED ORAL at 21:17

## 2020-01-01 RX ADMIN — Medication 5 MILLIGRAM(S): at 22:12

## 2020-01-01 RX ADMIN — CLOPIDOGREL BISULFATE 75 MILLIGRAM(S): 75 TABLET, FILM COATED ORAL at 11:30

## 2020-01-01 RX ADMIN — Medication 15 MG/MIN: at 23:29

## 2020-01-01 RX ADMIN — MORPHINE SULFATE 2 MILLIGRAM(S): 50 CAPSULE, EXTENDED RELEASE ORAL at 00:15

## 2020-01-01 RX ADMIN — MORPHINE SULFATE 2 MILLIGRAM(S): 50 CAPSULE, EXTENDED RELEASE ORAL at 08:00

## 2020-01-01 RX ADMIN — AMIODARONE HYDROCHLORIDE 33.3 MG/MIN: 400 TABLET ORAL at 23:12

## 2020-01-01 RX ADMIN — Medication 50 GRAM(S): at 06:24

## 2020-01-01 RX ADMIN — Medication 40 MILLIGRAM(S): at 22:12

## 2020-01-01 RX ADMIN — TRAMADOL HYDROCHLORIDE 50 MILLIGRAM(S): 50 TABLET ORAL at 00:41

## 2020-01-01 RX ADMIN — NYSTATIN CREAM 1 APPLICATION(S): 100000 CREAM TOPICAL at 05:12

## 2020-01-01 RX ADMIN — Medication 9.14 MICROGRAM(S)/KG/MIN: at 07:09

## 2020-01-01 RX ADMIN — CHLORHEXIDINE GLUCONATE 1 APPLICATION(S): 213 SOLUTION TOPICAL at 04:14

## 2020-01-01 RX ADMIN — SACUBITRIL AND VALSARTAN 1 TABLET(S): 24; 26 TABLET, FILM COATED ORAL at 17:04

## 2020-01-01 RX ADMIN — Medication 650 MILLIGRAM(S): at 10:09

## 2020-01-01 RX ADMIN — Medication 15 MG/MIN: at 02:30

## 2020-01-01 RX ADMIN — Medication 250 MEQ/KG/HR: at 03:42

## 2020-01-01 RX ADMIN — PIPERACILLIN AND TAZOBACTAM 25 GRAM(S): 4; .5 INJECTION, POWDER, LYOPHILIZED, FOR SOLUTION INTRAVENOUS at 22:13

## 2020-01-01 RX ADMIN — NYSTATIN CREAM 1 APPLICATION(S): 100000 CREAM TOPICAL at 17:39

## 2020-01-01 RX ADMIN — ADENOSINE 12 MILLIGRAM(S): 3 INJECTION INTRAVENOUS at 17:17

## 2020-01-01 RX ADMIN — NYSTATIN CREAM 1 APPLICATION(S): 100000 CREAM TOPICAL at 06:36

## 2020-01-01 RX ADMIN — MIDODRINE HYDROCHLORIDE 15 MILLIGRAM(S): 2.5 TABLET ORAL at 18:12

## 2020-01-01 RX ADMIN — PIPERACILLIN AND TAZOBACTAM 200 GRAM(S): 4; .5 INJECTION, POWDER, LYOPHILIZED, FOR SOLUTION INTRAVENOUS at 23:57

## 2020-01-01 RX ADMIN — SODIUM CHLORIDE 100 MILLILITER(S): 9 INJECTION INTRAMUSCULAR; INTRAVENOUS; SUBCUTANEOUS at 21:47

## 2020-01-01 RX ADMIN — Medication 650 MILLIGRAM(S): at 14:04

## 2020-01-01 RX ADMIN — NYSTATIN CREAM 1 APPLICATION(S): 100000 CREAM TOPICAL at 19:55

## 2020-01-01 RX ADMIN — BUPROPION HYDROCHLORIDE 150 MILLIGRAM(S): 150 TABLET, EXTENDED RELEASE ORAL at 08:41

## 2020-01-01 RX ADMIN — MORPHINE SULFATE 2 MILLIGRAM(S): 50 CAPSULE, EXTENDED RELEASE ORAL at 15:29

## 2020-01-01 RX ADMIN — TRAMADOL HYDROCHLORIDE 50 MILLIGRAM(S): 50 TABLET ORAL at 22:12

## 2020-01-01 RX ADMIN — CHLORHEXIDINE GLUCONATE 1 APPLICATION(S): 213 SOLUTION TOPICAL at 05:57

## 2020-01-01 RX ADMIN — Medication 7.5 MG/MIN: at 12:04

## 2020-01-01 RX ADMIN — HEPARIN SODIUM 5000 UNIT(S): 5000 INJECTION INTRAVENOUS; SUBCUTANEOUS at 13:44

## 2020-01-01 RX ADMIN — SODIUM CHLORIDE 75 MILLILITER(S): 9 INJECTION, SOLUTION INTRAVENOUS at 11:29

## 2020-01-01 RX ADMIN — NYSTATIN CREAM 1 APPLICATION(S): 100000 CREAM TOPICAL at 17:21

## 2020-01-01 RX ADMIN — TRAMADOL HYDROCHLORIDE 50 MILLIGRAM(S): 50 TABLET ORAL at 06:32

## 2020-01-01 RX ADMIN — MAGNESIUM OXIDE 400 MG ORAL TABLET 400 MILLIGRAM(S): 241.3 TABLET ORAL at 08:14

## 2020-01-01 RX ADMIN — EPINEPHRINE 146 MICROGRAM(S)/KG/MIN: 0.3 INJECTION INTRAMUSCULAR; SUBCUTANEOUS at 04:16

## 2020-01-01 RX ADMIN — MIDODRINE HYDROCHLORIDE 15 MILLIGRAM(S): 2.5 TABLET ORAL at 11:27

## 2020-01-01 RX ADMIN — MORPHINE SULFATE 2 MILLIGRAM(S): 50 CAPSULE, EXTENDED RELEASE ORAL at 07:36

## 2020-01-01 RX ADMIN — MIDODRINE HYDROCHLORIDE 10 MILLIGRAM(S): 2.5 TABLET ORAL at 13:21

## 2020-01-01 RX ADMIN — HEPARIN SODIUM 5000 UNIT(S): 5000 INJECTION INTRAVENOUS; SUBCUTANEOUS at 05:33

## 2020-01-01 RX ADMIN — TRAMADOL HYDROCHLORIDE 50 MILLIGRAM(S): 50 TABLET ORAL at 21:50

## 2020-01-01 RX ADMIN — Medication 5 MILLIGRAM(S): at 20:32

## 2020-01-01 RX ADMIN — TRAMADOL HYDROCHLORIDE 50 MILLIGRAM(S): 50 TABLET ORAL at 16:39

## 2020-01-01 RX ADMIN — NYSTATIN CREAM 1 APPLICATION(S): 100000 CREAM TOPICAL at 18:10

## 2020-01-01 RX ADMIN — Medication 15 MG/MIN: at 16:39

## 2020-01-01 RX ADMIN — Medication 62.5 MILLIMOLE(S): at 10:01

## 2020-01-01 RX ADMIN — SODIUM CHLORIDE 1000 MILLILITER(S): 9 INJECTION INTRAMUSCULAR; INTRAVENOUS; SUBCUTANEOUS at 17:36

## 2020-01-01 RX ADMIN — HEPARIN SODIUM 5000 UNIT(S): 5000 INJECTION INTRAVENOUS; SUBCUTANEOUS at 21:33

## 2020-01-01 RX ADMIN — MIDODRINE HYDROCHLORIDE 15 MILLIGRAM(S): 2.5 TABLET ORAL at 06:17

## 2020-01-01 RX ADMIN — NYSTATIN CREAM 1 APPLICATION(S): 100000 CREAM TOPICAL at 05:44

## 2020-01-01 RX ADMIN — PIPERACILLIN AND TAZOBACTAM 25 GRAM(S): 4; .5 INJECTION, POWDER, LYOPHILIZED, FOR SOLUTION INTRAVENOUS at 13:39

## 2020-01-01 RX ADMIN — Medication 100 GRAM(S): at 23:23

## 2020-01-01 RX ADMIN — TRAMADOL HYDROCHLORIDE 50 MILLIGRAM(S): 50 TABLET ORAL at 04:45

## 2020-01-01 RX ADMIN — NYSTATIN CREAM 1 APPLICATION(S): 100000 CREAM TOPICAL at 05:58

## 2020-01-01 RX ADMIN — PIPERACILLIN AND TAZOBACTAM 25 GRAM(S): 4; .5 INJECTION, POWDER, LYOPHILIZED, FOR SOLUTION INTRAVENOUS at 14:50

## 2020-01-01 RX ADMIN — MAGNESIUM OXIDE 400 MG ORAL TABLET 400 MILLIGRAM(S): 241.3 TABLET ORAL at 16:46

## 2020-01-01 RX ADMIN — CLOPIDOGREL BISULFATE 75 MILLIGRAM(S): 75 TABLET, FILM COATED ORAL at 12:52

## 2020-01-01 RX ADMIN — PIPERACILLIN AND TAZOBACTAM 25 GRAM(S): 4; .5 INJECTION, POWDER, LYOPHILIZED, FOR SOLUTION INTRAVENOUS at 05:24

## 2020-01-01 RX ADMIN — Medication 1 MILLILITER(S): at 08:38

## 2020-01-01 RX ADMIN — NYSTATIN CREAM 1 APPLICATION(S): 100000 CREAM TOPICAL at 17:09

## 2020-01-01 RX ADMIN — MAGNESIUM OXIDE 400 MG ORAL TABLET 400 MILLIGRAM(S): 241.3 TABLET ORAL at 17:34

## 2020-01-01 RX ADMIN — MAGNESIUM OXIDE 400 MG ORAL TABLET 400 MILLIGRAM(S): 241.3 TABLET ORAL at 11:33

## 2020-01-01 RX ADMIN — CHLORHEXIDINE GLUCONATE 1 APPLICATION(S): 213 SOLUTION TOPICAL at 06:23

## 2020-01-01 RX ADMIN — NYSTATIN CREAM 1 APPLICATION(S): 100000 CREAM TOPICAL at 17:47

## 2020-01-01 RX ADMIN — Medication 5 MILLIGRAM(S): at 21:13

## 2020-01-01 RX ADMIN — Medication 81 MILLIGRAM(S): at 12:52

## 2020-01-01 RX ADMIN — ATORVASTATIN CALCIUM 20 MILLIGRAM(S): 80 TABLET, FILM COATED ORAL at 22:08

## 2020-01-01 RX ADMIN — Medication 1 MILLILITER(S): at 02:09

## 2020-01-01 RX ADMIN — AZITHROMYCIN 255 MILLIGRAM(S): 500 TABLET, FILM COATED ORAL at 21:48

## 2020-01-01 RX ADMIN — Medication 100 MILLIEQUIVALENT(S): at 09:59

## 2020-01-01 RX ADMIN — AMIODARONE HYDROCHLORIDE 618 MILLIGRAM(S): 400 TABLET ORAL at 22:18

## 2020-01-01 RX ADMIN — PANTOPRAZOLE SODIUM 40 MILLIGRAM(S): 20 TABLET, DELAYED RELEASE ORAL at 12:27

## 2020-01-01 RX ADMIN — AMIODARONE HYDROCHLORIDE 400 MILLIGRAM(S): 400 TABLET ORAL at 10:39

## 2020-01-01 RX ADMIN — Medication 3 MILLILITER(S): at 08:38

## 2020-01-01 RX ADMIN — CLOPIDOGREL BISULFATE 75 MILLIGRAM(S): 75 TABLET, FILM COATED ORAL at 11:56

## 2020-01-01 RX ADMIN — Medication 5 MILLIGRAM(S): at 04:56

## 2020-01-01 RX ADMIN — MIDODRINE HYDROCHLORIDE 15 MILLIGRAM(S): 2.5 TABLET ORAL at 18:45

## 2020-01-01 RX ADMIN — Medication 40 MILLIEQUIVALENT(S): at 09:45

## 2020-01-01 RX ADMIN — Medication 50 GRAM(S): at 05:57

## 2020-01-01 RX ADMIN — Medication 81 MILLIGRAM(S): at 09:40

## 2020-01-01 RX ADMIN — TAMSULOSIN HYDROCHLORIDE 0.4 MILLIGRAM(S): 0.4 CAPSULE ORAL at 22:08

## 2020-01-01 RX ADMIN — Medication 22.5 MG/MIN: at 22:45

## 2020-01-01 RX ADMIN — SODIUM CHLORIDE 1000 MILLILITER(S): 9 INJECTION INTRAMUSCULAR; INTRAVENOUS; SUBCUTANEOUS at 18:53

## 2020-01-01 RX ADMIN — MORPHINE SULFATE 2 MILLIGRAM(S): 50 CAPSULE, EXTENDED RELEASE ORAL at 01:25

## 2020-01-01 RX ADMIN — SODIUM POLYSTYRENE SULFONATE 30 GRAM(S): 4.1 POWDER, FOR SUSPENSION ORAL at 23:23

## 2020-01-01 RX ADMIN — NYSTATIN CREAM 1 APPLICATION(S): 100000 CREAM TOPICAL at 18:08

## 2020-01-01 RX ADMIN — TAMSULOSIN HYDROCHLORIDE 0.4 MILLIGRAM(S): 0.4 CAPSULE ORAL at 21:23

## 2020-01-01 RX ADMIN — ADENOSINE 12 MILLIGRAM(S): 3 INJECTION INTRAVENOUS at 17:19

## 2020-01-01 RX ADMIN — ATORVASTATIN CALCIUM 20 MILLIGRAM(S): 80 TABLET, FILM COATED ORAL at 21:12

## 2020-01-01 RX ADMIN — TRAMADOL HYDROCHLORIDE 50 MILLIGRAM(S): 50 TABLET ORAL at 05:32

## 2020-01-01 RX ADMIN — ATORVASTATIN CALCIUM 20 MILLIGRAM(S): 80 TABLET, FILM COATED ORAL at 21:03

## 2020-01-01 RX ADMIN — PIPERACILLIN AND TAZOBACTAM 25 GRAM(S): 4; .5 INJECTION, POWDER, LYOPHILIZED, FOR SOLUTION INTRAVENOUS at 13:26

## 2020-01-01 RX ADMIN — PIPERACILLIN AND TAZOBACTAM 25 GRAM(S): 4; .5 INJECTION, POWDER, LYOPHILIZED, FOR SOLUTION INTRAVENOUS at 22:12

## 2020-01-01 RX ADMIN — PIPERACILLIN AND TAZOBACTAM 25 GRAM(S): 4; .5 INJECTION, POWDER, LYOPHILIZED, FOR SOLUTION INTRAVENOUS at 22:07

## 2020-01-01 RX ADMIN — ENOXAPARIN SODIUM 40 MILLIGRAM(S): 100 INJECTION SUBCUTANEOUS at 11:33

## 2020-01-01 RX ADMIN — PIPERACILLIN AND TAZOBACTAM 25 GRAM(S): 4; .5 INJECTION, POWDER, LYOPHILIZED, FOR SOLUTION INTRAVENOUS at 06:20

## 2020-01-01 RX ADMIN — HEPARIN SODIUM 5000 UNIT(S): 5000 INJECTION INTRAVENOUS; SUBCUTANEOUS at 21:54

## 2020-01-01 RX ADMIN — DEXMEDETOMIDINE HYDROCHLORIDE IN 0.9% SODIUM CHLORIDE 12.4 MICROGRAM(S)/KG/HR: 4 INJECTION INTRAVENOUS at 17:22

## 2020-01-01 RX ADMIN — DEXMEDETOMIDINE HYDROCHLORIDE IN 0.9% SODIUM CHLORIDE 12.4 MICROGRAM(S)/KG/HR: 4 INJECTION INTRAVENOUS at 23:29

## 2020-01-01 RX ADMIN — Medication 250 MILLIGRAM(S): at 17:47

## 2020-01-01 RX ADMIN — MIDAZOLAM HYDROCHLORIDE 2 MILLIGRAM(S): 1 INJECTION, SOLUTION INTRAMUSCULAR; INTRAVENOUS at 04:56

## 2020-01-01 RX ADMIN — ATORVASTATIN CALCIUM 20 MILLIGRAM(S): 80 TABLET, FILM COATED ORAL at 20:32

## 2020-01-01 RX ADMIN — Medication 81 MILLIGRAM(S): at 12:27

## 2020-01-01 RX ADMIN — SENNA PLUS 2 TABLET(S): 8.6 TABLET ORAL at 22:08

## 2020-01-01 RX ADMIN — SODIUM CHLORIDE 1000 MILLILITER(S): 9 INJECTION INTRAMUSCULAR; INTRAVENOUS; SUBCUTANEOUS at 22:30

## 2020-01-01 RX ADMIN — NYSTATIN CREAM 1 APPLICATION(S): 100000 CREAM TOPICAL at 05:57

## 2020-01-01 RX ADMIN — HEPARIN SODIUM 5000 UNIT(S): 5000 INJECTION INTRAVENOUS; SUBCUTANEOUS at 15:15

## 2020-01-01 RX ADMIN — ATORVASTATIN CALCIUM 20 MILLIGRAM(S): 80 TABLET, FILM COATED ORAL at 23:02

## 2020-01-01 RX ADMIN — TRAMADOL HYDROCHLORIDE 50 MILLIGRAM(S): 50 TABLET ORAL at 05:15

## 2020-01-01 RX ADMIN — Medication 650 MILLIGRAM(S): at 22:15

## 2020-01-01 RX ADMIN — SACUBITRIL AND VALSARTAN 1 TABLET(S): 24; 26 TABLET, FILM COATED ORAL at 17:05

## 2020-01-01 RX ADMIN — MIDODRINE HYDROCHLORIDE 15 MILLIGRAM(S): 2.5 TABLET ORAL at 03:04

## 2020-01-01 RX ADMIN — PIPERACILLIN AND TAZOBACTAM 25 GRAM(S): 4; .5 INJECTION, POWDER, LYOPHILIZED, FOR SOLUTION INTRAVENOUS at 14:55

## 2020-01-01 RX ADMIN — NYSTATIN CREAM 1 APPLICATION(S): 100000 CREAM TOPICAL at 18:19

## 2020-01-01 RX ADMIN — Medication 650 MILLIGRAM(S): at 02:02

## 2020-01-01 RX ADMIN — Medication 12.5 MILLIGRAM(S): at 05:11

## 2020-01-01 RX ADMIN — BUPROPION HYDROCHLORIDE 150 MILLIGRAM(S): 150 TABLET, EXTENDED RELEASE ORAL at 11:33

## 2020-01-01 RX ADMIN — HEPARIN SODIUM 5000 UNIT(S): 5000 INJECTION INTRAVENOUS; SUBCUTANEOUS at 05:41

## 2020-01-01 RX ADMIN — Medication 50 GRAM(S): at 07:47

## 2020-01-01 RX ADMIN — ATORVASTATIN CALCIUM 20 MILLIGRAM(S): 80 TABLET, FILM COATED ORAL at 21:54

## 2020-01-01 RX ADMIN — AMIODARONE HYDROCHLORIDE 150 MILLIGRAM(S): 400 TABLET ORAL at 23:13

## 2020-01-01 RX ADMIN — OXYCODONE HYDROCHLORIDE 5 MILLIGRAM(S): 5 TABLET ORAL at 21:33

## 2020-01-01 RX ADMIN — Medication 50 GRAM(S): at 08:16

## 2020-01-01 RX ADMIN — Medication 40 MILLIEQUIVALENT(S): at 08:14

## 2020-01-01 RX ADMIN — CHLORHEXIDINE GLUCONATE 1 APPLICATION(S): 213 SOLUTION TOPICAL at 05:21

## 2020-01-01 RX ADMIN — CHLORHEXIDINE GLUCONATE 15 MILLILITER(S): 213 SOLUTION TOPICAL at 05:43

## 2020-01-01 RX ADMIN — SODIUM CHLORIDE 500 MILLILITER(S): 9 INJECTION INTRAMUSCULAR; INTRAVENOUS; SUBCUTANEOUS at 23:13

## 2020-01-01 RX ADMIN — MIDODRINE HYDROCHLORIDE 10 MILLIGRAM(S): 2.5 TABLET ORAL at 05:43

## 2020-01-01 RX ADMIN — Medication 50 GRAM(S): at 09:33

## 2020-01-01 RX ADMIN — Medication 100 GRAM(S): at 23:48

## 2020-01-01 RX ADMIN — CLOPIDOGREL BISULFATE 75 MILLIGRAM(S): 75 TABLET, FILM COATED ORAL at 08:03

## 2020-01-01 RX ADMIN — Medication 250 MILLIGRAM(S): at 05:57

## 2020-01-01 RX ADMIN — TAMSULOSIN HYDROCHLORIDE 0.4 MILLIGRAM(S): 0.4 CAPSULE ORAL at 20:42

## 2020-01-01 RX ADMIN — TRAMADOL HYDROCHLORIDE 50 MILLIGRAM(S): 50 TABLET ORAL at 12:54

## 2020-01-01 RX ADMIN — Medication 9.28 MICROGRAM(S)/KG/MIN: at 04:54

## 2020-01-01 RX ADMIN — Medication 3 MILLILITER(S): at 20:43

## 2020-01-01 RX ADMIN — ROBINUL 0.2 MILLIGRAM(S): 0.2 INJECTION INTRAMUSCULAR; INTRAVENOUS at 13:33

## 2020-01-01 RX ADMIN — Medication 9.14 MICROGRAM(S)/KG/MIN: at 11:18

## 2020-01-01 RX ADMIN — Medication 7.5 MG/MIN: at 00:52

## 2020-01-01 RX ADMIN — BUPROPION HYDROCHLORIDE 150 MILLIGRAM(S): 150 TABLET, EXTENDED RELEASE ORAL at 09:59

## 2020-01-01 RX ADMIN — CEFTRIAXONE 100 MILLIGRAM(S): 500 INJECTION, POWDER, FOR SOLUTION INTRAMUSCULAR; INTRAVENOUS at 21:47

## 2020-01-01 RX ADMIN — MIDODRINE HYDROCHLORIDE 15 MILLIGRAM(S): 2.5 TABLET ORAL at 02:14

## 2020-01-01 RX ADMIN — Medication 250 MILLIGRAM(S): at 18:06

## 2020-01-01 RX ADMIN — Medication 9.28 MICROGRAM(S)/KG/MIN: at 21:03

## 2020-01-01 RX ADMIN — Medication 250 MILLIGRAM(S): at 22:25

## 2020-01-01 RX ADMIN — SACUBITRIL AND VALSARTAN 1 TABLET(S): 24; 26 TABLET, FILM COATED ORAL at 05:06

## 2020-01-01 RX ADMIN — AMIODARONE HYDROCHLORIDE 150 MILLIGRAM(S): 400 TABLET ORAL at 17:46

## 2020-01-01 RX ADMIN — LISINOPRIL 5 MILLIGRAM(S): 2.5 TABLET ORAL at 05:23

## 2020-01-01 RX ADMIN — CHLORHEXIDINE GLUCONATE 15 MILLILITER(S): 213 SOLUTION TOPICAL at 20:14

## 2020-01-01 RX ADMIN — Medication 650 MILLIGRAM(S): at 21:01

## 2020-01-01 RX ADMIN — BUPROPION HYDROCHLORIDE 150 MILLIGRAM(S): 150 TABLET, EXTENDED RELEASE ORAL at 11:03

## 2020-01-01 RX ADMIN — SENNA PLUS 2 TABLET(S): 8.6 TABLET ORAL at 21:57

## 2020-01-01 RX ADMIN — Medication 81 MILLIGRAM(S): at 08:56

## 2020-01-01 RX ADMIN — Medication 250 MILLIGRAM(S): at 05:10

## 2020-01-01 RX ADMIN — Medication 20 MILLIEQUIVALENT(S): at 06:23

## 2020-01-01 RX ADMIN — SENNA PLUS 2 TABLET(S): 8.6 TABLET ORAL at 22:10

## 2020-01-01 RX ADMIN — Medication 9.14 MICROGRAM(S)/KG/MIN: at 03:06

## 2020-01-01 RX ADMIN — Medication 9.14 MICROGRAM(S)/KG/MIN: at 06:02

## 2020-01-01 RX ADMIN — MIDODRINE HYDROCHLORIDE 15 MILLIGRAM(S): 2.5 TABLET ORAL at 05:54

## 2020-01-01 RX ADMIN — TRAMADOL HYDROCHLORIDE 50 MILLIGRAM(S): 50 TABLET ORAL at 06:21

## 2020-01-01 RX ADMIN — Medication 81 MILLIGRAM(S): at 08:03

## 2020-01-01 RX ADMIN — ATORVASTATIN CALCIUM 20 MILLIGRAM(S): 80 TABLET, FILM COATED ORAL at 23:07

## 2020-01-01 RX ADMIN — Medication 250 MILLIGRAM(S): at 18:09

## 2020-01-01 RX ADMIN — AMIODARONE HYDROCHLORIDE 200 MILLIGRAM(S): 400 TABLET ORAL at 05:33

## 2020-01-01 RX ADMIN — CLOPIDOGREL BISULFATE 75 MILLIGRAM(S): 75 TABLET, FILM COATED ORAL at 10:39

## 2020-01-01 RX ADMIN — Medication 250 MEQ/KG/HR: at 20:37

## 2020-01-01 RX ADMIN — PIPERACILLIN AND TAZOBACTAM 25 GRAM(S): 4; .5 INJECTION, POWDER, LYOPHILIZED, FOR SOLUTION INTRAVENOUS at 07:03

## 2020-01-01 RX ADMIN — Medication 250 MILLIGRAM(S): at 06:20

## 2020-01-01 RX ADMIN — MIDODRINE HYDROCHLORIDE 15 MILLIGRAM(S): 2.5 TABLET ORAL at 11:16

## 2020-01-01 RX ADMIN — TRAMADOL HYDROCHLORIDE 50 MILLIGRAM(S): 50 TABLET ORAL at 17:08

## 2020-01-01 RX ADMIN — Medication 20 MILLIGRAM(S): at 13:20

## 2020-01-01 RX ADMIN — MAGNESIUM OXIDE 400 MG ORAL TABLET 400 MILLIGRAM(S): 241.3 TABLET ORAL at 09:39

## 2020-01-01 RX ADMIN — PIPERACILLIN AND TAZOBACTAM 25 GRAM(S): 4; .5 INJECTION, POWDER, LYOPHILIZED, FOR SOLUTION INTRAVENOUS at 23:07

## 2020-01-01 RX ADMIN — MAGNESIUM OXIDE 400 MG ORAL TABLET 400 MILLIGRAM(S): 241.3 TABLET ORAL at 09:01

## 2020-01-01 RX ADMIN — Medication 50 MILLIGRAM(S): at 22:10

## 2020-01-01 RX ADMIN — Medication 81 MILLIGRAM(S): at 11:03

## 2020-01-01 RX ADMIN — APIXABAN 5 MILLIGRAM(S): 2.5 TABLET, FILM COATED ORAL at 05:10

## 2020-01-01 RX ADMIN — Medication 12.5 MILLIGRAM(S): at 05:57

## 2020-01-01 RX ADMIN — CLOPIDOGREL BISULFATE 75 MILLIGRAM(S): 75 TABLET, FILM COATED ORAL at 08:56

## 2020-01-01 RX ADMIN — SODIUM CHLORIDE 500 MILLILITER(S): 9 INJECTION, SOLUTION INTRAVENOUS at 09:33

## 2020-01-01 RX ADMIN — ATORVASTATIN CALCIUM 20 MILLIGRAM(S): 80 TABLET, FILM COATED ORAL at 20:42

## 2020-06-19 NOTE — ED ADULT TRIAGE NOTE - CHIEF COMPLAINT QUOTE
Pt arrives in SVT rate in the 180's with c/o "lightheaded and short of breath". Pt placed in CC and Dr Patel called to eval. EMS administered 6mg Adenosine and then 12mg of Adenosine followed by 20mg Cardizem.

## 2020-06-19 NOTE — ED ADULT NURSE NOTE - OBJECTIVE STATEMENT
Pt arrives with a rapid HR in the 180's 190's and c/o SOB, light headed, and is speaking in short sentences. Pt placed on CM and lifepak pads and Dr. Patel called to bedside. EMS had administered 6 and then 12mg of adenosine that were unsuccessful. Pt denies chest pain.

## 2020-06-19 NOTE — ED PROVIDER NOTE - PROGRESS NOTE DETAILS
adenosine given again without improvement; cardioversion @ 50J applied which resolve tachyarrythmia HR=60s, patient has no complaints adenosine and amiodarone bolus given without improvement; cardioversion @ 50J applied which resolve tachyarrythmia

## 2020-06-19 NOTE — ED ADULT NURSE REASSESSMENT NOTE - NS ED NURSE REASSESS COMMENT FT1
Received pt from critical care.  Assumed care of pt from Shelly LOCKHART.  Pt resting on stretcher, awaiting cardiology.  Denies chest pain or SOB, states he feels much better after cardioversion. Will continue to monitor

## 2020-06-19 NOTE — ED PROVIDER NOTE - CRITICAL CARE PROVIDED
consult w/ pt's family directly relating to pts condition/additional history taking/direct patient care (not related to procedure)/consultation with other physicians/interpretation of diagnostic studies/documentation

## 2020-06-19 NOTE — ED PROVIDER NOTE - CONSTITUTIONAL, MLM
normal... Well appearing, awake, alert, oriented to person, place, time/situation and in mild distress. Awake, alert, oriented to person, place, time/situation and in mild distress.

## 2020-06-19 NOTE — CONSULT NOTE ADULT - SUBJECTIVE AND OBJECTIVE BOX
[facility], THE HE[facility]                                   [address] [place] [address]                                                      PHONE: [phone]                                                         FAX: [phone]  [email or web address]  ---------------------------------------------------------------------------------------------------------------------------------    Reason for Consult: SVT    HPI:  SASKIA ELIAS is an 79y Male with remote CVA, HLD, HTN, poor functional status and wheelchair bound due to ? arthritis presented this afternoon with severe chest burning since yesterday afternoon. He says the symptoms started yesterday and persisted throughout the night and by this afternoon he couldn't take it anymore and love he came to the hospital. The chest burning was severe 9/10 and associated with SOB and lightheadedness. There were no alleviating or exacerbating factors. He has never had symptoms like this before. In the ED he was found with wide complex regular tachycardia 190 bpm. He was given Adenosine 6 mg and then 12 mg and Amiodarone 150 mg bolus with no response and then synchronized cardioversion was performed with successful conversion to normal sinus rhythm.     PAST MEDICAL & SURGICAL HISTORY:  Stroke  Osteoarthritis  Hyperlipidemia  Depression  HTN (hypertension)  S/P stroke due to cerebrovascular disease      No Known Allergies      MEDICATIONS  (STANDING):    MEDICATIONS  (PRN):      Social History:  Cigarettes: never      Alcohol:   never              Illicit Drug Abuse:  never    Family History:  He denies any significant family history of CAD, MI, CVA, or sudden cardiac death.     ROS: Negative other than as mentioned in HPI.    Vital Signs Last 24 Hrs  T(C): --  T(F): --  HR: 60 (19 Jun 2020 19:00) (60 - 189)  BP: 103/59 (19 Jun 2020 19:00) (103/59 - 126/78)  BP(mean): --  RR: 24 (19 Jun 2020 17:22) (24 - 26)  SpO2: 99% (19 Jun 2020 17:22) (99% - 99%)  ICU Vital Signs Last 24 Hrs  SASKIA ELIAS  I&O's Detail    I&O's Summary    Drug Dosing Weight  SASKIA ELIAS      PHYSICAL EXAM:  General: Appears well developed, well nourished alert and cooperative.  HEENT: Head; normocephalic, atraumatic.  Eyes: Pupils reactive, cornea wnl.  Neck: Supple, no nodes adenopathy, no NVD or carotid bruit or thyromegaly.  CARDIOVASCULAR: Normal S1 and S2, No murmur, rub, gallop or lift.   LUNGS: No rales, rhonchi or wheeze. Normal breath sounds bilaterally.  ABDOMEN: Soft, nontender without mass or organomegaly. bowel sounds normoactive.  EXTREMITIES: No clubbing, cyanosis or edema. Distal pulses wnl.   SKIN: warm and dry with normal turgor.  NEURO: Alert/oriented x 3/normal motor exam. No pathologic reflexes.    PSYCH: normal affect.        LABS:                        12.6   11.42 )-----------( 220      ( 19 Jun 2020 17:37 )             37.8     06-19    133<L>  |  96<L>  |  18.0  ----------------------------<  119<H>  4.9   |  24.0  |  1.51<H>    Ca    10.0      19 Jun 2020 17:37  Phos  3.2     06-19  Mg     1.9     06-19    TPro  6.0<L>  /  Alb  3.7  /  TBili  0.9  /  DBili  x   /  AST  28  /  ALT  8   /  AlkPhos  73  06-19    SASKIA ELIAS  CARDIAC MARKERS ( 19 Jun 2020 17:37 )  x     / 0.19 ng/mL / 206 U/L / x     / 35.5 ng/mL      PT/INR - ( 19 Jun 2020 17:37 )   PT: 13.3 sec;   INR: 1.17 ratio         PTT - ( 19 Jun 2020 17:37 )  PTT:34.5 sec      RADIOLOGY & ADDITIONAL STUDIES:    INTERPRETATION OF TELEMETRY (personally reviewed): currently sinus rhythm HR 50s    ECG  17:08: wide complex regular tachycardia 193 bpm, LBBB morphology  ECG 17:22: sinus rhythm 61 bpm, LBBB, nonspecific ST abnormality     Assessment and Plan:  In summary, SASKIA ELIAS is an 79y Male with past medical history significant for remote CVA, HLD, HTN, poor functional status and wheelchair bound due to ? arthritis presented this afternoon with chest pain, SOB, and near syncope. In the ED he was found with wide complex regular tachycardia 190 bpm. He was given Adenosine 6 mg and then 12 mg and Amiodarone 150 mg bolus with no response and then synchronized cardioversion was performed with successful conversion to normal sinus rhythm.     SVT, NSTEMI -- the rhythm on presentation was likely SVT with aberrancy, as the underlying QRS morphology while the patient is in sinus rhythm is a similar LBBB. The patient is currently in sinus rhythm in the 50s and feels much better. First troponin is mildly elevated 0.19 and likely demand ischemia from tachycardia.  - start Lopressor 12.5 mg BID  - check TTE   - gentle IV hydration  - admit to telemetry  - trend troponin until peak and repeat EKG    Will follow closely with you.

## 2020-06-19 NOTE — ED PROVIDER NOTE - CARE PLAN
Principal Discharge DX:	Tachyarrhythmia Principal Discharge DX:	SVT (supraventricular tachycardia)  Secondary Diagnosis:	Tachyarrhythmia

## 2020-06-20 PROBLEM — I63.9 CEREBRAL INFARCTION, UNSPECIFIED: Chronic | Status: ACTIVE | Noted: 2019-07-06

## 2020-06-20 PROBLEM — M19.90 UNSPECIFIED OSTEOARTHRITIS, UNSPECIFIED SITE: Chronic | Status: ACTIVE | Noted: 2019-07-06

## 2020-06-20 PROBLEM — F32.9 MAJOR DEPRESSIVE DISORDER, SINGLE EPISODE, UNSPECIFIED: Chronic | Status: ACTIVE | Noted: 2019-07-06

## 2020-06-20 PROBLEM — E78.5 HYPERLIPIDEMIA, UNSPECIFIED: Chronic | Status: ACTIVE | Noted: 2019-07-06

## 2020-06-20 NOTE — PROGRESS NOTE ADULT - ASSESSMENT
78 y/o M with PMHX CVA with hemiparesis, HLD, HTN, Osteoarthritis, cardiomyopathy LVEF unknown, poor functional status who is wheelchair bound (non-ambulatory baseline) c/o acute onset chest pain since yesterday afternoon BIBEMS noted to be in wide complex tachycardia in 190 bpm, refractory to adenosine, cardizem and amiodarone boluses s/p emergent synchronized cardioversion with successful conversion to NSR. TTE with severe LVEF <20% without hemodynamically significant valve stenosis or regurgitation.      Wide Complex tachycardia/cardiomyopathy with severely reduced LVEF, chronicity unknown   - VT vs SVT with aberrancy. The patient is currently in sinus rhythm   - change lopressor to metoprolol XL 25mg given decreased cardiac function  - start Entresto 24-26mg   - start amiodarone loadinmg BID x5 days followed by 200mg BID   - continue ASA and statin therapy   - goal K>4 and Mg>2  - BNP 4576  - likely for EPS as outpatient pending results of [facility]  - Lifevest on d/c given severely LVEF reduction     NSTEMI type 2  -likely demand ischemia from rates >190 on EKG  -trend Cardiac enzymes until peak - plan for [facility] on monday  -c/w metoprolol, ASA, statin    Leukocytosis - resolved  -elevated on admission likely reactive  -no acute s/s of infection  -cont to monitor    CARLA  -Possibly decrased perfusion from SVT/CHF. Unclear baseline.   -on admission sCr 1.51, most recent downtrending  -will start entresto given pt's low cardiac output - monitor sCr  -EF <20% - use IVF with caution    Depression/mood disorder  pt takes wellbutrin 150mg XR BID - will start with wellbutrin 150mg XR daily, uptirate if necessary  medication confirmed with pharmacy    Hx CVA, OA, HLD  -c/w ASA and Plavix  -c/w Statin  -tylenol for Pain control  -PT consult after C    Nonambulatory Baseline  -wheelchair bound. 2/2 "Botched" Hip Replacement and OA    DVT PPX: heparin    Advance Care Directive: Full code     Disposition: home Pending [facility]. Attempted to update sister Maite who lives in [place]. Number given by pt [phone] is NOT working. Awaiting a new phone number. 80 y/o M with PMHX CVA with hemiparesis, HLD, HTN, Osteoarthritis, cardiomyopathy LVEF unknown, poor functional status who is wheelchair bound (non-ambulatory baseline) c/o acute onset chest pain since yesterday afternoon BIBEMS noted to be in wide complex tachycardia in 190 bpm, refractory to adenosine, cardizem and amiodarone boluses s/p emergent synchronized cardioversion with successful conversion to NSR. TTE with severe LVEF <20% without hemodynamically significant valve stenosis or regurgitation.      Wide Complex tachycardia/cardiomyopathy with severely reduced LVEF, chronicity unknown   - VT vs SVT with aberrancy. The patient is currently in sinus rhythm   - change lopressor to metoprolol XL 25mg given decreased cardiac function  - start Entresto 24-26mg   - start amiodarone loadinmg BID x5 days followed by 200mg BID   - continue ASA and statin therapy   - goal K>4 and Mg>2  - BNP 4576  - likely for EPS as outpatient pending results of [facility]  - Lifevest on d/c given severely LVEF reduction     NSTEMI type 2  -likely demand ischemia from rates >190 on EKG  -trend Cardiac enzymes until peak - plan for [facility] on monday  -c/w metoprolol, ASA, statin    Leukocytosis - resolved  -elevated on admission likely reactive  -no acute s/s of infection  -cont to monitor    CARLA  -Possibly decrased perfusion from SVT/CHF. Unclear baseline.   -on admission sCr 1.51, most recent downtrending  -will start entresto given pt's low cardiac output - monitor sCr  -EF <20% - use IVF with caution    Elevated AST  -incidental finding, normal on admission  -anicteric, no RUQ pain  -trend AST    Depression/mood disorder  pt takes wellbutrin 150mg XR BID - will start with wellbutrin 150mg XR daily, uptirate if necessary  medication confirmed with pharmacy    Hx CVA, OA, HLD  -c/w ASA and Plavix  -c/w Statin  -tylenol for Pain control  -PT consult after [facility]    Nonambulatory Baseline  -wheelchair bound. 2/2 "Botched" Hip Replacement and OA    DVT PPX: heparin    Advance Care Directive: Full code     Disposition: home Pending [facility]. Attempted to update sister Maite who lives in [place]. Number given by pt [phone] is NOT working. Awaiting a new phone number.

## 2020-06-20 NOTE — H&P ADULT - NSICDXPASTMEDICALHX_GEN_ALL_CORE_FT
PAST MEDICAL HISTORY:  Depression     HTN (hypertension)     Hyperlipidemia     Osteoarthritis     Stroke

## 2020-06-20 NOTE — H&P ADULT - ASSESSMENT
ECG  17:08: wide complex regular tachycardia 193 bpm, LBBB morphology  ECG 17:22: sinus rhythm 61 bpm, LBBB, nonspecific ST abnormality   2dEcho with LVEF <20% with diastolic dysfunction and dilated Aortic Root.     A/P:  Wide Complex Tachycardia/SVT with Abberancy/LBBB  -Admit to Medicine  -Monitor Telemetry  -Repeat labs   -replace elctrolytes  -s/p 1L IVF in ER hold additional IVF with CHF  -s/p Adenosine 6mg and 12mg and Cardizem 20mg IVP x1 by EMS  -s/p Adenosine 6mg, 12mg, 12mg and Amiodarone 150mg IVP in ER  -post-cardioversion EKG Sinus Rhythm LBBB   -Repeat EKG in AM  -unclear whether LBBB new or not  -2dEcho with new CHFrEF LVEF <20% with diastolic dysfunction and a dilated aortic root  -Metoprolol Tartrate 12.5mg PO q12  -CXR with some PVC consider diuresis in AM   -check TSH  -VTE PPX  -NPO for now  -Cardiology Consult    NSTEMI  -likely demand ischemia from rates >190 on EKG  -labs drawn prior to cardioversion  -trend Cardiac Enzymes  -ASA  -plan as above    New Onset CHFrEF  -plan as above. Cardiology consult.    Leukocytosis  -likely reactive. Trend CBC.     CARLA  -Possibly decrased perfusion from SVT/CHF. Unclear baseline.   -repeat BMP/trend Cr  -Avoid Nephrotoxins  -s/p 1L IVFB NSS in ER  -hold furhter IVF with LVEF <20%    Hx CVA, OA, HLD  -Patient unable to remember all meds, doesnt take antidepressants anymore  -restart ASA and Plavix  -restart Statin  -Please perform Med Rec in AM with [facility] in [place] for complete med rec    Nonambulatory Baseline  -wheelchair bound. 2/2 "Botched" Hip Replacement

## 2020-06-20 NOTE — PROGRESS NOTE ADULT - SUBJECTIVE AND OBJECTIVE BOX
Patient is a 79y old  Male who presents with a chief complaint of SVT/Wide Complex Tachycardia, LBBB, new onset CHFrEF, NSTEMI (20 Jun 2020 08:34)    INTERVAL HPI/OVERNIGHT EVENTS:  Patient seen and examined at bedside. Patient this AM has no acute complaints. Patient states he feels much better. Patient reports good appetite    ROS: Denies CP, SOB, BRITO, abd pain, nausea/vomiting, diarrhea/constipation, fever chills, LE pain.    Monitor: NSR    Vital Signs Last 24 Hrs  T(C): 36.6 (20 Jun 2020 11:04), Max: 36.9 (20 Jun 2020 04:57)  T(F): 97.9 (20 Jun 2020 11:04), Max: 98.4 (20 Jun 2020 04:57)  HR: 56 (20 Jun 2020 11:04) (51 - 189)  BP: 91/48 (20 Jun 2020 11:04) (91/48 - 126/78)  RR: 18 (20 Jun 2020 11:04) (18 - 26)  SpO2: 95% (20 Jun 2020 11:04) (93% - 99%)    Physical Exam  GEN - appears age appropriate. well nourished. pleasant. no distress.   HEENT - NCAT, EOMI, MAMI  RESP - good air entry, +scattered rales. not on supplemental O2.  CARDIO - NS1S2, RRR. No murmurs  GI - Soft/Non tender/Non distended. Normal BS x4 quadrants.   Ext - No DENG. Lt Medial malleolus - chronic wound, no gross discharge noted  MSK - Rt > LE weakness. ROM LE difficult to participate due to pain  Neuro - cn 2-12 grossly intact.  Skin - no rashes or lesions.    Psych- AAOx4. no suicidal/homicidal ideation. appropriate behaviour. attentive. normal affect.    LABS:                        12.6   9.86  )-----------( 182      ( 20 Jun 2020 06:40 )             38.7     06-20    132<L>  |  96<L>  |  19.0  ----------------------------<  87  4.8   |  24.0  |  1.10    Ca    9.6      20 Jun 2020 06:40  Phos  2.6     06-20  Mg     2.0     06-20    TPro  6.5<L>  /  Alb  3.7  /  TBili  1.1  /  DBili  x   /  AST  53<H>  /  ALT  11  /  AlkPhos  81  06-20    PT/INR - ( 20 Jun 2020 06:40 )   PT: 13.5 sec;   INR: 1.19 ratio         PTT - ( 19 Jun 2020 17:37 )  PTT:34.5 sec      RADIOLOGY & ADDITIONAL TESTS:  < from: TTE Echo Complete w/o Contrast w/ Doppler (06.19.20 @ 22:21) >  Summary:   1. Left ventricular ejection fraction, by visual estimation, is <20%.   2. Severely decreased global left ventricular systolic function.   3. Spectral Dopplershows impaired relaxation pattern of left ventricular myocardial filling (Grade I diastolic dysfunction).   4. Mild mitral annular calcification.   5. The mitral valve leaflets are tethered which is due to reduced systolic function and elevated LVDP.   6. Sclerotic aortic valve with normal opening.   7. Dilated Ao root at 4.1cm. Dilated Asc Ao at 4.4cm.   8. Endocardial visualization was enhanced with intravenous echo contrast.    < end of copied text >    < from: Xray Chest 1 View-PORTABLE IMMEDIATE (06.19.20 @ 18:44) >  The lungs demonstrate soft tissue fullness in the right hilar and right paratracheal region unchanged from prior examination. The costophrenicangles are not imaged on the film limiting evaluation. The lung apices are partially obscured by patient's overlying jewelry limiting evaluation. The heart and mediastinum appear intact.      IMPRESSION: Limited study. Stable soft tissue fullness noted in the right hilar and right paratracheal region.    < end of copied text >    ECG  17:08: wide complex regular tachycardia 193 bpm, LBBB morphology  ECG 17:22: sinus rhythm 61 bpm, LBBB, nonspecific ST abnormality    MEDICATIONS  (STANDING):  aMIOdarone    Tablet 400 milliGRAM(s) Oral every 12 hours  aspirin  chewable 81 milliGRAM(s) Oral daily  atorvastatin 20 milliGRAM(s) Oral at bedtime  clopidogrel Tablet 75 milliGRAM(s) Oral daily  heparin   Injectable 5000 Unit(s) SubCutaneous every 8 hours  metoprolol succinate ER 25 milliGRAM(s) Oral daily  sacubitril 24 mG/valsartan 26 mG 1 Tablet(s) Oral two times a day

## 2020-06-20 NOTE — PROGRESS NOTE ADULT - SUBJECTIVE AND OBJECTIVE BOX
[facility]ly [facility], THE HEART CENTER                                   [address] [place] [address]                                                      PHONE: [phone]                                                         FAX: [phone]  [email or web address]  ---------------------------------------------------------------------------------------------------------------------------------    Overnight events/patient complaints: s/p emergent cardioversion for wide complex tachycardia     INTERPRETATION OF TELEMETRY (personally reviewed):    ECHO: < from: TTE Echo Complete w/o Contrast w/ Doppler (20 @ 22:21) >   1. Left ventricular ejection fraction, by visual estimation, is <20%.   2. Severely decreased global left ventricular systolic function.   3. Spectral Dopplershows impaired relaxation pattern of left ventricular myocardial filling (Grade I diastolic dysfunction).   4. Mild mitral annular calcification.   5. The mitral valve leaflets are tethered which is due to reduced systolic function and elevated LVDP.   6. Sclerotic aortic valve with normal opening.   7. Dilated Ao root at 4.1cm. Dilated Asc Ao at 4.4cm.   8. Endocardial visualization was enhanced with intravenous echo contrast.      CARDIAC CATHETERIZATION:    I&O's Summary      PAST MEDICAL & SURGICAL HISTORY:  Stroke  Osteoarthritis  Hyperlipidemia  Depression  HTN (hypertension)  S/P stroke due to cerebrovascular disease      No Known Allergies    MEDICATIONS  (STANDING):  aspirin  chewable 81 milliGRAM(s) Oral daily  atorvastatin 20 milliGRAM(s) Oral at bedtime  clopidogrel Tablet 75 milliGRAM(s) Oral daily  heparin   Injectable 5000 Unit(s) SubCutaneous every 8 hours  metoprolol tartrate 12.5 milliGRAM(s) Oral two times a day    MEDICATIONS  (PRN):      Vital Signs Last 24 Hrs  T(C): 36.8 (2020 07:31), Max: 36.9 (2020 04:57)  T(F): 98.2 (2020 07:31), Max: 98.4 (2020 04:57)  HR: 68 (2020 07:31) (51 - 189)  BP: 111/72 (2020 07:31) (103/59 - 126/78)  BP(mean): --  RR: 20 (2020 07:31) (20 - 26)  SpO2: 93% (2020 07:31) (93% - 99%)  ICU Vital Signs Last 24 Hrs    PHYSICAL EXAM:  General: Appears well developed, well nourished alert and cooperative.  HEENT: Head; normocephalic, atraumatic.Pupils reactive, cornea wnl. Neck supple, no nodes adenopathy, no JVD  CARDIOVASCULAR: Normal S1 and S2, 1/6 JERARDO, no rub, gallop or lift.   LUNGS: No rales, rhonchi or wheeze. Normal breath sounds bilaterally.  ABDOMEN: Soft, nontender without mass or organomegaly. bowel sounds normoactive.  EXTREMITIES: No clubbing, cyanosis or edema.   SKIN: warm and dry with normal turgor.  NEURO: Alert/oriented x 3/normal motor exam.   PSYCH: normal affect.        LABS:                        12.6   9.86  )-----------( 182      ( 2020 06:40 )             38.7     06-20    132<L>  |  96<L>  |  19.0  ----------------------------<  87  4.8   |  24.0  |  1.10    Ca    9.6      2020 06:40  Phos  2.6     06-20  Mg     2.0     06-20    TPro  6.5<L>  /  Alb  3.7  /  TBili  1.1  /  DBili  x   /  AST  53<H>  /  ALT  11  /  AlkPhos  81  06-20    SASKIA ELIAS  CARDIAC MARKERS ( 2020 06:40 )  x     / 0.57 ng/mL / x     / x     / x      CARDIAC MARKERS ( 2020 01:52 )  x     / 0.52 ng/mL / 348 U/L / x     / 61.8 ng/mL  CARDIAC MARKERS ( 2020 17:37 )  x     / 0.19 ng/mL / 206 U/L / x     / 35.5 ng/mL      PT/INR - ( 2020 06:40 )   PT: 13.5 sec;   INR: 1.19 ratio         PTT - ( 2020 17:37 )  PTT:34.5 sec      RADIOLOGY & ADDITIONAL STUDIES:      ASSESSMENT AND PLAN:  In summary, SASKIA ELIAS is a 79y Male with past medical history significant for remote CVA with hemiparesis with poor functional status who is wheelchair bound, reported cardiomyopathy LVEF unknown, CAD, unclear revascularization status, hypertension, and dyslipidemia who presented with severe chest burning found with wide complex regular tachycardia 190 bpm, refractory to adenosine and amiodarone boluses s/p emergent synchronized cardioversion with successful conversion to normal sinus rhythm. TTE with severe LVEF reduction (<20%) without hemodynamically significant valve stenosis or regurgitation.     ECG  17:08: wide complex regular tachycardia 193 bpm, LBBB morphology  ECG 17:22: sinus rhythm 61 bpm, LBBB, nonspecific ST abnormality     Wide Complex tachycardia/troponin elevation/cardiomyopathy with severely reduced LVEF, chronicity unknown   - VT vs SVT with aberrancy. The patient is currently in sinus rhythm   - given his severely reduced LVEF change to metoprolol XL 25mg, add Entresto 24-26mg   - start amiodarone loadinmg BID x5 days followed by 200mg BID   - continue ASA and statin therapy   - goal K>4 and Mg>2  - trend troponin until peak, will likely plan for LHC   - likely for EPS as outpatient pending results of LHC  - Lifevest on d/c given severely LVEF reduction     Further recommendations pending clinical course     Thank you for allowing [facility] to participate in the care of this patient.  Please feel free to call with any questions or concerns. [facility], THE HEART CENTER                                   [address] [place] [address]                                                      PHONE: [phone]                                                         FAX: [phone]  [email or web address]  ---------------------------------------------------------------------------------------------------------------------------------    Overnight events/patient complaints: s/p emergent cardioversion for wide complex tachycardia     INTERPRETATION OF TELEMETRY (personally reviewed):    ECHO: < from: TTE Echo Complete w/o Contrast w/ Doppler (20 @ 22:21) >   1. Left ventricular ejection fraction, by visual estimation, is <20%.   2. Severely decreased global left ventricular systolic function.   3. Spectral Dopplershows impaired relaxation pattern of left ventricular myocardial filling (Grade I diastolic dysfunction).   4. Mild mitral annular calcification.   5. The mitral valve leaflets are tethered which is due to reduced systolic function and elevated LVDP.   6. Sclerotic aortic valve with normal opening.   7. Dilated Ao root at 4.1cm. Dilated Asc Ao at 4.4cm.   8. Endocardial visualization was enhanced with intravenous echo contrast.      CARDIAC CATHETERIZATION:    I&O's Summary      PAST MEDICAL & SURGICAL HISTORY:  Stroke  Osteoarthritis  Hyperlipidemia  Depression  HTN (hypertension)  S/P stroke due to cerebrovascular disease      No Known Allergies    MEDICATIONS  (STANDING):  aspirin  chewable 81 milliGRAM(s) Oral daily  atorvastatin 20 milliGRAM(s) Oral at bedtime  clopidogrel Tablet 75 milliGRAM(s) Oral daily  heparin   Injectable 5000 Unit(s) SubCutaneous every 8 hours  metoprolol tartrate 12.5 milliGRAM(s) Oral two times a day    MEDICATIONS  (PRN):      Vital Signs Last 24 Hrs  T(C): 36.8 (2020 07:31), Max: 36.9 (2020 04:57)  T(F): 98.2 (2020 07:31), Max: 98.4 (2020 04:57)  HR: 68 (2020 07:31) (51 - 189)  BP: 111/72 (2020 07:31) (103/59 - 126/78)  BP(mean): --  RR: 20 (2020 07:31) (20 - 26)  SpO2: 93% (2020 07:31) (93% - 99%)  ICU Vital Signs Last 24 Hrs    PHYSICAL EXAM:  Given concern for COVID 19, examination deferred at this time and extrapolated from prior provider exam         LABS:                        12.6   9.86  )-----------( 182      ( 2020 06:40 )             38.7     06-20    132<L>  |  96<L>  |  19.0  ----------------------------<  87  4.8   |  24.0  |  1.10    Ca    9.6      2020 06:40  Phos  2.6     06-20  Mg     2.0     06-20    TPro  6.5<L>  /  Alb  3.7  /  TBili  1.1  /  DBili  x   /  AST  53<H>  /  ALT  11  /  AlkPhos  81  06-20    SASKIA ELIAS  CARDIAC MARKERS ( 2020 06:40 )  x     / 0.57 ng/mL / x     / x     / x      CARDIAC MARKERS ( 2020 01:52 )  x     / 0.52 ng/mL / 348 U/L / x     / 61.8 ng/mL  CARDIAC MARKERS ( 2020 17:37 )  x     / 0.19 ng/mL / 206 U/L / x     / 35.5 ng/mL      PT/INR - ( 2020 06:40 )   PT: 13.5 sec;   INR: 1.19 ratio         PTT - ( 2020 17:37 )  PTT:34.5 sec      RADIOLOGY & ADDITIONAL STUDIES:      ASSESSMENT AND PLAN:  In summary, SASKIA ELIAS is a 79y Male with past medical history significant for remote CVA with hemiparesis with poor functional status who is wheelchair bound, reported cardiomyopathy LVEF unknown, CAD, unclear revascularization status, hypertension, and dyslipidemia who presented with severe chest burning found with wide complex regular tachycardia 190 bpm, refractory to adenosine and amiodarone boluses s/p emergent synchronized cardioversion with successful conversion to normal sinus rhythm. TTE with severe LVEF reduction (<20%) without hemodynamically significant valve stenosis or regurgitation.     ECG  17:08: wide complex regular tachycardia 193 bpm, LBBB morphology  ECG 17:22: sinus rhythm 61 bpm, LBBB, nonspecific ST abnormality     Wide Complex tachycardia/troponin elevation/cardiomyopathy with severely reduced LVEF, chronicity unknown   - VT vs SVT with aberrancy. The patient is currently in sinus rhythm   - given his severely reduced LVEF change to metoprolol XL 25mg, add Entresto 24-26mg   - start amiodarone loadinmg BID x5 days followed by 200mg BID   - continue ASA and statin therapy   - goal K>4 and Mg>2  - trend troponin until peak, will likely plan for LHC   - likely for EPS as outpatient pending results of LHC  - Lifevest on d/c given severely LVEF reduction     Further recommendations pending clinical course     Thank you for allowing [facility] to participate in the care of this patient.  Please feel free to call with any questions or concerns.

## 2020-06-20 NOTE — H&P ADULT - HISTORY OF PRESENT ILLNESS
78 y/o M with PMHX CVA, HLD, HTN, Osteoarthritis, nonmabulatory baseline wheel-chair bound c/o acute onset chestpain since yesterday afternoon BIBEMS noted to be in SVT s/p Adenosine 6/12 and then Cardizem 20mg IVP without improvement. EKG in ER with wide ommplex tachycardia given additional 6/12/12 Adenosine and then Amiodarone load 150mg IVP x1. Subsequently emergently cardiuoverted successfully with repeat EKG demonstrating Sinus Rhythm with LBBB (unclear chronicity). Pt evaluated by Cardiology and started on BB. Echo shows new onset CHFrEF LVEF <20% with diastolic Dysfunction. Pt seen/examined in CDU. Denies current complaints. No prior similar episodes. No orthopnea or PND. Laying down flat without issue. No exacerbating/alleviating factors. Says he feels great although he does c/o chronic LE edema (though minimal present on exam at time of admission). Denies any other complaints. No cough, wheezing, CP, SOB, Fever/Chills, N/V/D, or abd pain. No urinary complaints. ROS Negative unless mentioned above.

## 2020-06-20 NOTE — H&P ADULT - NSHPPHYSICALEXAM_GEN_ALL_CORE
VS: HR 60s, BP stable, O2sat 100%, afebrile  Constitutional: well-appearing, NAD, VSS  Head: NC/AT  Eyes: PERRL, EOMI   ENT: Normal Pharynx, MMM  Neck: Supple, Non-tender  Chest: Non-tender, no rashes  Cardio: RRR, s1/s2, no appreciable murmurs/rubs/gallops  Resp: BS CTA bilaterally, no wheezing/rhonchi/rales  Abd: Soft, Non-tender, Non-distended, no rebound/guarding/rigidity  Vascular: palpable distal pulses, good capillary refill  Psych: appropriate, cooperative, pleasant  Neuro: moving all extremities, garbled speech (chronic), no other apparent focal deficits, nonambulatory baseline  MSK: moving all extremities, no motor weakness  Ext: no clubbing/cyanosis/edema  Skin: Warm/Dry. Mild redness to LE.

## 2020-06-21 NOTE — PROGRESS NOTE ADULT - SUBJECTIVE AND OBJECTIVE BOX
[facility], THE HEART CENTER                                   [address] [place] [address]                                                      PHONE: [phone]                                                         FAX: [phone]  [email or web address]  ---------------------------------------------------------------------------------------------------------------------------    Overnight events/patient complaints: No acute events. Reading at bedside     INTERPRETATION OF TELEMETRY (personally reviewed): intermittent ventricular arrhythmia     ECHO: < from: TTE Echo Complete w/o Contrast w/ Doppler (20 @ 22:21) >   1. Left ventricular ejection fraction, by visual estimation, is <20%.   2. Severely decreased global left ventricular systolic function.   3. Spectral Dopplershows impaired relaxation pattern of left ventricular myocardial filling (Grade I diastolic dysfunction).   4. Mild mitral annular calcification.   5. The mitral valve leaflets are tethered which is due to reduced systolic function and elevated LVDP.   6. Sclerotic aortic valve with normal opening.   7. Dilated Ao root at 4.1cm. Dilated Asc Ao at 4.4cm.   8. Endocardial visualization was enhanced with intravenous echo contrast.    PAST MEDICAL & SURGICAL HISTORY:  Stroke  Osteoarthritis  Hyperlipidemia  Depression  HTN (hypertension)  S/P stroke due to cerebrovascular disease    No Known Allergies    MEDICATIONS  (STANDING):  aMIOdarone    Tablet 400 milliGRAM(s) Oral every 12 hours  aspirin  chewable 81 milliGRAM(s) Oral daily  atorvastatin 20 milliGRAM(s) Oral at bedtime  buPROPion XL . 150 milliGRAM(s) Oral daily  clopidogrel Tablet 75 milliGRAM(s) Oral daily  heparin   Injectable 5000 Unit(s) SubCutaneous every 8 hours  magnesium oxide 400 milliGRAM(s) Oral three times a day with meals  metoprolol succinate ER 25 milliGRAM(s) Oral daily  sacubitril 24 mG/valsartan 26 mG 1 Tablet(s) Oral two times a day  sodium phosphate IVPB 15 milliMole(s) IV Intermittent once  tamsulosin 0.4 milliGRAM(s) Oral at bedtime    MEDICATIONS  (PRN):    Vital Signs Last 24 Hrs  T(C): 36.8 (2020 08:20), Max: 36.8 (2020 23:04)  T(F): 98.2 (2020 08:20), Max: 98.3 (2020 23:04)  HR: 63 (2020 08:20) (56 - 75)  BP: 121/73 (2020 08:20) (91/48 - 155/66)  BP(mean): --  RR: 18 (2020 08:20) (18 - 19)  SpO2: 95% (2020 08:20) (95% - 99%)  ICU Vital Signs Last 24 Hrs    PHYSICAL EXAM:  General: Appears well developed, well nourished alert and cooperative.  HEENT: Head; normocephalic, atraumatic.Pupils reactive, cornea wnl. Neck supple, no nodes adenopathy, no JVD  CARDIOVASCULAR: Normal S1 and S2, 1/6 JERARDO, no rub, gallop or lift.   LUNGS: No rales, rhonchi or wheeze. Normal breath sounds bilaterally.  ABDOMEN: Soft, nontender without mass or organomegaly. bowel sounds normoactive.  EXTREMITIES: No clubbing, cyanosis or edema.   SKIN: warm and dry with normal turgor.  NEURO: Alert/oriented x 3/ (+) hemiparesis   PSYCH: normal affect.        LABS:                        11.8   8.84  )-----------( 166      ( 2020 07:18 )             36.0     06-21    135  |  99  |  17.0  ----------------------------<  79  4.3   |  25.0  |  0.77    Ca    8.7      2020 07:18  Phos  2.3     06-21  Mg     1.7     06-21    TPro  x   /  Alb  x   /  TBili  x   /  DBili  x   /  AST  34  /  ALT  x   /  AlkPhos  x   -    SASKIA ELIAS  CARDIAC MARKERS ( 2020 07:46 )  x     / 1.11 ng/mL / x     / x     / x      CARDIAC MARKERS ( 2020 13:43 )  x     / 0.68 ng/mL / x     / x     / x      CARDIAC MARKERS ( 2020 06:40 )  x     / 0.57 ng/mL / x     / x     / x      CARDIAC MARKERS ( 2020 01:52 )  x     / 0.52 ng/mL / 348 U/L / x     / 61.8 ng/mL  CARDIAC MARKERS ( 2020 17:37 )  x     / 0.19 ng/mL / 206 U/L / x     / 35.5 ng/mL    PT/INR - ( 2020 06:40 )   PT: 13.5 sec;   INR: 1.19 ratio   PT - ( 2020 17:37 )  PTT:34.5 sec    Assessment and Plan   In summary, SASKIA ELIAS is a 79y Male with past medical history significant for remote CVA with hemiparesis with poor functional status who is wheelchair bound, reported cardiomyopathy LVEF unknown, CAD, unclear revascularization status, hypertension, and dyslipidemia who presented with severe chest burning found with wide complex regular tachycardia 190 bpm, refractory to adenosine and amiodarone boluses s/p emergent synchronized cardioversion with successful conversion to normal sinus rhythm. TTE with severe LVEF reduction (<20%) without hemodynamically significant valve stenosis or regurgitation.     ECG  17:08: wide complex regular tachycardia 193 bpm, LBBB morphology  ECG 17:22: sinus rhythm 61 bpm, LBBB, nonspecific ST abnormality     Wide Complex tachycardia/troponin elevation/cardiomyopathy with severely reduced LVEF, chronicity unknown   - VT vs SVT with aberrancy. The patient is currently in sinus rhythm   - given his severely reduced LVEF change to metoprolol XL 25mg, add Entresto 24-26mg   - start amiodarone loadinmg BID x5 days followed by 200mg BID   - continue ASA and statin therapy   - goal K>4 and Mg>2  - trend troponin until peak, continues to uptrend will likely plan for LHC   - likely for EPS as outpatient pending results of LHC  - Lifevest on d/c given severely LVEF reduction     Further recommendations pending clinical course. Discussed with resident on the team      Thank you for allowing [facility] to participate in the care of this patient.  Please feel free to call with any questions or concerns.

## 2020-06-21 NOTE — PROGRESS NOTE ADULT - ASSESSMENT
80 y/o M with PMHX CVA with hemiparesis, HLD, HTN, Osteoarthritis, cardiomyopathy LVEF unknown, poor functional status who is wheelchair bound (non-ambulatory baseline) c/o acute onset chest pain since yesterday afternoon BIBEMS noted to be in wide complex tachycardia in 190 bpm, refractory to adenosine, cardizem and amiodarone boluses s/p emergent synchronized cardioversion with successful conversion to NSR. TTE with severe LVEF <20% without hemodynamically significant valve stenosis or regurgitation.      Wide Complex tachycardia/cardiomyopathy with severely reduced LVEF, chronicity unknown   - VT vs SVT with aberrancy. The patient is currently in sinus rhythm   - c/w metoprolol XL 25mg   - c/w Entresto 24-26mg   - c/w amiodarone loadinmg BID x5 days followed by 200mg BID   - continue ASA and statin therapy   - goal K>4 and Mg>2  - BNP 4576  - likely for EPS as outpatient pending results of [facility]  - Lifevest on d/c given severely LVEF reduction   - NPO after midnight for [facility] tomorrow    NSTEMI type 2   - elevated troponins, have no peaked, will continue to trend  -likely demand ischemia from rates >190 on EKG  -trend Cardiac enzymes until peak - plan for [facility] on monday   -c/w metoprolol, ASA, statin    Leukocytosis - resolved  -elevated on admission likely reactive, afebrile  - cxr noted wit w/ possible infiltration, small infiltrate of right middle lobe, but pt asymptomatic  -cont to monitor    Urinary frequency likely 2/2 to BPH and poor bladder emptying  - pt complains of incomplete bladder emptying, and poor  - will bladder scan post void  - c/w flomax    CARLA  - resolved, Cr now 0.77  -Possibly decrased perfusion from SVT/CHF. Unclear baseline.   -on admission sCr 1.51  -c/w entresto given pt's low cardiac output - monitor sCr  -EF <20% - use IVF with caution    Elevated AST  -incidental finding, normal on admission  -anicteric, no RUQ pain    Depression/mood disorder  pt takes wellbutrin 150mg XR BID -   c/w with wellbutrin 150mg XR daily  medication confirmed with pharmacy    Hx CVA, OA, HLD  -c/w ASA and Plavix  -c/w Statin  -tylenol for Pain control  -PT consult after LHC    Nonambulatory Baseline  -wheelchair bound. 2/2 "Botched" Hip Replacement and OA    DVT PPX: heparin    [facility] Directive: Full code     Disposition: home Pending [facility], PT juliana. Attempted to update sister Maite who lives in [place]. Number given by pt [phone] is NOT working.

## 2020-06-21 NOTE — PROGRESS NOTE ADULT - SUBJECTIVE AND OBJECTIVE BOX
Patient is a 79y old  Male who presents with a chief complaint of SVT/Wide Complex Tachycardia, LBBB, new onset CHFrEF, NSTEMI (20 Jun 2020 08:34)    INTERVAL HPI/OVERNIGHT EVENTS:  Patient seen and examined at bedside. Patient this AM has no acute complaints. Patient states he feels much better. Patient reports good appetite    ROS: Denies CP, SOB, BRITO, abd pain, nausea/vomiting, diarrhea/constipation, fever chills, LE pain.    Monitor: SR w/ pvcs    Vital Signs Last 24 Hrs  T(C): 36.8 (21 Jun 2020 08:20), Max: 36.8 (20 Jun 2020 23:04)  T(F): 98.2 (21 Jun 2020 08:20), Max: 98.3 (20 Jun 2020 23:04)  HR: 63 (21 Jun 2020 08:20) (59 - 75)  BP: 121/73 (21 Jun 2020 08:20) (106/66 - 155/66)  BP(mean): --  RR: 18 (21 Jun 2020 08:20) (18 - 19)  SpO2: 95% (21 Jun 2020 08:20) (95% - 99%)    Physical Exam  GEN - appears age appropriate. well nourished. pleasant. no distress.   HEENT - NCAT, EOMI, MAMI  RESP - good air entry, +scattered rales. not on supplemental O2.  CARDIO - NS1S2, RRR. No murmurs  GI - Soft/Non tender/Non distended. Normal BS x4 quadrants.   Ext - No DENG. Lt Medial malleolus - chronic wound, no gross discharge noted  MSK - Rt > LE weakness. ROM LE difficult to participate due to pain  Neuro - cn 2-12 grossly intact.  Skin - no rashes or lesions.    Psych- AAOx4. no suicidal/homicidal ideation. appropriate behaviour. attentive. normal affect.    LABS:                          11.8   8.84  )-----------( 166      ( 21 Jun 2020 07:18 )             36.0     06-21    135  |  99  |  17.0  ----------------------------<  79  4.3   |  25.0  |  0.77    Ca    8.7      21 Jun 2020 07:18  Phos  2.3     06-21  Mg     1.7     06-21    TPro  x   /  Alb  x   /  TBili  x   /  DBili  x   /  AST  34  /  ALT  x   /  AlkPhos  x   06-21       RADIOLOGY & ADDITIONAL TESTS:  < from: TTE Echo Complete w/o Contrast w/ Doppler (06.19.20 @ 22:21) >  Summary:   1. Left ventricular ejection fraction, by visual estimation, is <20%.   2. Severely decreased global left ventricular systolic function.   3. Spectral Dopplershows impaired relaxation pattern of left ventricular myocardial filling (Grade I diastolic dysfunction).   4. Mild mitral annular calcification.   5. The mitral valve leaflets are tethered which is due to reduced systolic function and elevated LVDP.   6. Sclerotic aortic valve with normal opening.   7. Dilated Ao root at 4.1cm. Dilated Asc Ao at 4.4cm.   8. Endocardial visualization was enhanced with intravenous echo contrast.    < end of copied text >    < from: Xray Chest 1 View-PORTABLE IMMEDIATE (06.19.20 @ 18:44) >  The lungs demonstrate soft tissue fullness in the right hilar and right paratracheal region unchanged from prior examination. The costophrenicangles are not imaged on the film limiting evaluation. The lung apices are partially obscured by patient's overlying jewelry limiting evaluation. The heart and mediastinum appear intact.      IMPRESSION: Limited study. Stable soft tissue fullness noted in the right hilar and right paratracheal region.    < end of copied text >    ECG  17:08: wide complex regular tachycardia 193 bpm, LBBB morphology  ECG 17:22: sinus rhythm 61 bpm, LBBB, nonspecific ST abnormality    MEDICATIONS  (STANDING):  aMIOdarone    Tablet 400 milliGRAM(s) Oral every 12 hours  aspirin  chewable 81 milliGRAM(s) Oral daily  atorvastatin 20 milliGRAM(s) Oral at bedtime  clopidogrel Tablet 75 milliGRAM(s) Oral daily  heparin   Injectable 5000 Unit(s) SubCutaneous every 8 hours  metoprolol succinate ER 25 milliGRAM(s) Oral daily  sacubitril 24 mG/valsartan 26 mG 1 Tablet(s) Oral two times a day

## 2020-06-22 NOTE — PHYSICAL THERAPY INITIAL EVALUATION ADULT - ASSISTIVE DEVICE FOR TRANSFER: SIT/STAND, REHAB EVAL
2 trials, with and without RW, Pt more stable with RW however, states he has bedrails at home that he uses for stability.

## 2020-06-22 NOTE — PHYSICAL THERAPY INITIAL EVALUATION ADULT - PERTINENT HX OF CURRENT PROBLEM, REHAB EVAL
80 y/o M with PMHX CVA, HLD, HTN, Osteoarthritis, nonmabulatory baseline wheel-chair bound c/o acute onset chest pain. Abnormal EKG, unable to medicinally convert to Sinus rhythm, pt cardioverted successfully in ED. Pending Cardiac Cath.

## 2020-06-22 NOTE — PHYSICAL THERAPY INITIAL EVALUATION ADULT - CRITERIA FOR SKILLED THERAPEUTIC INTERVENTIONS
impairments found/Home, transfers only to w/c, RW recommended for safety/anticipated discharge recommendation

## 2020-06-22 NOTE — PHYSICAL THERAPY INITIAL EVALUATION ADULT - ADDITIONAL COMMENTS
Pt lives alone in a senior complex however has aide coverage for 4 hours in am and 4 hours in pm 5 days a week. Has elevators to his apartment, No steps to navigate. Pt was able to transfer independently PTA to/from w/c but is non-ambulatory. Pt owns w/c, RW, Rollator, electric scooter, and tub bench.

## 2020-06-22 NOTE — PROGRESS NOTE ADULT - SUBJECTIVE AND OBJECTIVE BOX
Patient is a 79y old  Male who presents with a chief complaint of SVT/Wide Complex Tachycardia, LBBB, new onset CHFrEF, NSTEMI (20 Jun 2020 08:34)    INTERVAL HPI/OVERNIGHT EVENTS:  Patient seen and examined at bedside. Patient this AM has no acute complaints. Patient states he feels much better. Patient reports good appetite    ROS: Denies CP, SOB, BRITO, abd pain, nausea/vomiting, diarrhea/constipation, fever chills, LE pain.    Monitor: SR    Vital Signs Last 24 Hrs  T(C): 36.9 (22 Jun 2020 07:32), Max: 36.9 (22 Jun 2020 07:32)  T(F): 98.5 (22 Jun 2020 07:32), Max: 98.5 (22 Jun 2020 07:32)  HR: 67 (22 Jun 2020 07:32) (48 - 81)  BP: 126/70 (22 Jun 2020 07:32) (89/59 - 126/70)  BP(mean): --  RR: 20 (22 Jun 2020 07:32) (18 - 20)  SpO2: 95% (22 Jun 2020 07:32) (95% - 99%)    Physical Exam  GEN - appears age appropriate. well nourished. pleasant. no distress.   HEENT - NCAT, EOMI, MAMI  RESP - +rhonchi, +scattered rales. not on supplemental O2.  CARDIO - NS1S2, RRR. No murmurs  GI - Soft/Non tender/Non distended. Normal BS x4 quadrants.   Ext - No DENG. Lt Medial malleolus - chronic wound, no gross discharge noted, ring finger chronic wound healed over  MSK - Rt > LE weakness. ROM LE difficult to participate due to pain  Neuro - cn 2-12 grossly intact.  Psych- AAOx4. no suicidal/homicidal ideation. appropriate behaviour. attentive. normal affect.    LABS:               11.2   6.15  )-----------( 159      ( 22 Jun 2020 08:17 )             33.2     06-21    135  |  99  |  17.0  ----------------------------<  79  4.3   |  25.0  |  0.77    Ca    8.7      21 Jun 2020 07:18  Phos  2.3     06-21  Mg     1.7     06-21    TPro  x   /  Alb  x   /  TBili  x   /  DBili  x   /  AST  34  /  ALT  x   /  AlkPhos  x   06-21          RADIOLOGY & ADDITIONAL TESTS:  < from: TTE Echo Complete w/o Contrast w/ Doppler (06.19.20 @ 22:21) >  Summary:   1. Left ventricular ejection fraction, by visual estimation, is <20%.   2. Severely decreased global left ventricular systolic function.   3. Spectral Dopplershows impaired relaxation pattern of left ventricular myocardial filling (Grade I diastolic dysfunction).   4. Mild mitral annular calcification.   5. The mitral valve leaflets are tethered which is due to reduced systolic function and elevated LVDP.   6. Sclerotic aortic valve with normal opening.   7. Dilated Ao root at 4.1cm. Dilated Asc Ao at 4.4cm.   8. Endocardial visualization was enhanced with intravenous echo contrast.    < end of copied text >    < from: Xray Chest 1 View-PORTABLE IMMEDIATE (06.19.20 @ 18:44) >  The lungs demonstrate soft tissue fullness in the right hilar and right paratracheal region unchanged from prior examination. The costophrenicangles are not imaged on the film limiting evaluation. The lung apices are partially obscured by patient's overlying jewelry limiting evaluation. The heart and mediastinum appear intact.      IMPRESSION: Limited study. Stable soft tissue fullness noted in the right hilar and right paratracheal region.    < end of copied text >    ECG  17:08: wide complex regular tachycardia 193 bpm, LBBB morphology  ECG 17:22: sinus rhythm 61 bpm, LBBB, nonspecific ST abnormality    MEDICATIONS  (STANDING):  aMIOdarone    Tablet 400 milliGRAM(s) Oral every 12 hours  aspirin  chewable 81 milliGRAM(s) Oral daily  atorvastatin 20 milliGRAM(s) Oral at bedtime  clopidogrel Tablet 75 milliGRAM(s) Oral daily  heparin   Injectable 5000 Unit(s) SubCutaneous every 8 hours  metoprolol succinate ER 25 milliGRAM(s) Oral daily  sacubitril 24 mG/valsartan 26 mG 1 Tablet(s) Oral two times a day

## 2020-06-22 NOTE — PROGRESS NOTE ADULT - SUBJECTIVE AND OBJECTIVE BOX
Department of Cardiology                                                                  [facility]/[place] [address] [address]                                                            Telephone: [phone]. Fax:[phone]                                                                              Pre-Procedure Note: [facility]      Narrative:  78 y/o M, never smoker/ former EtOH (quit 17 years ago), with a PMHx of CVA R sided hemiparesis, HTN, HLD, b/l hip replacements, right knee arthritis (rec's steroid injections), BILL (CPAP non compliant) presents to the cath lab for left heart cardiac catheterization due to: NSTEMI, new onset cardiomyopathy, new onset LBBB/ wide complex tachycardia.     Pt reports on Friday, 6/19/20, with c/o of severe indigestion and diaphoresis in which he had never felt before, nothing made it worse or better, and when he began to feel indigestion again; he called EMS and they brought him to the [facility].  Since then, his troponins have been elevated (most recent; 1.08, 1.11, .068) and his echocardiogram revealed an ejection fraction <20%; he was started on entresto this hospitalization.  Pt does not think he has ever been diagnosed with heart failure.     At the onset of his symptoms, he did not have any chest pain, palpitations, pre syncope, syncope, denies sick contacts, fevers, chills.    	  MEDICATIONS:  aMIOdarone    Tablet 400 milliGRAM(s) Oral every 12 hours  metoprolol succinate ER 25 milliGRAM(s) Oral daily  sacubitril 24 mG/valsartan 26 mG 1 Tablet(s) Oral two times a day  tamsulosin 0.4 milliGRAM(s) Oral at bedtime  acetaminophen   Tablet .. 650 milliGRAM(s) Oral every 6 hours PRN  buPROPion XL . 150 milliGRAM(s) Oral daily  atorvastatin 20 milliGRAM(s) Oral at bedtime  aspirin  chewable 81 milliGRAM(s) Oral daily  clopidogrel Tablet 75 milliGRAM(s) Oral daily  heparin   Injectable 5000 Unit(s) SubCutaneous every 8 hours      ASA: 3  Mallampati: 2  Bleeding Risk: 1.7%  Creatinine: 0.78  GFR: 87    PHYSICAL EXAM:    T(C): 36.3 (06-22-20 @ 12:37), Max: 36.9 (06-22-20 @ 07:32)  HR: 55 (06-22-20 @ 12:37) (48 - 81)  BP: 109/65 (06-22-20 @ 12:37) (89/59 - 126/70)  RR: 18 (06-22-20 @ 12:37) (18 - 20)  SpO2: 95% (06-22-20 @ 12:37) (95% - 99%)      Constitutional: A & O x 3  HEENT:   Normal oral mucosa	  Cardiovascular: LBBB, S1 S2,   Respiratory: Lungs clear to auscultation	  Gastrointestinal:  Soft, Non-tender, + BS	  Skin:  bilateral groins excoriated, No ecchymoses, No cyanosis  Neurologic: Non-focal  Extremities:  No clubbing, cyanosis or edema  Vascular: Peripheral pulses palpable 2+ bilaterally      [ ] Echocardiogram:  < from: TTE Echo Complete w/o Contrast w/ Doppler (06.19.20 @ 22:21) >  PHYSICIAN INTERPRETATION:  Left Ventricle: Endocardial visualization was enhanced with intravenous echo contrast. The left ventricular internal cavity size is normal.  Global LV systolic function was severely decreased. Left ventricular ejection fraction, by visual estimation, is <20%. Spectral Doppler shows impaired relaxation pattern of left ventricular myocardial filling (Grade I diastolic dysfunction).  Right Ventricle: The right ventricular size is normal. RV systolic function is normal.  Left Atrium: Mild to moderately enlarged left atrium.  Right Atrium: Normal right atrial size.  Pericardium: There is no evidence of pericardial effusion.  Mitral Valve: The mitral valve leaflets are tethered which is due to reduced systolic function and elevated LVDP. There is mild mitral annular calcification. Moderate mitral valve regurgitation isseen.  Tricuspid Valve: The tricuspid valve is not well seen. Mild tricuspid regurgitation is visualized.  Aortic Valve: The aortic valve was not well visualized. Sclerotic aortic valve with normal opening. No evidence of aortic valve regurgitation is seen.  Pulmonic Valve: The pulmonic valve was not well visualized.  Aorta: Dilated Ao root at 4.1cm. Dilated Asc Ao at 4.4cm.  Pulmonary Artery: The pulmonary artery is not well seen.  Venous: The pulmonary veins were not well visualized. The inferior vena cava was dilated, with respiratory size variation greater than 50%.       Summary:   1. Left ventricular ejection fraction, by visual estimation, is <20%.   2. Severely decreased global left ventricular systolic function.   3. Spectral Dopplershows impaired relaxation pattern of left ventricular myocardial filling (Grade I diastolic dysfunction).   4. Mild mitral annular calcification.   5. The mitral valve leaflets are tethered which is due to reduced systolic function and elevated LVDP.   6. Sclerotic aortic valve with normal opening.   7. Dilated Ao root at 4.1cm. Dilated Asc Ao at 4.4cm.   8. Endocardial visualization was enhanced with intravenous echo contrast.   9. Results d/w Dr. Limon    LABS:	 	                        11.2   6.15  )-----------( 159      ( 22 Jun 2020 08:17 )             33.2     06-22    134<L>  |  101  |  17.0  ----------------------------<  78  4.4   |  24.0  |  0.78    Ca    8.7      22 Jun 2020 08:17  Phos  2.7     06-22  Mg     1.7     06-22    TPro  x   /  Alb  x   /  TBili  x   /  DBili  x   /  AST  34  /  ALT  x   /  AlkPhos  x   06-21        ASSESSMENT: 78 y/o M, never smoker/ former EtOH (quit 17 years ago), with a PMHx of CVA R sided hemiparesis, HTN, HLD, b/l hip replacements, right knee arthritis (rec's steroid injections), BILL (CPAP non compliant) presents to the cath lab for left heart cardiac catheterization due to: NSTEMI, new onset cardiomyopathy, new onset LBBB/ wide complex tachycardia.       -consent to be obtained with Dr. Robert  -procedure discussed with patient; risks and benefits explained, questions answered  -labs and ECG reviewed  -asa and plavix pre cath

## 2020-06-22 NOTE — PROGRESS NOTE ADULT - ASSESSMENT
78 y/o M with PMHX CVA with hemiparesis, HLD, HTN, Osteoarthritis, cardiomyopathy LVEF unknown, poor functional status who is wheelchair bound (non-ambulatory baseline) c/o acute onset chest pain since yesterday afternoon BIBEMS noted to be in wide complex tachycardia in 190 bpm, refractory to adenosine, cardizem and amiodarone boluses s/p emergent synchronized cardioversion with successful conversion to NSR. TTE with severe LVEF <20% without hemodynamically significant valve stenosis or regurgitation.      Wide Complex tachycardia/cardiomyopathy with severely reduced LVEF, chronicity unknown   - VT vs SVT with aberrancy. The patient is currently in sinus rhythm   - c/w metoprolol XL 25mg   - c/w Entresto 24-26mg   - c/w amiodarone loadinmg BID x5 days followed by 200mg BID   - continue ASA and statin therapy   - goal K>4 and Mg>2  - BNP 4576  - likely for EPS as outpatient pending results of [facility]  - [facility] on d/c given severely LVEF reduction   - NPO since midnight for [facility] today    NSTEMI type 2   - elevated troponins, now peaked and on downtrend  -likely demand ischemia from rates >190 on EKG  - plan for [facility] on monday   -c/w metoprolol, ASA, statin    Leukocytosis - resolved  -elevated on admission likely reactive, afebrile  - cxr noted wit w/ possible infiltration, small infiltrate of right middle lobe, but pt asymptomatic  -cont to monitor    Urinary frequency likely 2/2 to BPH and poor bladder emptying  - pt complains of incomplete bladder emptying, and poor  - will bladder scan post void  - c/w flomax    CARLA  - resolved, Cr now 0.77  -Possibly decrased perfusion from SVT/CHF. Unclear baseline.   -on admission sCr 1.51  -c/w entresto given pt's low cardiac output - monitor sCr  -EF <20% - use IVF with caution    Elevated AST  -incidental finding, normal on admission  -anicteric, no RUQ pain    Depression/mood disorder  pt takes wellbutrin 150mg XR BID -   c/w with wellbutrin 150mg XR daily  medication confirmed with pharmacy    Hx CVA, OA, HLD  -c/w ASA and Plavix  -c/w Statin  -tylenol for Pain control  -PT consult after [facility]    Nonambulatory Baseline  -wheelchair bound. 2/2 "Botched" Hip Replacement and OA    DVT PPX: heparin    [facility] Directive: Full code     Disposition: home Pending [facility], PT juliana. Attempted to update sister Maite who lives in [place]. Number given by pt [phone] is NOT working.

## 2020-06-22 NOTE — PROGRESS NOTE ADULT - SUBJECTIVE AND OBJECTIVE BOX
Department of Cardiology                                                                  [facility]/[place] [address] [address] [facility]                                                            Telephone: [phone]. Fax:[phone]                                                                           Post-Procedure Note: R&LH      Narrative:  79y  Male is now s/p left and right heart catheterization via right radial and brachial approach (no site complications) with Dr. Robert  Right radial precautions  Ostial LAD lesion 90%  Resolute roderick BENSON 4.50 x15MM  SAT RA 65.8%  RA 21/19/12  RV 39/8/15  PCW 22/21/18  PA 41/11/22  SAT AO 91.5%  SAT PA 61.2%  Heparin used: 12,000 units  Contrast used 226ml   at 2:56pm         PAST MEDICAL & SURGICAL HISTORY:  Stroke  Osteoarthritis  Hyperlipidemia  Depression  HTN (hypertension)  S/P stroke due to cerebrovascular disease    FAMILY HISTORY:  Family history unknown    Home Medications:  Albuterol (Eqv-ProAir HFA) 90 mcg/inh inhalation aerosol: 2 puff(s) inhaled every 6 hours, As Needed (20 Jun 2020 15:08)  Aspirin Enteric Coated 81 mg oral delayed release tablet: 1 tab(s) orally once a day (20 Jun 2020 15:08)  atorvastatin 20 mg oral tablet: 1 tab(s) orally once a day (20 Jun 2020 15:08)  clopidogrel 75 mg oral tablet: 1 tab(s) orally once a day (20 Jun 2020 15:08)  tamsulosin 0.4 mg oral capsule: 1 cap(s) orally once a day (20 Jun 2020 15:08)  Wellbutrin  mg/24 hours oral tablet, extended release: 1 tab(s) orally every 12 hours (20 Jun 2020 15:08)    Patient is a 79y old  Male who presents with a chief complaint of SVT/Wide Complex Tachycardia, LBBB, new onset CHFrEF, NSTEMI (22 Jun 2020 12:52)    HPI:  78 y/o M with PMHX CVA, HLD, HTN, Osteoarthritis, nonmabulatory baseline wheel-chair bound c/o acute onset chestpain since yesterday afternoon BIBEMS noted to be in SVT s/p Adenosine 6/12 and then Cardizem 20mg IVP without improvement. EKG in ER with wide ommplex tachycardia given additional 6/12/12 Adenosine and then Amiodarone load 150mg IVP x1. Subsequently emergently cardiuoverted successfully with repeat EKG demonstrating Sinus Rhythm with LBBB (unclear chronicity). Pt evaluated by Cardiology and started on BB. Echo shows new onset CHFrEF LVEF <20% with diastolic Dysfunction. Pt seen/examined in CDU. Denies current complaints. No prior similar episodes. No orthopnea or PND. Laying down flat without issue. No exacerbating/alleviating factors. Says he feels great although he does c/o chronic LE edema (though minimal present on exam at time of admission). Denies any other complaints. No cough, wheezing, CP, SOB, Fever/Chills, N/V/D, or abd pain. No urinary complaints. ROS Negative unless mentioned above. (20 Jun 2020 02:06)    General: No fatigue, no fevers/chills  Respiratory: No dyspnea, no cough, no wheeze  CV: No chest pain, no palpitations  Abd: No nausea  Neuro: No headache, no dizziness      Objective:  Vital Signs Last 24 Hrs  T(C): 36.3 (22 Jun 2020 12:37), Max: 36.9 (22 Jun 2020 07:32)  T(F): 97.4 (22 Jun 2020 12:37), Max: 98.5 (22 Jun 2020 07:32)  HR: 48 (22 Jun 2020 15:22) (48 - 81)  BP: 134/62 (22 Jun 2020 15:22) (89/59 - 134/62)  BP(mean): --  RR: 18 (22 Jun 2020 15:22) (18 - 20)  SpO2: 100% (22 Jun 2020 15:22) (95% - 100%)    Neuro: A&OX3  Lungs: CTA B/L  CV: S1, S2  Abd: Soft  Right Radial artery Cath Site: Soft, no bleeding, no hematoma  Extremity: + distal pulses  EKG: sinus bradycardia 46bpm LBBB                        11.2   6.15  )-----------( 159      ( 22 Jun 2020 08:17 )             33.2     06-22    134<L>  |  101  |  17.0  ----------------------------<  78  4.4   |  24.0  |  0.78    Ca    8.7      22 Jun 2020 08:17  Phos  2.7     06-22  Mg     1.7     06-22    TPro  x   /  Alb  x   /  TBili  x   /  DBili  x   /  AST  34  /  ALT  x   /  AlkPhos  x   06-21      79y  Male is now s/p left and right heart catheterization via right radial and brachial approach (no site complications) with Dr. Robert    -ADMIT pt (already in-patient); dye load >200ml, LAD lesion  -post cardiac cath orders  -radial groin precautions  -EKG post cardiac cath and stent placement  -Labs and EKG in am; added hgb a1c and lipid panel  -continue current medical therapy  -Hospitalist management  -DAPT (ASA and plavix)  -statin  -BB  -follow up outpt in 2 weeks with [facility]   -cardiac rehab info provided/referral and communication to cardiac rehab completed Department of Cardiology                                                                  [facility]/[place] [address] [address] [facility]                                                            Telephone: [phone]. Fax:[phone]                                                                           Post-Procedure Note: R&LH      Narrative:  79y  Male is now s/p left and right heart catheterization via right radial and brachial approach (no site complications) with Dr. Robert  Right radial precautions  Ostial LAD lesion 90%  Resolute roderick BENSON 4.50 x15MM  SAT RA 65.8%  RA 21/19/12  RV 39/8/15  PCW 22/21/18  PA 41/11/22  SAT AO 91.5%  SAT PA 61.2%  Heparin used: 12,000 units  Contrast used 226ml   at 2:56pm         PAST MEDICAL & SURGICAL HISTORY:  Stroke  Osteoarthritis  Hyperlipidemia  Depression  HTN (hypertension)  S/P stroke due to cerebrovascular disease    FAMILY HISTORY:  Family history unknown    Home Medications:  Albuterol (Eqv-ProAir HFA) 90 mcg/inh inhalation aerosol: 2 puff(s) inhaled every 6 hours, As Needed (20 Jun 2020 15:08)  Aspirin Enteric Coated 81 mg oral delayed release tablet: 1 tab(s) orally once a day (20 Jun 2020 15:08)  atorvastatin 20 mg oral tablet: 1 tab(s) orally once a day (20 Jun 2020 15:08)  clopidogrel 75 mg oral tablet: 1 tab(s) orally once a day (20 Jun 2020 15:08)  tamsulosin 0.4 mg oral capsule: 1 cap(s) orally once a day (20 Jun 2020 15:08)  Wellbutrin  mg/24 hours oral tablet, extended release: 1 tab(s) orally every 12 hours (20 Jun 2020 15:08)    Patient is a 79y old  Male who presents with a chief complaint of SVT/Wide Complex Tachycardia, LBBB, new onset CHFrEF, NSTEMI (22 Jun 2020 12:52)    HPI:  80 y/o M with PMHX CVA, HLD, HTN, Osteoarthritis, nonmabulatory baseline wheel-chair bound c/o acute onset chestpain since yesterday afternoon BIBEMS noted to be in SVT s/p Adenosine 6/12 and then Cardizem 20mg IVP without improvement. EKG in ER with wide ommplex tachycardia given additional 6/12/12 Adenosine and then Amiodarone load 150mg IVP x1. Subsequently emergently cardiuoverted successfully with repeat EKG demonstrating Sinus Rhythm with LBBB (unclear chronicity). Pt evaluated by Cardiology and started on BB. Echo shows new onset CHFrEF LVEF <20% with diastolic Dysfunction. Pt seen/examined in CDU. Denies current complaints. No prior similar episodes. No orthopnea or PND. Laying down flat without issue. No exacerbating/alleviating factors. Says he feels great although he does c/o chronic LE edema (though minimal present on exam at time of admission). Denies any other complaints. No cough, wheezing, CP, SOB, Fever/Chills, N/V/D, or abd pain. No urinary complaints. ROS Negative unless mentioned above. (20 Jun 2020 02:06)    General: No fatigue, no fevers/chills  Respiratory: No dyspnea, no cough, no wheeze  CV: No chest pain, no palpitations  Abd: No nausea  Neuro: No headache, no dizziness      Objective:  Vital Signs Last 24 Hrs  T(C): 36.3 (22 Jun 2020 12:37), Max: 36.9 (22 Jun 2020 07:32)  T(F): 97.4 (22 Jun 2020 12:37), Max: 98.5 (22 Jun 2020 07:32)  HR: 48 (22 Jun 2020 15:22) (48 - 81)  BP: 134/62 (22 Jun 2020 15:22) (89/59 - 134/62)  BP(mean): --  RR: 18 (22 Jun 2020 15:22) (18 - 20)  SpO2: 100% (22 Jun 2020 15:22) (95% - 100%)    Neuro: A&OX3  Lungs: CTA B/L  CV: S1, S2  Abd: Soft  Right Radial artery Cath Site: Soft, no bleeding, no hematoma  Extremity: + distal pulses  EKG: sinus bradycardia 46bpm LBBB                        11.2   6.15  )-----------( 159      ( 22 Jun 2020 08:17 )             33.2     06-22    134<L>  |  101  |  17.0  ----------------------------<  78  4.4   |  24.0  |  0.78    Ca    8.7      22 Jun 2020 08:17  Phos  2.7     06-22  Mg     1.7     06-22    TPro  x   /  Alb  x   /  TBili  x   /  DBili  x   /  AST  34  /  ALT  x   /  AlkPhos  x   06-21      79y  Male is now s/p left and right heart catheterization via right radial and brachial approach (no site complications) with Dr. Robert    -ADMIT pt (already in-patient); dye load >200ml, LAD lesion, age >75, NSTEMI  -post cardiac cath orders  -radial groin precautions  -IVF as rx NS 250ml x1hour  -EKG post cardiac cath and stent placement  -Labs and EKG in am; added hgb a1c and lipid panel  -continue current medical therapy  -Hospitalist management  -DAPT (ASA and plavix)  -statin  -BB  -follow up outpt in 2 weeks with [facility]   -cardiac rehab info provided/referral and communication to cardiac rehab completed

## 2020-06-23 NOTE — PROGRESS NOTE ADULT - SUBJECTIVE AND OBJECTIVE BOX
[place] [facility] - [facility]                                   [address] [place] [address]                                                      PHONE: [phone]                                                         FAX: [phone]  [email or web address]  ---------------------------------------------------------------------------------------------------------------------------------    Overnight events/patient complaints:  Patient feeling well.  He had PCI to ostial LAD yesterday.     PAST MEDICAL & SURGICAL HISTORY:  Stroke  Osteoarthritis  Hyperlipidemia  Depression  HTN (hypertension)  S/P stroke due to cerebrovascular disease      No Known Allergies    MEDICATIONS  (STANDING):  aMIOdarone    Tablet 400 milliGRAM(s) Oral every 12 hours  aspirin  chewable 81 milliGRAM(s) Oral daily  atorvastatin 20 milliGRAM(s) Oral at bedtime  buPROPion XL . 150 milliGRAM(s) Oral daily  clopidogrel Tablet 75 milliGRAM(s) Oral daily  heparin   Injectable 5000 Unit(s) SubCutaneous every 8 hours  metoprolol succinate ER 25 milliGRAM(s) Oral daily  sacubitril 24 mG/valsartan 26 mG 1 Tablet(s) Oral two times a day  sodium chloride 0.9%. 1000 milliLiter(s) (250 mL/Hr) IV Continuous <Continuous>  tamsulosin 0.4 milliGRAM(s) Oral at bedtime    MEDICATIONS  (PRN):  acetaminophen   Tablet .. 650 milliGRAM(s) Oral every 6 hours PRN Temp greater or equal to 38C (100.4F), Mild Pain (1 - 3), Moderate Pain (4 - 6)  acetaminophen   Tablet .. 650 milliGRAM(s) Oral every 6 hours PRN Mild Pain (1 - 3)  aluminum hydroxide/magnesium hydroxide/simethicone Suspension 30 milliLiter(s) Oral every 4 hours PRN Dyspepsia  ondansetron Injectable 4 milliGRAM(s) IV Push every 6 hours PRN Nausea and/or Vomiting  oxyCODONE    IR 5 milliGRAM(s) Oral every 6 hours PRN Moderate Pain (4 - 6)      Vital Signs Last 24 Hrs  T(C): 36.6 (23 Jun 2020 05:40), Max: 36.6 (22 Jun 2020 11:24)  T(F): 97.9 (23 Jun 2020 05:40), Max: 97.9 (23 Jun 2020 05:40)  HR: 54 (23 Jun 2020 05:40) (43 - 56)  BP: 128/71 (23 Jun 2020 05:40) (109/65 - 134/62)  BP(mean): --  RR: 20 (23 Jun 2020 05:40) (18 - 20)  SpO2: 100% (23 Jun 2020 05:40) (95% - 100%)  ICU Vital Signs Last 24 Hrs  SASKIA ELIAS  I&O's Detail    I&O's Summary    Drug Dosing Weight  SASKIA ELIAS      PHYSICAL EXAM:  General: Appears well developed, well nourished alert and cooperative.  HEENT: Head; normocephalic, atraumatic.  Eyes: Pupils reactive, cornea wnl.  Neck: Supple, no nodes adenopathy, no NVD or carotid bruit or thyromegaly.  CARDIOVASCULAR: Normal S1 and S2, No murmur, rub, gallop or lift.   LUNGS: No rales, rhonchi or wheeze. Normal breath sounds bilaterally.  ABDOMEN: Soft, nontender without mass or organomegaly. bowel sounds normoactive.  EXTREMITIES: No clubbing, cyanosis or edema. Distal pulses wnl. +2 radial pulse  SKIN: warm and dry with normal turgor.  NEURO: Alert/oriented x 3/normal motor exam. No pathologic reflexes.    PSYCH: normal affect.        LABS:                        12.0   7.07  )-----------( 156      ( 23 Jun 2020 06:03 )             36.6     06-23    137  |  101  |  16.0  ----------------------------<  79  4.5   |  25.0  |  0.83    Ca    8.9      23 Jun 2020 06:03  Phos  2.7     06-22  Mg     1.7     06-23      SASKIA ELIAS  CARDIAC MARKERS ( 21 Jun 2020 17:49 )  x     / 1.08 ng/mL / x     / x     / x                  CARDIAC CATHETERIZATION:    ASSESSMENT AND PLAN:  In summary, SASKIA ELIAS is an 79y Male with past medical history significant for CVA, hemiparesis with new cardiomyopathy s/p BENSON to ostial LAD for 90% stenosis.    VT- likely from LAD ischemia.  s/p pci to LAD    Cardiomyopathy- entresto/toprol xl    Lifevest today    CAD- Continue DAPT for one year with asa and plavix    DC home today with office follow up.  [facility] for 3 months. DESIREE andrews in our office.     Thank you for allowing [facility] to participate in the care of this patient.  Please feel free to call with any questions or concerns.

## 2020-06-23 NOTE — PROGRESS NOTE ADULT - ASSESSMENT
78 y/o M with PMHX CVA, HLD, HTN, Osteoarthritis, nonmabulatory baseline wheel-chair bound c/o acute onset chestpain since yesterday afternoon BIBEMS noted to be in SVT s/p Adenosine 6/12 and then Cardizem 20mg IVP without improvement. EKG in ER with wide ommplex tachycardia given additional 6/12/12 Adenosine and then Amiodarone load 150mg IVP x1. Subsequently emergently cardiuoverted successfully with repeat EKG demonstrating Sinus Rhythm with LBBB (unclear chronicity). Pt evaluated by Cardiology and started on BB. Echo shows new onset CHFrEF LVEF <20% with diastolic Dysfunction. Pt seen/examined in CDU. Denies current complaints. No prior similar episodes. No orthopnea or PND. Laying down flat without issue. No exacerbating/alleviating factors. Says he feels great although he does c/o chronic LE edema (though minimal present on exam at time of admission). Denies any other complaints. No cough, wheezing, CP, SOB, Fever/Chills, N/V/D, or abd pain. No urinary complaints. ROS Negative unless mentioned above.     79y  Male is now s/p left and right heart catheterization via right radial and brachial approach (no site complications) with Dr. Robert  Right radial precautions  Ostial LAD lesion 90%  Resolute roderick BENSON 4.50 x15MM  SAT RA 65.8%  RA 21/19/12  RV 39/8/15  PCW 22/21/18  PA 41/11/22  SAT AO 91.5%  SAT PA 61.2%  Heparin used: 12,000 units  Contrast used 226ml   at 2:56pm      CAD  - s/p LHC x1 BENSON to oLAD 90%   - continue ASA, plavix, toprol, entresto, lipitor   - Diet/lifestyle modifications and medication compliance heavily reinforced   - further plan of care by [facility] cardiovascular    S/P Coronary Angiogram   - Patient educated about wrist site care, signs and symptoms of complication post procedure, and plan of care moving forward. Patient verbalized understanding with teach-back.   - cardiac rehab info provided/referral and communication to cardiac rehab completed

## 2020-06-23 NOTE — DISCHARGE NOTE PROVIDER - HOSPITAL COURSE
80 y/o M with PMHX CVA with hemiparesis, HLD, HTN, Osteoarthritis, cardiomyopathy LVEF unknown, poor functional status who is wheelchair bound (non-ambulatory baseline) c/o acute onset chest pain since day prior to admission BIBEMS noted to be in wide complex tachycardia in 190 bpm, refractory to adenosine, cardizem and amiodarone boluses s/p emergent synchronized cardioversion with successful conversion to NSR. TTE with severe LVEF <20% without hemodynamically significant valve stenosis or regurgitation.  VT vs SVT with aberrancy. The patient is currently in sinus rhythm, started on metoprolol XL 25mg, Entresto 24-26mg, amiodarone 200mg BID after loading doses.         Hospital course further complicated by NSTEMI type 2, elevated troponins. Pt went for LHC on 6/22, found  to have 90% stenosis of proximal LAD, now s/p 1 BENSON. Lifevest on d/c given severely LVEF reduction         CARLA, present on admission, likely due to decreased perfusion frmo SVT/CHF, now resolved, Cr now 0.77. EF <20% - use IVF with caution        Patient was medically optimized and improved clinically throughout hospital. Patient seen and examined on day of discharge.        Vital Signs Last 24 Hrs    T(C): 36.5 (23 Jun 2020 10:45), Max: 36.6 (22 Jun 2020 21:30)    T(F): 97.7 (23 Jun 2020 10:45), Max: 97.9 (23 Jun 2020 05:40)    HR: 56 (23 Jun 2020 10:45) (43 - 56)    BP: 118/68 (23 Jun 2020 10:45) (109/65 - 134/62)    BP(mean): --    RR: 17 (23 Jun 2020 10:45) (17 - 20)    SpO2: 99% (23 Jun 2020 10:45) (95% - 100%)        Physical Exam:    GEN - appears age appropriate. well nourished. pleasant. no distress.     HEENT - NCAT, EOMI, MAMI    RESP - +rhonchi, decreased breath sounds likely from body habitus. not on supplemental O2.    CARDIO - NS1S2, RRR. No murmurs    GI - Soft/Non tender/Non distended. Normal BS x4 quadrants.     Ext - No DENG. Lt Medial malleolus - chronic wound, no gross discharge noted, ring finger chronic wound healed over    MSK - Rt > LE weakness. ROM LE difficult to participate due to pain    Neuro - cn 2-12 grossly intact.    Psych- AAOx4. no suicidal/homicidal ideation. appropriate behaviour. attentive. normal affect.        Patient is medically stable and cleared for discharge.         < from: TTE Echo Complete w/o Contrast w/ Doppler (06.19.20 @ 22:21) >        Summary:     1. Left ventricular ejection fraction, by visual estimation, is <20%.     2. Severely decreased global left ventricular systolic function.     3. Spectral Dopplershows impaired relaxation pattern of left ventricular myocardial filling (Grade I diastolic dysfunction).     4. Mild mitral annular calcification.     5. The mitral valve leaflets are tethered which is due to reduced systolic function and elevated LVDP.     6. Sclerotic aortic valve with normal opening.     7. Dilated Ao root at 4.1cm. Dilated Asc Ao at 4.4cm.     8. Endocardial visualization was enhanced with intravenous echo contrast.    MD Rene Electronically signed on 6/19/2020 at 11:48:45 PM             < end of copied text > 80 y/o M with PMHX CVA with hemiparesis, HLD, HTN, Osteoarthritis, cardiomyopathy LVEF unknown, poor functional status who is wheelchair bound (non-ambulatory baseline) c/o acute onset chest pain since day prior to admission BIBEMS noted to be in wide complex tachycardia in 190 bpm, refractory to adenosine, cardizem and amiodarone boluses s/p emergent synchronized cardioversion with successful conversion to NSR. TTE with severe LVEF <20% without hemodynamically significant valve stenosis or regurgitation.  VT vs SVT with aberrancy. The patient is currently in sinus rhythm, started on metoprolol XL 25mg, Entresto 24-26mg, amiodarone 200mg BID after loading doses.         Hospital course further complicated by NSTEMI type 2, elevated troponins. Pt went for LHC on 6/22, found  to have 90% stenosis of proximal LAD, now s/p 1 BENSON. Lifevest on d/c given severely LVEF reduction         CARLA, present on admission, likely due to decreased perfusion frmo SVT/CHF, now resolved, Cr now 0.77. EF <20% - use IVF with caution        Patient was medically optimized and improved clinically throughout hospital. Patient seen and examined on day of discharge.        Vital Signs Last 24 Hrs    T(C): 36.5 (23 Jun 2020 10:45), Max: 36.6 (22 Jun 2020 21:30)    T(F): 97.7 (23 Jun 2020 10:45), Max: 97.9 (23 Jun 2020 05:40)    HR: 56 (23 Jun 2020 10:45) (43 - 56)    BP: 118/68 (23 Jun 2020 10:45) (109/65 - 134/62)    BP(mean): --    RR: 17 (23 Jun 2020 10:45) (17 - 20)    SpO2: 99% (23 Jun 2020 10:45) (95% - 100%)        Physical Exam:    GEN - appears age appropriate. well nourished. pleasant. no distress.     HEENT - NCAT, EOMI, MAMI    RESP - +rhonchi, decreased breath sounds likely from body habitus. not on supplemental O2.    CARDIO - NS1S2, RRR. No murmurs    GI - Soft/Non tender/Non distended. Normal BS x4 quadrants.     Ext - No DENG. Lt Medial malleolus - chronic wound, no gross discharge noted, ring finger chronic wound healed over    MSK - Rt > LE weakness. ROM LE difficult to participate due to pain    Neuro - cn 2-12 grossly intact.    Psych- AAOx4. no suicidal/homicidal ideation. appropriate behaviour. attentive. normal affect.        Patient is medically stable and cleared for discharge.         < from: TTE Echo Complete w/o Contrast w/ Doppler (06.19.20 @ 22:21) >        Summary:     1. Left ventricular ejection fraction, by visual estimation, is <20%.     2. Severely decreased global left ventricular systolic function.     3. Spectral Dopplershows impaired relaxation pattern of left ventricular myocardial filling (Grade I diastolic dysfunction).     4. Mild mitral annular calcification.     5. The mitral valve leaflets are tethered which is due to reduced systolic function and elevated LVDP.     6. Sclerotic aortic valve with normal opening.     7. Dilated Ao root at 4.1cm. Dilated Asc Ao at 4.4cm.     8. Endocardial visualization was enhanced with intravenous echo contrast.    MD Rene Electronically signed on 6/19/2020 at 11:48:45 PM             < end of copied text >        time spent for dc 50 mins

## 2020-06-23 NOTE — DISCHARGE NOTE PROVIDER - CARE PROVIDER_API CALL
Juan Diego Yi)  Internal Medicine  [address] [place] [id number]  Phone: [phone]  Fax: [phone]  Follow Up Time: 1 week    Gold Coelho [facility]  MEDICINE  [address] [place] [address]  Phone: [phone]  Fax: [phone]  Follow Up Time: 1-3 days    Raheem Rossi  CRITICAL CARE MEDICINE  [address] [place] [address]  Phone: [phone]  Fax: [phone]  Follow Up Time: Routine

## 2020-06-23 NOTE — DISCHARGE NOTE NURSING/CASE MANAGEMENT/SOCIAL WORK - PATIENT PORTAL LINK FT
You can access the FollowMyHealth Patient Portal offered by [facility] by registering at the following website: [email or web address]. By joining [employer]’s FollowMyHealth portal, you will also be able to view your health information using other applications (apps) compatible with our system.

## 2020-06-23 NOTE — DISCHARGE NOTE PROVIDER - PROVIDER TOKENS
PROVIDER:[TOKEN:[[id number]],FOLLOWUP:[1 week]],PROVIDER:[TOKEN:[[id number]],FOLLOWUP:[1-3 days]],PROVIDER:[TOKEN:[[id number]],FOLLOWUP:[Routine]]

## 2020-06-23 NOTE — DISCHARGE NOTE PROVIDER - NSDCMRMEDTOKEN_GEN_ALL_CORE_FT
acetaminophen 325 mg oral tablet: 2 tab(s) orally every 6 hours, As needed, Temp greater or equal to 38C (100.4F), Mild Pain (1 - 3), Moderate Pain (4 - 6)  Albuterol (Eqv-ProAir HFA) 90 mcg/inh inhalation aerosol: 2 puff(s) inhaled every 6 hours, As Needed  amiodarone 200 mg oral tablet: 2 tab(s) orally every 12 hours. one more dose on 6/23 at 6 pm, and 2 doses and and pm dose on 6/24.  amiodarone 200 mg oral tablet: 1 tab(s) orally every 12 hours  Aspirin Enteric Coated 81 mg oral delayed release tablet: 1 tab(s) orally once a day  atorvastatin 20 mg oral tablet: 1 tab(s) orally once a day (at bedtime)  clopidogrel 75 mg oral tablet: 1 tab(s) orally once a day  metoprolol succinate 25 mg oral tablet, extended release: 1 tab(s) orally once a day  sacubitril-valsartan 24 mg-26 mg oral tablet: 1 tab(s) orally 2 times a day  tamsulosin 0.4 mg oral capsule: 1 cap(s) orally once a day  Wellbutrin  mg/24 hours oral tablet, extended release: 1 tab(s) orally every 12 hours acetaminophen 325 mg oral tablet: 2 tab(s) orally every 6 hours, As needed, Temp greater or equal to 38C (100.4F), Mild Pain (1 - 3), Moderate Pain (4 - 6)  Albuterol (Eqv-ProAir HFA) 90 mcg/inh inhalation aerosol: 2 puff(s) inhaled every 6 hours, As Needed  amiodarone 200 mg oral tablet: 2 tab(s) orally every 12 hours. one more dose on 6/23 at 6 pm, and 2 doses and and pm dose on 6/24.  amiodarone 200 mg oral tablet: 1 tab(s) orally every 12 hours  Aspirin Enteric Coated 81 mg oral delayed release tablet: 1 tab(s) orally once a day  atorvastatin 20 mg oral tablet: 1 tab(s) orally once a day (at bedtime)  clopidogrel 75 mg oral tablet: 1 tab(s) orally once a day  metoprolol succinate 25 mg oral tablet, extended release: 1 tab(s) orally once a day  sacubitril-valsartan 24 mg-26 mg oral tablet: 1 tab(s) orally 2 times a day  tamsulosin 0.4 mg oral capsule: 1 cap(s) orally once a day  Wellbutrin  mg/24 hours oral tablet, extended release: 1 tab(s) orally once a day

## 2020-06-23 NOTE — PROGRESS NOTE ADULT - REASON FOR ADMISSION
SVT/Wide Complex Tachycardia, LBBB, new onset CHFrEF, NSTEMI

## 2020-06-23 NOTE — PROGRESS NOTE ADULT - SUBJECTIVE AND OBJECTIVE BOX
[facility]            [facility]                          [address] [place] [address]                       Phone: [phone]. Fax:[phone]                      ________________________________________________           Reason for follow up/Overnight events: post cath     HPI:  78 y/o M with PMHX CVA, HLD, HTN, Osteoarthritis, nonmabulatory baseline wheel-chair bound c/o acute onset chestpain since yesterday afternoon BIBEMS noted to be in SVT s/p Adenosine  and then Cardizem 20mg IVP without improvement. EKG in ER with wide ommplex tachycardia given additional 12 Adenosine and then Amiodarone load 150mg IVP x1. Subsequently emergently cardiuoverted successfully with repeat EKG demonstrating Sinus Rhythm with LBBB (unclear chronicity). Pt evaluated by Cardiology and started on BB. Echo shows new onset CHFrEF LVEF <20% with diastolic Dysfunction. Pt seen/examined in CDU. Denies current complaints. No prior similar episodes. No orthopnea or PND. Laying down flat without issue. No exacerbating/alleviating factors. Says he feels great although he does c/o chronic LE edema (though minimal present on exam at time of admission). Denies any other complaints. No cough, wheezing, CP, SOB, Fever/Chills, N/V/D, or abd pain. No urinary complaints. ROS Negative unless mentioned above. (2020 02:06)      ROS: All review of systems negative unless indicated otherwise below.                          LAB RESULTS                   COMPLETE BLOOD COUNT( 2020 06:03 )                            12.0 g/dL<L>  7.07 K/uL )---------------( 156 K/uL                        36.6 %<L>      Automated Differential     Auto Basophil # - X      Auto Basophil % - X      Auto Eosinophil # - X      Auto Eosinophil % - X      Auto Immature Granulocyte # - X      Auto Immature Granulocyte % - X      Auto Lymphocyte # - X      Auto Lymphocyte % - X      Auto Monocyte # - X      Auto Monocyte % - X      Auto Neutrophil # - X      Auto Neutrophil % - X                                      CHEMISTRY                 Basic Metabolic Panel (20 @ 06:03)    137  |  101  |  16.0  ----------------------------<  79  4.5   |  25.0  |  0.83    Ca    8.9      2020 06:03  Phos  2.7       Mg     1.7     -23                    Liver Functions (20 @ 07:18))  TPro  x   /  Alb  x   /  TBili  x   /  DBili  x   /  AST  34  /  ALT  x   /  AlkPhos  x      PT/INR/PTT ( 2020 06:40 )                        :                       :      13.5         :       X                     .        .                   .              .           .       1.19        .                                                                   Cardiac Enzymes   ( 2020 17:49 )  Troponin T  1.08<H>,  CPK  X    , CKMB  X    , BNP X        , ( 2020 07:46 )  Troponin T  1.11<H>,  CPK  X    , CKMB  X    , BNP X        , ( 2020 13:43 )  Troponin T  0.68<H>,  CPK  X    , CKMB  X    , BNP X                              RADIOLOGY RESULTS: Personally visualized   < from: Xray Chest 1 View- PORTABLE-Urgent (20 @ 09:32) >    Impression:Small infiltrate in RIGHT middle lobe    < end of copied text >                          CARDIOLOGY RESULTS: Official Report/Preliminary Verbal Reports  ECHO:   < from: TTE Echo Complete w/o Contrast w/ Doppler (20 @ 22:21) >  Summary:   1. Left ventricular ejection fraction, by visual estimation, is <20%.   2. Severely decreased global left ventricular systolic function.   3. Spectral Dopplershows impaired relaxation pattern of left ventricular myocardial filling (Grade I diastolic dysfunction).   4. Mild mitral annular calcification.   5. The mitral valve leaflets are tethered which is due to reduced systolic function and elevated LVDP.   6. Sclerotic aortic valve with normal opening.   7. Dilated Ao root at 4.1cm. Dilated Asc Ao at 4.4cm.   8. Endocardial visualization was enhanced with intravenous echo contrast.   9. Results d/w Dr. Ashly López MD Electronically signed on 2020 at 11:48:45 PM    < end of copied text >    CATH:   < from: Cardiac Cath Lab - Adult (20 @ 13:33) >  VENTRICLES: No LV gram wasperformed; however, a recent echocardiogram  demonstrated an EF of 20 %.  CORONARY VESSELS: The coronary circulation is left dominant.  LM:   --  Proximal left main: Angiography showed minor luminal  irregularities with no flow limiting lesions.  --Mid left main: Angiography showed minor luminal irregularities with no  flow limiting lesions.  --  Distal left main: Angiography showed minor luminal irregularities with  no flow limiting lesions.  LAD:   --  Ostial LAD: There was a 90 % stenosis.  --  Proximal LAD: There was a 90 % stenosis.  --  Mid LAD: Angiography showed minor luminal irregularities with no flow  limiting lesions.  --  D2: Angiography showed minor luminal irregularities with no flow  limiting lesions.  --  D3: Angiography showed minor luminal irregularities with no flow  limiting lesions.  CX:   --  Proximal circumflex: Angiography showed minor luminal  irregularities with no flow limiting lesions.  --  Mid circumflex: Angiography showed minor luminal irregularities withno  flow limiting lesions.  --  Distal circumflex: Angiography showed minor luminal irregularities with  no flow limiting lesions.  --  L AV groove: Angiography showed minor luminal irregularities with no  flow limiting lesions.  --  LPDA: Angiography showed minor luminal irregularities with no flow  limiting lesions.  RCA:   --  Proximal RCA: Angiography showed minor luminal irregularities  with no flow limiting lesions.  --  Mid RCA: Angiography showed minor luminal irregularities with no flow  limiting lesions.  COMPLICATIONS: There were no complications.  DIAGNOSTIC IMPRESSIONS: 90% ostial LAD stenosis s/p IVUS assisted PTCA and  4.5 x 15 BENSON  DIAGNOSTIC RECOMMENDATIONS: ASA 81mg daily  plavix 75mg daily  high dose statin  beta blocker  ace inhibitor  aldactone  lifevest  EP evaluation as an outpatient  Echo in 3 months for potential ICD  INTERVENTIONAL IMPRESSIONS: 90% ostial LAD stenosis s/p IVUS assisted PTCA  and 4.5 x 15 BENSON  INTERVENTIONAL RECOMMENDATIONS: ASA 81mg daily  [id number] daily  high dose statin  beta blocker  ace inhibitor  aldactone  lifevest  EP evaluation as an outpatient  Echo in 3 months for potential ICD  Prepared and signed by  Roger Robert MD    < end of copied text >                            CARDIOLOGY REVIEW: Personally visualized and reviewed    EKG: NSR no ectopy, RBBB  Telemetry Last 24h: SB 50s 60s                             DAILY WEIGHTS - 48 HOUR TREND     Daily Weight in k.3 (2020 04:08)                             INTAKE AND OUTPUT - 48 HOUR TREND       HOME MEDICATIONS:  Albuterol (Eqv-ProAir HFA) 90 mcg/inh inhalation aerosol: 2 puff(s) inhaled every 6 hours, As Needed (2020 15:08)  Aspirin Enteric Coated 81 mg oral delayed release tablet: 1 tab(s) orally once a day (2020 15:08)  atorvastatin 20 mg oral tablet: 1 tab(s) orally once a day (2020 15:08)  clopidogrel 75 mg oral tablet: 1 tab(s) orally once a day (2020 15:08)  tamsulosin 0.4 mg oral capsule: 1 cap(s) orally once a day (2020 15:08)  Wellbutrin  mg/24 hours oral tablet, extended release: 1 tab(s) orally every 12 hours (2020 15:08)                             Current Admission Active Medications    acetaminophen   Tablet .. 650 milliGRAM(s) Oral every 6 hours PRN Temp greater or equal to 38C (100.4F), Mild Pain (1 - 3), Moderate Pain (4 - 6)  acetaminophen   Tablet .. 650 milliGRAM(s) Oral every 6 hours PRN Mild Pain (1 - 3)  aluminum hydroxide/magnesium hydroxide/simethicone Suspension 30 milliLiter(s) Oral every 4 hours PRN Dyspepsia  aMIOdarone    Tablet 400 milliGRAM(s) Oral every 12 hours  aspirin  chewable 81 milliGRAM(s) Oral daily  atorvastatin 20 milliGRAM(s) Oral at bedtime  buPROPion XL . 150 milliGRAM(s) Oral daily  clopidogrel Tablet 75 milliGRAM(s) Oral daily  heparin   Injectable 5000 Unit(s) SubCutaneous every 8 hours  metoprolol succinate ER 25 milliGRAM(s) Oral daily  ondansetron Injectable 4 milliGRAM(s) IV Push every 6 hours PRN Nausea and/or Vomiting  oxyCODONE    IR 5 milliGRAM(s) Oral every 6 hours PRN Moderate Pain (4 - 6)  sacubitril 24 mG/valsartan 26 mG 1 Tablet(s) Oral two times a day  sodium chloride 0.9%. 1000 milliLiter(s) (250 mL/Hr) IV Continuous <Continuous>  tamsulosin 0.4 milliGRAM(s) Oral at bedtime                        PHYSICAL EXAM:    Vital Signs Last 24 Hrs  T(C): 36.6 (2020 05:40), Max: 36.6 (2020 11:24)  T(F): 97.9 (2020 05:40), Max: 97.9 (2020 05:40)  HR: 54 (2020 05:40) (43 - 56)  BP: 128/71 (2020 05:40) (109/65 - 134/62)  BP(mean): --  RR: 20 (2020 05:40) (18 - 20)  SpO2: 100% (2020 05:40) (95% - 100%)    GENERAL: NAD  NECK: Supple, No JVD  NERVOUS SYSTEM:  Alert & Oriented X3, non focal neuro exam.   CHEST/LUNG: clear lungs, No rales, rhonchi, wheezing, or rubs  HEART: Regular rate and rhythm; s1 and s2 auscultated, No murmurs, rubs, or gallops  ABDOMEN: Soft, Nontender, Nondistended; Bowel sounds present and normoactive.   EXTREMITIES:  2+ Peripheral Pulses, No clubbing, cyanosis, or edema  CATH SITE: Right wrist benign s/p cath.  No bleeding, no ecchymosis, no hematoma. Extremity Warm to touch, with palpable distal pulses, and brisk capillary refill.

## 2020-07-16 NOTE — ED ADULT NURSE REASSESSMENT NOTE - NS ED NURSE REASSESS COMMENT FT1
at approximately 2210, I called Dr. Vizcaino over to bedside urgently, patient converted from normal sinus w/ Left Bundle Branch Block to possibly V-Tach at 160s.  EKG was performed, patient was given IVP Lidocaine 50mg and 50mg x 2.  Patient converted to Normal Sinus Rhythm at approx 2220 with Amiodorone give IVPB.  Patient now stable, awaiting labs and consults.  Patient remains Alert and oriented to person, place, and time, 500ml bolus infusing, LS remain clear without any adventious sounds.

## 2020-07-16 NOTE — ED PROVIDER NOTE - CLINICAL SUMMARY MEDICAL DECISION MAKING FREE TEXT BOX
Pt history of VT and heart failure requiring external wearable defibrillator came in with general malaise, near syncope, while in ED had multiple episodes of ventricular tachycardia with a pulse, responded to IV bolus of lidocaine initially but still with paroxysmal VT. Electrolytes grossly normal. Was transiently hypotensive but responded to small bolus of crystalloid. MICU consulted for admission. EP consulted via [facility].

## 2020-07-16 NOTE — ED PROVIDER NOTE - OBJECTIVE STATEMENT
80 y/o M pt with PMHx CAD, cardiac stent placement, presents to the ED c/o weakness and episodes of unresponsiveness.  Denies .  No further acute complaints at this time. 78 y/o M pt with PMHx CAD, cardiac stent placement, presents to the ED c/o weakness and episodes of unresponsiveness beginning today.  He reports feeling unwell for the past several days, then at approx noon today he began to feel very weak and reports having several episodes of passing out.  He wears a LifeVest and states the alarm went off several times today, he kept pressing a button to turn it off and then the vest would alarm again several minutes after.  He then decided to call 911, and took the LifeVest off at home.  No heart fluttering.  His lifeVest has never shocked him before.  Denies CP, fever, chills, N/V.  No further acute complaints at this time. 78 y/o M pt with PMHx CAD, cardiac stent placement, presents to the ED c/o weakness and episodes of unresponsiveness beginning today.  He reports feeling generally unwell for the past several days, then at approx noon today he began to feel very weak and reports having several episodes of nearly passing out.  He wears a LifeVest and states the alarm went off several times today, he kept pressing a button to turn it off and then the vest would alarm again several minutes after.  He then decided to call 911, and took the LifeVest off at home. denies any chest pain palpitations or shocks from the lifevest. Had a witnessed episode of unresponsiveness in ambulance triage that reoslved spontaneously.

## 2020-07-16 NOTE — H&P ADULT - NSHPLABSRESULTS_GEN_ALL_CORE
Lab Results:  CBC  CBC Full  -  ( 16 Jul 2020 21:38 )  WBC Count : 12.76 K/uL  RBC Count : 4.04 M/uL  Hemoglobin : 13.0 g/dL  Hematocrit : 38.4 %  Platelet Count - Automated : 219 K/uL  Mean Cell Volume : 95.0 fl  Mean Cell Hemoglobin : 32.2 pg  Mean Cell Hemoglobin Concentration : 33.9 gm/dL  Auto Neutrophil # : 9.46 K/uL  Auto Lymphocyte # : 1.81 K/uL  Auto Monocyte # : 1.20 K/uL  Auto Eosinophil # : 0.15 K/uL  Auto Basophil # : 0.02 K/uL  Auto Neutrophil % : 74.1 %  Auto Lymphocyte % : 14.2 %  Auto Monocyte % : 9.4 %  Auto Eosinophil % : 1.2 %  Auto Basophil % : 0.2 %    .		Differential:	[] Automated		[] Manual  Chemistry                        13.0   12.76 )-----------( 219      ( 16 Jul 2020 21:38 )             38.4     07-16    135  |  100  |  25.0<H>  ----------------------------<  141<H>  4.8   |  23.0  |  1.57<H>    Ca    9.1      16 Jul 2020 21:38  Mg     1.8     07-16    TPro  6.0<L>  /  Alb  3.6  /  TBili  0.9  /  DBili  x   /  AST  12  /  ALT  9   /  AlkPhos  69  07-16    LIVER FUNCTIONS - ( 16 Jul 2020 21:38 )  Alb: 3.6 g/dL / Pro: 6.0 g/dL / ALK PHOS: 69 U/L / ALT: 9 U/L / AST: 12 U/L / GGT: x           PT/INR - ( 16 Jul 2020 21:38 )   PT: 13.7 sec;   INR: 1.19 ratio         PTT - ( 16 Jul 2020 21:38 )  PTT:26.8 sec          MICROBIOLOGY/CULTURES:      RADIOLOGY RESULTS:  CXR: official report pending, otherwise AGUILA

## 2020-07-16 NOTE — ED PROVIDER NOTE - CONSTITUTIONAL, MLM
normal... Awake, non-toxic, and in no apparent distress. Awake, non-toxic, chronically ill appearing. - - -

## 2020-07-16 NOTE — ED ADULT NURSE REASSESSMENT NOTE - NS ED NURSE REASSESS COMMENT FT1
Patient with another episode of tachycardia, Dr. Jameson in Emergency Department, called back to bedside, Dr. Jameson, possibly supraventricular tachycardia or VT, given 150 Amiodorone slow intravenous push, converted back to Sinus Masoud w/ Left Bundle Branch Block.

## 2020-07-16 NOTE — ED ADULT TRIAGE NOTE - CHIEF COMPLAINT QUOTE
Pt complaining of generalized "not feeling great I feel weak." Pt had stent placed 3 weeks ago, supposed to be wearing life pack took it off because it "was making a ton of noises." Pt in triage became unresponsive and pale taken into critical care for urgent intervention. MD Ozuna at bedside.

## 2020-07-16 NOTE — H&P ADULT - NSHPPHYSICALEXAM_GEN_ALL_CORE
GENERAL: NAD, well-groomed, well-developed  HEAD:  Atraumatic, Normocephalic  EYES: EOMI, PERRLA, conjunctiva and sclera clear  ENMT:  Moist mucous membranes  NECK: Supple, No JVD, Normal thyroid  NERVOUS SYSTEM:  Alert & Oriented X3, Good concentration; Motor Strength 3/5 B/L upper and lower extremities; DTRs 2+ intact and symmetric  CHEST/LUNG: CTA  bilaterally; No rales, rhonchi, wheezing, or rubs  HEART: irregular rate and rhythm; No murmurs, rubs, or gallops  ABDOMEN: Soft, Nontender, Nondistended; Bowel sounds present  EXTREMITIES:  2+ Peripheral Pulses, No clubbing, cyanosis, or edema  SKIN: No rashes or lesions

## 2020-07-16 NOTE — H&P ADULT - HISTORY OF PRESENT ILLNESS
80 yo M PMH HTN, HLD, CAD 80 yo M PMH HTN, HLD, CAD, OA, CVA (10 years ago) now wheel chair bound from OA/CVA, admitted June 2020 for SVT that progressed to VT was successfully cardioverted, new onset ischemic cardiomyopathy s/p LHC and stent placement in 6/2020 presents to the ED c/o weakness and episodes of unresponsiveness beginning today.  He reports feeling unwell for the past several days, then at approx noon today he began to feel very weak and reports having several episodes of passing out.  He wears a LifeVest and states the alarm went off several times today, he kept pressing a button to turn it off and then the vest would alarm again several minutes after.  He then decided to call 911, and took the LifeVest off at home    In ED pt went unresponsive in VT was given total 100 of lido and started on amio gtt, BP dropped ton SBP 60's then after maintaining NSR SBPin 80's, pt given gentle bolus of 500cc. Pt had multiple episodes of VT in ED while on amio gtt, pt converted spontaneously twice then was given another 50 of lidocaine, and started on Liodcaine gtt. Pt BP remained normotensive throughout and, pt was asymptomatic.     Multiple calls made out to cards/EPservice pending eval, pads and zol at bedside will likely need device placed in AM. Pt admitted to the ICU for VT/SVT, ischemic cardiomyopathy HFrEF <20% from previous admission,  CARLA pre-renal vs. ATN

## 2020-07-16 NOTE — H&P ADULT - NSHPREVIEWOFSYSTEMS_GEN_ALL_CORE
CONSTITUTIONAL: No fever, weight loss, or fatigue  EYES: + dizziness   ENMT: No sinus or throat pain  RESPIRATORY: No cough, wheezing, chills or hemoptysis; No shortness of breath  CARDIOVASCULAR: No chest pain, palpitations, dizziness, or leg swelling  GASTROINTESTINAL: No abdominal pain. No nausea, vomiting, or hematemesis; No diarrhea or constipation. No melena   GENITOURINARY: No dysuria, frequency, hematuria, or incontinence  NEUROLOGICAL: No headaches, memory loss, loss of strength, numbness, or tremors  SKIN: No itching, burning, rashes, or lesions   ENDOCRINE: No heat or cold intolerance; No hair loss  MUSCULOSKELETAL: No joint pain or swelling; No muscle, back, or extremity pain

## 2020-07-16 NOTE — H&P ADULT - ASSESSMENT
ASSESSMENT   1. VT - symptomatic   2. CAD-ischemic cardiomyopathy HFrEF <20% from previous admission   3. CARLA pre-renal vs. ATN     PLAN     NEURO:   -Neurologically intact at this    PULM:    -Pt SpO2 maintained on NC   -Hold albuterol in the setting of arrhthymias     CV:   -Pt s/p 100 total IVP lidocaine   -Pt s/p amio bolus, now on amio gtt over 24 hrs, converted with amio  -If fails with amio gtt will start Lidocaine gtt   -STAT cards and EP c/s for implanted defibrillator ASAP   -Serial EKG and CE's   -TTE   -Replace Mg 2 g  -ASA/PLavix   -Hold BB given intermittent hypotension     GI:    -NPO after midnight for potential intervention in AM     :   -Seemingly dry s/p 500 cc bolus, gentle hydration given recent dx of cardiomyopahty with reduced EF  -Check repeat labs and monitor renal function trend  -IVF hydration as julian  Avoid ACEI, ARB and NSAIDS.   -Monitor input and output.    ENDO:   -Thyroid panel     ID:   -[id number]     HEME/DVT PPX:   -Heparin SQ   -ASA   -Plavix   -PT/INR for potential     ETHICS        CODE STATUS: FULL ODE       Care discussed with bedside provider and eICU attending. ASSESSMENT   1. VT - symptomatic   2. CAD-ischemic cardiomyopathy HFrEF <20% from previous admission   3. CARLA pre-renal vs. ATN     PLAN     NEURO:   -Neurologically intact at this    PULM:    -Pt SpO2 maintained on NC   -Hold albuterol in the setting of arrhthymias     CV:   -Pt s/p 100 total IVP lidocaine   -Pt s/p amio bolus, now on amio gtt over 24 hrs, converted with amio  -If fails with amio gtt will start Lidocaine gtt   -STAT cards and EP c/s for implanted defibrillator ASAP   -Serial EKG and CE's   -TTE   -Replace Mg 2 g  -ASA/PLavix   -Hold BB given intermittent hypotension     GI:    -NPO after midnight for potential intervention in AM     :   -Seemingly dry s/p 500 cc bolus, gentle hydration given recent dx of cardiomyopahty with reduced EF  -Check repeat labs and monitor renal function trend  -IVF hydration as julian  Avoid ACEI, ARB and NSAIDS.   -Monitor input and output.    ENDO:   -Thyroid panel     ID:   -[id number]     HEME/DVT PPX:   -Heparin SQ   -ASA   -Plavix   -PT/INR in AM for potential defibrillator placement     ETHICS        CODE STATUS: FULL CODE       Care discussed with bedside provider and eICU attending. ASSESSMENT   80 yo M PMH HTN, HLD, CAD, OA, CVA (10 years ago) now wheel chair bound from OA/CVA, admitted June 2020 for SVT that progressed to VT was successfully cardioverted, new onset ischemic cardiomyopathy s/p LHC and stent placement in 6/2020 presents to the ED c/o weakness and episodes of unresponsiveness beginning today.  Pt admitted to the ICU for VT/SVT, ischemic cardiomyopathy HFrEF <20% from previous admission,  CARLA pre-renal vs. ATN     1. VT - symptomatic   2. CAD-ischemic cardiomyopathy HFrEF <20% from previous admission   3. CARLA pre-renal vs. ATN     PLAN     NEURO:   -Neurologically intact at this    PULM:    -Pt SpO2 maintained on NC   -Hold albuterol in the setting of arrhthymias     CV:   -Pt s/p 100 total IVP lidocaine   -Pt s/p amio bolus, now on amio gtt over 24 hrs, converted with amio  -If fails with amio gtt will start Lidocaine gtt   -zol at bedside with pads in may require possible cardioversion   -STAT cards and EP c/s for implanted defibrillator ASAP   -Serial EKG and CE's   -TTE   -Replace Mg 2 g  -ASA/PLavix   -Hold BB given intermittent hypotension     GI:    -NPO after midnight for potential intervention in AM     :   -Seemingly dry s/p 500 cc bolus, gentle hydration given recent dx of cardiomyopahty with reduced EF  -Check repeat labs and monitor renal function trend  -IVF hydration as julian  Avoid ACEI, ARB and NSAIDS.   -Monitor input and output.    ENDO:   -Thyroid panel     ID:   -[id number]     HEME/DVT PPX:   -Heparin SQ   -ASA   -Plavix   -PT/INR in AM for potential defibrillator placement     ETHICS        CODE STATUS: FULL CODE       Care discussed with bedside provider and eICU attending.       Critical Care time: 75 mins assessing presenting problems of acute illness that poses high probability of life threatening deterioration or end organ damage/dysfunction.  Medical decision making including Initiating plan of care, reviewing data, reviewing radiology, direct patient bedside evaluation and interpretation of vital signs, any necessary ventilator management , discussion with multidisciplinary team, discussing goals of care with patient/family, all non inclusive of procedures

## 2020-07-17 NOTE — CONSULT NOTE ADULT - ASSESSMENT
9y Male with past medical history significant for remote CVA with hemiparesis with poor functional status who is wheelchair bound, ischemic cardiomyopathy s/p LHC and stent placement in 6/2020 presents to the ED c/o weakness and episodes of unresponsiveness in the setting of VT    CAD  - Continue ASA and Plavix    ICMP with VT  - Will set up for cath today to assess LAD stent  - If stent patent and no significant progression of CAD, will need EP evaluation for BiV-ICD for secondary prevention  - Continue amiodarone with lidocaine gtt  - ICU monitoring    HTN  - BP controlled on metoprolol     Dyslipidemia  - Continue statin    Plan discussed with ICU team

## 2020-07-17 NOTE — PROGRESS NOTE ADULT - SUBJECTIVE AND OBJECTIVE BOX
On Admission  07-16-20 (1d)  HPI:  78 yo M PMH HTN, HLD, CAD, OA, CVA (10 years ago) now wheel chair bound from OA/CVA, admitted June 2020 for SVT that progressed to VT was successfully cardioverted, new onset ischemic cardiomyopathy s/p LHC and stent placement in 6/2020 presents to the ED c/o weakness and episodes of unresponsiveness beginning today.  He reports feeling unwell for the past several days, then at approx noon today he began to feel very weak and reports having several episodes of passing out.  He wears a LifeVest and states the alarm went off several times today, he kept pressing a button to turn it off and then the vest would alarm again several minutes after.  He then decided to call 911, and took the LifeVest off at home    In ED pt went unresponsive in VT was given total 100 of lido and started on amio gtt, BP dropped ton SBP 60's then after maintaining NSR SBPin 80's, pt given gentle bolus of 500cc. Pt had multiple episodes of VT in ED while on amio gtt, pt converted spontaneously twice then was given another 50 of lidocaine, and started on Liodcaine gtt. Pt BP remained normotensive throughout and, pt was asymptomatic.     Multiple calls made out to cards/EPservice pending eval, pads and zol at bedside will likely need device placed in AM. Pt admitted to the ICU for VT/SVT, ischemic cardiomyopathy HFrEF <20% from previous admission,  CARLA pre-renal vs. ATN (16 Jul 2020 23:19)    PAST MEDICAL & SURGICAL HISTORY:  Stroke  Osteoarthritis  Hyperlipidemia  Depression  HTN (hypertension)  S/P stroke due to cerebrovascular disease      Antimicrobial:    Cardiovascular:  aMIOdarone    Tablet 200 milliGRAM(s) Oral two times a day  lidocaine   Infusion 1 mG/Min IV Continuous <Continuous>    Pulmonary:    Hematalogic:  aspirin  chewable 81 milliGRAM(s) Oral daily  clopidogrel Tablet 75 milliGRAM(s) Oral daily  heparin   Injectable 5000 Unit(s) SubCutaneous every 8 hours    Other:  acetaminophen   Tablet .. 650 milliGRAM(s) Oral every 6 hours PRN  atorvastatin 20 milliGRAM(s) Oral at bedtime  chlorhexidine 4% Liquid 1 Application(s) Topical <User Schedule>  nystatin Cream 1 Application(s) Topical two times a day      Drug Dosing Weight  Height (cm): 182.9 (17 Jul 2020 01:09)  Weight (kg): 99 (17 Jul 2020 01:09)  BMI (kg/m2): 29.6 (17 Jul 2020 01:09)  BSA (m2): 2.21 (17 Jul 2020 01:09)    T(C): 36.8 (07-17-20 @ 07:23), Max: 36.8 (07-17-20 @ 04:24)  HR: 52 (07-17-20 @ 07:00)  BP: 117/57 (07-17-20 @ 07:00)  BP(mean): 78 (07-17-20 @ 07:00)  ABP: --  ABP(mean): --  RR: 31 (07-17-20 @ 07:00)  SpO2: 98% (07-17-20 @ 07:00)          07-16 @ 07:01  -  07-17 @ 07:00  --------------------------------------------------------  IN: 708.3 mL / OUT: 0 mL / NET: 708.3 mL              LABS:  CBC Full  -  ( 17 Jul 2020 05:49 )  WBC Count : 8.77 K/uL  RBC Count : 3.44 M/uL  Hemoglobin : 11.2 g/dL  Hematocrit : 32.5 %  Platelet Count - Automated : 181 K/uL  Mean Cell Volume : 94.5 fl  Mean Cell Hemoglobin : 32.6 pg  Mean Cell Hemoglobin Concentration : 34.5 gm/dL  Auto Neutrophil # : 6.51 K/uL  Auto Lymphocyte # : 1.38 K/uL  Auto Monocyte # : 0.76 K/uL  Auto Eosinophil # : 0.05 K/uL  Auto Basophil # : 0.01 K/uL  Auto Neutrophil % : 74.2 %  Auto Lymphocyte % : 15.7 %  Auto Monocyte % : 8.7 %  Auto Eosinophil % : 0.6 %  Auto Basophil % : 0.1 %    07-17    136  |  102  |  28.0<H>  ----------------------------<  109<H>  4.2   |  23.0  |  1.26    Ca    9.0      17 Jul 2020 05:49  Phos  3.4     07-17  Mg     2.2     07-17    TPro  5.3<L>  /  Alb  3.2<L>  /  TBili  1.0  /  DBili  x   /  AST  11  /  ALT  7   /  AlkPhos  62  07-17    PT/INR - ( 16 Jul 2020 21:38 )   PT: 13.7 sec;   INR: 1.19 ratio         PTT - ( 16 Jul 2020 21:38 )  PTT:26.8 sec      ____________________________________________________________________________________________________

## 2020-07-17 NOTE — CHART NOTE - NSCHARTNOTEFT_GEN_A_CORE
Upon Nutritional Assessment by the Registered Dietitian your patient was determined to meet criteria / has evidence of the following diagnosis/diagnoses:          [ ]  Mild Protein Calorie Malnutrition        [ x]  Moderate Protein Calorie Malnutrition        [ ] Severe Protein Calorie Malnutrition        [ ] Unspecified Protein Calorie Malnutrition        [ ] Underweight / BMI <19        [ ] Morbid Obesity / BMI > 40    Pt presents at high nutrition risk secondary to malnutrition (moderate, chronic) related to inability to meet sufficient protein-energy in setting of depression, CHF, and skin breakdown as evidenced by moderate muscle loss of temples, clavicles, shoulders; moderate fat loss of orbitals and likely meeting <75% nutrient needs >1 month.    Findings as based on:  •  Comprehensive nutrition assessment and consultation  •  Calorie counts (nutrient intake analysis)  •  Food acceptance and intake status from observations by staff  •  Follow up  •  Patient education  •  Intervention secondary to interdisciplinary rounds  •   concerns      Treatment:    The following has been recommended:  1) Initiate DASH/TLC diet as medically feasible and add Ensure Enlive TID to optimize po intake and provide an additional 350 kcal, 20g protein per serving.  2) Rx: MVI and vit C 500mg daily.   3) Obtain daily weights to monitor trends.      PROVIDER Section:     By signing this assessment you are acknowledging and agree with the diagnosis/diagnoses assigned by the Registered Dietitian    Comments:

## 2020-07-17 NOTE — DIETITIAN INITIAL EVALUATION ADULT. - ETIOLOGY
related to inability to meet sufficient protein-energy in setting of depression, CHF, and skin breakdown

## 2020-07-17 NOTE — DIETITIAN INITIAL EVALUATION ADULT. - PERTINENT MEDS FT
MEDICATIONS  (STANDING):  aMIOdarone    Tablet 200 milliGRAM(s) Oral two times a day  aspirin  chewable 81 milliGRAM(s) Oral daily  atorvastatin 20 milliGRAM(s) Oral at bedtime  buPROPion XL . 150 milliGRAM(s) Oral daily  chlorhexidine 4% Liquid 1 Application(s) Topical <User Schedule>  clopidogrel Tablet 75 milliGRAM(s) Oral daily  heparin   Injectable 5000 Unit(s) SubCutaneous every 8 hours  lidocaine   Infusion 1 mG/Min (7.5 mL/Hr) IV Continuous <Continuous>  metoprolol tartrate 12.5 milliGRAM(s) Oral two times a day  nystatin Cream 1 Application(s) Topical two times a day    MEDICATIONS  (PRN):  acetaminophen   Tablet .. 650 milliGRAM(s) Oral every 6 hours PRN Temp greater or equal to 38C (100.4F), Moderate Pain (4 - 6)

## 2020-07-17 NOTE — ED ADULT NURSE REASSESSMENT NOTE - NS ED NURSE REASSESS COMMENT FT1
patient with another run of VT, 163, rapid called, given 50mg ivp lidocaine, broke and resolved at this time.  PA remains at bedside.

## 2020-07-17 NOTE — PROGRESS NOTE ADULT - SUBJECTIVE AND OBJECTIVE BOX
Nurse Practitioner Progress note:     INTERVAL HISTORY: 79 year old male with h/o HTN, CVA with hemiparesis, CAD with recent oLAD stent (6/2020) and known ischemic cardiomyopathy (EF <20%) who wears a life vest which was alarming.  Pt was found to go in and out of long runs of [employer].        MEDICATIONS:  aMIOdarone    Tablet 200 milliGRAM(s) Oral two times a day  lidocaine   Infusion 1 mG/Min IV Continuous <Continuous>  metoprolol tartrate 12.5 milliGRAM(s) Oral two times a day  acetaminophen   Tablet .. 650 milliGRAM(s) Oral every 6 hours PRN  buPROPion XL . 150 milliGRAM(s) Oral daily  atorvastatin 20 milliGRAM(s) Oral at bedtime  aspirin  chewable 81 milliGRAM(s) Oral daily  chlorhexidine 4% Liquid 1 Application(s) Topical <User Schedule>  clopidogrel Tablet 75 milliGRAM(s) Oral daily  heparin   Injectable 5000 Unit(s) SubCutaneous every 8 hours  nystatin Cream 1 Application(s) Topical two times a day      TELEMETRY: SB    T(C): 36.1 (07-17-20 @ 11:45), Max: 36.8 (07-17-20 @ 04:24)  HR: 56 (07-17-20 @ 11:45) (51 - 163)  BP: 145/70 (07-17-20 @ 11:45) (64/44 - 146/71)  RR: 18 (07-17-20 @ 11:45) (16 - 34)  SpO2: 100% (07-17-20 @ 11:45) (94% - 100%)  Wt(kg): --    PHYSICAL EXAM:  Appearance: Normal	  Cardiovascular: Normal S1 S2, No JVD, No murmurs, No edema  Respiratory: Lungs clear to auscultation	  Psychiatry: A & O x 3, Mood & affect appropriate  Neurologic: Non-focal, A&O X3.  No neuro deficits  Procedure Site: Right radial band in place.  Site benign. No bleeding/hemaotma/ecchymosis. + palp radial pulse    12 lead EKG:  SB 52 bpm. No acute changes	    LABS:	 	               11.2   8.77  )-----------( 181      ( 17 Jul 2020 05:49 )             32.5     07-17    136  |  102  |  28.0<H>  ----------------------------<  109<H>  4.2   |  23.0  |  1.26    Ca    9.0      17 Jul 2020 05:49  Phos  3.4     07-17  Mg     2.2     07-17    TPro  5.3<L>  /  Alb  3.2<L>  /  TBili  1.0  /  DBili  x   /  AST  11  /  ALT  7   /  AlkPhos  62  07-17    proBNP: Serum Pro-Brain Natriuretic Peptide: 3679 pg/mL (07-16 @ 21:38)    PROCEDURE RESULTS: S/P Cardiac Cath  < from: Cardiac Cath Lab - Adult (07.17.20 @ 12:22) >  CORONARY VESSELS: The coronary circulation is left dominant.  LM:   --  Mid left main: There was a 30 % stenosis.  --  Distal left main: Angiography showed minor luminal irregularities with  no flow limiting lesions.  LAD:   --  Proximal LAD: There was a 0 % stenosis at the site of a prior  stent.  --  Mid LAD: There was a 70 % stenosis.  CX:   --  Proximal circumflex: Angiography showed minor luminal  irregularities with no flow limiting lesions.  --  Mid circumflex: Angiography showed minor luminal irregularities with no  flow limiting lesions.  --  Distal circumflex: Angiography showed minor luminal irregularities with  no flow limiting lesions.  --  L AV groove: Angiography showed minor luminal irregularities with no  flow limiting lesions.  RCA:   --  Proximal RCA: Angiography showed minor luminal irregularities  with no flow limiting lesions.  --  Mid RCA: There was a 40 % stenosis.  COMPLICATIONS: There were no complications. No complications occurred  during the cath lab visit.  SUMMARY:  HEMODYNAMICS: Hemodynamic assessment demonstrates mildly elevated LVEDP.  DIAGNOSTIC IMPRESSIONS: Patent ostial LAD stent.  IVUS and iFR of LAD shoed 70% mid LAD stenosis with positive iFR s/p IVUS  assisted PTCA and 3.0 x 34 BENSON postdilated proximally to 4.5mm.  DIAGNOSTIC RECOMMENDATIONS: Medical therapy for CAD  ICD evaluation for sustained VT.  INTERVENTIONAL IMPRESSIONS: Patent ostialLAD stent.  IVUS and iFR of LAD shoed 70% mid LAD stenosis with positive iFR s/p IVUS  assisted PTCA and 3.0 x 34 BENSON postdilated proximally to 4.5mm.  INTERVENTIONAL RECOMMENDATIONS: Medical therapy for CAD  ICD evaluation for sustained VT.    < end of copied text >      ASSESSMENT/PLAN: 	  -Transfer back to ICU  -Radial precautions  -D/C radial band in 2 hours  -Resume  meds  -Cont lido gtt  -EP to consult  -Site check in AM Nurse Practitioner Progress note:     INTERVAL HISTORY: 79 year old male with h/o HTN, CVA with hemiparesis, CAD with recent oLAD stent (6/2020) and known ischemic cardiomyopathy (EF <20%) who wears a life vest which was alarming.  Pt was found to go in and out of long runs of [employer].        MEDICATIONS:  aMIOdarone    Tablet 200 milliGRAM(s) Oral two times a day  lidocaine   Infusion 1 mG/Min IV Continuous <Continuous>  metoprolol tartrate 12.5 milliGRAM(s) Oral two times a day  acetaminophen   Tablet .. 650 milliGRAM(s) Oral every 6 hours PRN  buPROPion XL . 150 milliGRAM(s) Oral daily  atorvastatin 20 milliGRAM(s) Oral at bedtime  aspirin  chewable 81 milliGRAM(s) Oral daily  chlorhexidine 4% Liquid 1 Application(s) Topical <User Schedule>  clopidogrel Tablet 75 milliGRAM(s) Oral daily  heparin   Injectable 5000 Unit(s) SubCutaneous every 8 hours  nystatin Cream 1 Application(s) Topical two times a day      TELEMETRY: SB    T(C): 36.1 (07-17-20 @ 11:45), Max: 36.8 (07-17-20 @ 04:24)  HR: 56 (07-17-20 @ 11:45) (51 - 163)  BP: 145/70 (07-17-20 @ 11:45) (64/44 - 146/71)  RR: 18 (07-17-20 @ 11:45) (16 - 34)  SpO2: 100% (07-17-20 @ 11:45) (94% - 100%)  Wt(kg): --    PHYSICAL EXAM:  Appearance: Normal	  Cardiovascular: Normal S1 S2, No JVD, No murmurs, No edema  Respiratory: Lungs clear to auscultation	  Psychiatry: A & O x 3, Mood & affect appropriate  Neurologic: Non-focal, A&O X3.  No neuro deficits  Procedure Site: Right radial band in place.  Site benign. No bleeding/hemaotma/ecchymosis. + palp radial pulse    12 lead EKG:  SB 52 bpm. No acute changes	    LABS:	 	               11.2   8.77  )-----------( 181      ( 17 Jul 2020 05:49 )             32.5     07-17    136  |  102  |  28.0<H>  ----------------------------<  109<H>  4.2   |  23.0  |  1.26    Ca    9.0      17 Jul 2020 05:49  Phos  3.4     07-17  Mg     2.2     07-17    TPro  5.3<L>  /  Alb  3.2<L>  /  TBili  1.0  /  DBili  x   /  AST  11  /  ALT  7   /  AlkPhos  62  07-17    proBNP: Serum Pro-Brain Natriuretic Peptide: 3679 pg/mL (07-16 @ 21:38)    PROCEDURE RESULTS: S/P Cardiac Cath  < from: Cardiac Cath Lab - Adult (07.17.20 @ 12:22) >  CORONARY VESSELS: The coronary circulation is left dominant.  LM:   --  Mid left main: There was a 30 % stenosis.  --  Distal left main: Angiography showed minor luminal irregularities with  no flow limiting lesions.  LAD:   --  Proximal LAD: There was a 0 % stenosis at the site of a prior  stent.  --  Mid LAD: There was a 70 % stenosis.  CX:   --  Proximal circumflex: Angiography showed minor luminal  irregularities with no flow limiting lesions.  --  Mid circumflex: Angiography showed minor luminal irregularities with no  flow limiting lesions.  --  Distal circumflex: Angiography showed minor luminal irregularities with  no flow limiting lesions.  --  L AV groove: Angiography showed minor luminal irregularities with no  flow limiting lesions.  RCA:   --  Proximal RCA: Angiography showed minor luminal irregularities  with no flow limiting lesions.  --  Mid RCA: There was a 40 % stenosis.  COMPLICATIONS: There were no complications. No complications occurred  during the cath lab visit.  SUMMARY:  HEMODYNAMICS: Hemodynamic assessment demonstrates mildly elevated LVEDP.  DIAGNOSTIC IMPRESSIONS: Patent ostial LAD stent.  IVUS and iFR of LAD shoed 70% mid LAD stenosis with positive iFR s/p IVUS  assisted PTCA and 3.0 x 34 BENSON postdilated proximally to 4.5mm.  DIAGNOSTIC RECOMMENDATIONS: Medical therapy for CAD  ICD evaluation for sustained VT.  INTERVENTIONAL IMPRESSIONS: Patent ostialLAD stent.  IVUS and iFR of LAD shoed 70% mid LAD stenosis with positive iFR s/p IVUS  assisted PTCA and 3.0 x 34 BENSON postdilated proximally to 4.5mm.  INTERVENTIONAL RECOMMENDATIONS: Medical therapy for CAD  ICD evaluation for sustained VT.    < end of copied text >      ASSESSMENT/PLAN: 	  -Transfer back to ICU  -Radial precautions  -D/C radial band in 2 hours  -Resume  meds  -Cont lido gtt  -EP to consult  -Site check in AM  -Please call with questions/concerns

## 2020-07-17 NOTE — DIETITIAN INITIAL EVALUATION ADULT. - PERTINENT LABORATORY DATA
07-17 Na136 mmol/L Glu 109 mg/dL<H> K+ 4.2 mmol/L Cr  1.26 mg/dL BUN 28.0 mg/dL<H> Phos 3.4 mg/dL Alb 3.2 g/dL<L> PAB n/a

## 2020-07-17 NOTE — CONSULT NOTE ADULT - SUBJECTIVE AND OBJECTIVE BOX
[facility], [facility]                              [address] [address]                                                 PHONE: [phone]                                                 FAX: [phone]  ------------------------------------------------------------------------------------------------    79y Male with past medical history as under known to us from recent admission for ischemic cardiomyopathy s/p [facility] and stent placement in 6/2020 presents to the ED c/o weakness and episodes of unresponsiveness.  He reported feeling unwell for the past several days, then at approx noon today he began to feel very weak and reports having several episodes of passing out.  He wears a LifeVest and states the alarm went off several times he kept pressing a button to turn it off and then the vest would alarm again several minutes after.  He then decided to call 911, and took the LifeVest off at home. In ED pt went unresponsive in VT was given total 100 of lido and started on amio gtt, BP dropped ton SBP 60's then after maintaining NSR SBPin 80's, pt given gentle bolus of 500cc. Pt had multiple episodes of VT in ED while on amio gtt, pt converted spontaneously twice then was given another 50 of lidocaine, and started on Liodcaine gtt.   At the time of evaluation, pt reports feeling well. He reports that he had no chest pain and his shortness of breath was improving    PAST MEDICAL & SURGICAL HISTORY:  Stroke  Osteoarthritis  Hyperlipidemia  Depression  HTN (hypertension)  S/P stroke due to cerebrovascular disease      No Known Allergies      Review of Systems:  Positive for chest pain, shortness of breath, dyspnea on exertion, edema  Rest of the systems were reviewed and was negative.     Family history:   No pertinent family history in first degree relative of early CAD, sudden cardiac death or  congenital heart disease    Social History:  No smoking   No alcohol  No other drug use    Vital Signs Last 24 Hrs  T(C): 36.8 (17 Jul 2020 07:23), Max: 36.8 (17 Jul 2020 04:24)  T(F): 98.2 (17 Jul 2020 07:23), Max: 98.3 (17 Jul 2020 04:24)  HR: 53 (17 Jul 2020 10:00) (51 - 163)  BP: 126/91 (17 Jul 2020 10:00) (64/44 - 132/63)  BP(mean): 104 (17 Jul 2020 10:00) (65 - 107)  RR: 29 (17 Jul 2020 10:00) (16 - 34)  SpO2: 100% (17 Jul 2020 10:00) (94% - 100%)    PHYSICAL EXAM:  Constitutional: Appears well developed, well nourished; alert and co-operative  HEENT:     Head: Normocephalic and atraumatic  Eyes: Conjunctiva normal, No scleral icterus  Neck: Supple, No JVD  Mouth/Throat: Mucous membranes are normal. Mucosa are not pale or dry.  Cardiovascular: regular S1, S2  Respiratory: Lungs clear to auscultation; no crepitations, no wheeze  Gastrointestinal:  Soft, Non-tender, + BS	  Musculoskeletal: Normal range of motion. No edema  Skin: Warm and dry. No cyanosis . No diaphoresis.  Neurologic: Alert oriented to time place and person. Normal strength and no tremor.  Psychiatric: Normal mood and affect, Speech is normal and behavior is normal.        LABS:                        11.2   8.77  )-----------( 181      ( 17 Jul 2020 05:49 )             32.5     07-17    136  |  102  |  28.0<H>  ----------------------------<  109<H>  4.2   |  23.0  |  1.26    Ca    9.0      17 Jul 2020 05:49  Phos  3.4     07-17  Mg     2.2     07-17    TPro  5.3<L>  /  Alb  3.2<L>  /  TBili  1.0  /  DBili  x   /  AST  11  /  ALT  7   /  AlkPhos  62  07-17    CARDIAC MARKERS ( 17 Jul 2020 05:49 )  x     / 0.01 ng/mL / x     / x     / x      CARDIAC MARKERS ( 16 Jul 2020 21:38 )  x     / <0.01 ng/mL / x     / x     / x          PT/INR - ( 16 Jul 2020 21:38 )   PT: 13.7 sec;   INR: 1.19 ratio         PTT - ( 16 Jul 2020 21:38 )  PTT:26.8 sec    RADIOLOGY & ADDITIONAL STUDIES: (reviewed)  CXR was independently reviewed and demonstrated:     CARDIOLOGY TESTING:(reviewed)     ECG (Independent visualization):    ECHOCARDIOGRAM :    STRESS TEST:    CARDIAC CATHETERIZATION:    MEDICATIONS:(reviewed)  Home Medications:    MEDICATIONS  (STANDING):  aMIOdarone    Tablet 200 milliGRAM(s) Oral two times a day  aspirin  chewable 81 milliGRAM(s) Oral daily  atorvastatin 20 milliGRAM(s) Oral at bedtime  buPROPion XL . 150 milliGRAM(s) Oral daily  chlorhexidine 4% Liquid 1 Application(s) Topical <User Schedule>  clopidogrel Tablet 75 milliGRAM(s) Oral daily  heparin   Injectable 5000 Unit(s) SubCutaneous every 8 hours  lidocaine   Infusion 1 mG/Min (7.5 mL/Hr) IV Continuous <Continuous>  metoprolol tartrate 12.5 milliGRAM(s) Oral two times a day  nystatin Cream 1 Application(s) Topical two times a day    MEDICATIONS  (PRN):  acetaminophen   Tablet .. 650 milliGRAM(s) Oral every 6 hours PRN Temp greater or equal to 38C (100.4F), Moderate Pain (4 - 6) [facility]                              [address] [address]                                                 PHONE: [phone]                                                 FAX: [phone]  ------------------------------------------------------------------------------------------------    79y Male with past medical history as under known to us from recent admission for ischemic cardiomyopathy s/p [facility] and stent placement in 6/2020 presents to the ED c/o weakness and episodes of unresponsiveness.  He reported feeling unwell for the past several days, then at approx noon today he began to feel very weak and reports having several episodes of passing out.  He wears a LifeVest and states the alarm went off several times he kept pressing a button to turn it off and then the vest would alarm again several minutes after.  He then decided to call 911, and took the LifeVest off at home. In ED pt went unresponsive in VT was given total 100 of lido and started on amio gtt, BP dropped ton SBP 60's then after maintaining NSR SBPin 80's, pt given gentle bolus of 500cc. Pt had multiple episodes of VT in ED while on amio gtt, pt converted spontaneously twice then was given another 50 of lidocaine, and started on Liodcaine gtt.   At the time of evaluation, pt reports feeling well. He reports that he had no chest pain and his shortness of breath was improving    PAST MEDICAL & SURGICAL HISTORY:  Stroke  Osteoarthritis  Hyperlipidemia  Depression  HTN (hypertension)  S/P stroke due to cerebrovascular disease      No Known Allergies      Review of Systems:  Positive for palpitations, dizziness  Rest of the systems were reviewed and was negative.     Family history:   No pertinent family history in first degree relative of early CAD, sudden cardiac death or  congenital heart disease    Social History:  No smoking   No alcohol  No other drug use    Vital Signs Last 24 Hrs  T(C): 36.8 (17 Jul 2020 07:23), Max: 36.8 (17 Jul 2020 04:24)  T(F): 98.2 (17 Jul 2020 07:23), Max: 98.3 (17 Jul 2020 04:24)  HR: 53 (17 Jul 2020 10:00) (51 - 163)  BP: 126/91 (17 Jul 2020 10:00) (64/44 - 132/63)  BP(mean): 104 (17 Jul 2020 10:00) (65 - 107)  RR: 29 (17 Jul 2020 10:00) (16 - 34)  SpO2: 100% (17 Jul 2020 10:00) (94% - 100%)    PHYSICAL EXAM:  Constitutional: Appears well developed, well nourished; alert and co-operative  HEENT:     Head: Normocephalic and atraumatic  Eyes: Conjunctiva normal, No scleral icterus  Neck: Supple, No JVD  Mouth/Throat: Mucous membranes are normal. Mucosa are not pale or dry.  Cardiovascular: regular S1, S2  Respiratory: Lungs clear to auscultation; no crepitations, no wheeze  Gastrointestinal:  Soft, Non-tender, + BS	  Musculoskeletal: Normal range of motion. No edema  Skin: Warm and dry. No cyanosis . No diaphoresis.  Neurologic: Alert oriented to time place and person. Normal strength and no tremor.  Psychiatric: Normal mood and affect, Speech is normal and behavior is normal.        LABS:                        11.2   8.77  )-----------( 181      ( 17 Jul 2020 05:49 )             32.5     07-17    136  |  102  |  28.0<H>  ----------------------------<  109<H>  4.2   |  23.0  |  1.26    Ca    9.0      17 Jul 2020 05:49  Phos  3.4     07-17  Mg     2.2     07-17    TPro  5.3<L>  /  Alb  3.2<L>  /  TBili  1.0  /  DBili  x   /  AST  11  /  ALT  7   /  AlkPhos  62  07-17    CARDIAC MARKERS ( 17 Jul 2020 05:49 )  x     / 0.01 ng/mL / x     / x     / x      CARDIAC MARKERS ( 16 Jul 2020 21:38 )  x     / <0.01 ng/mL / x     / x     / x          PT/INR - ( 16 Jul 2020 21:38 )   PT: 13.7 sec;   INR: 1.19 ratio         PTT - ( 16 Jul 2020 21:38 )  PTT:26.8 sec    RADIOLOGY & ADDITIONAL STUDIES: (reviewed)  CXR was independently reviewed and demonstrated: clear lungs    CARDIOLOGY TESTING:(reviewed)     ECG (Independent visualization): NSR with LBBB    ECHOCARDIOGRAM :   1. Left ventricular ejection fraction, by visual estimation, is <20%.   2. Severely decreased global left ventricular systolic function.   3. Spectral Dopplershows impaired relaxation pattern of left ventricular myocardial filling (Grade I diastolic dysfunction).   4. Mild mitral annular calcification.   5. The mitral valve leaflets are tethered which is due to reduced systolic function and elevated LVDP.   6. Sclerotic aortic valve with normal opening.   7. Dilated Ao root at 4.1cm. Dilated Asc Ao at 4.4cm.   8. Endocardial visualization was enhanced with intravenous echo contrast.      CARDIAC CATHETERIZATION:  < from: Cardiac Cath Lab - Adult (06.22.20 @ 13:33) >  CORONARY VESSELS: The coronary circulation is left dominant.  LM:   --  Proximal left main: Angiography showed minor luminal  irregularities with no flow limiting lesions.  --Mid left main: Angiography showed minor luminal irregularities with no  flow limiting lesions.  --  Distal left main: Angiography showed minor luminal irregularities with  no flow limiting lesions.  LAD:   --  Ostial LAD: There was a 90 % stenosis.  --  Proximal LAD: There was a 90 % stenosis.  --  Mid LAD: Angiography showed minor luminal irregularities with no flow  limiting lesions.  --  D2: Angiography showed minor luminal irregularities with no flow  limiting lesions.  --  D3: Angiography showed minor luminal irregularities with no flow  limiting lesions.  CX:   --  Proximal circumflex: Angiography showed minor luminal  irregularities with no flow limiting lesions.  --  Mid circumflex: Angiography showed minor luminal irregularities withno  flow limiting lesions.  --  Distal circumflex: Angiography showed minor luminal irregularities with  no flow limiting lesions.  --  L AV groove: Angiography showed minor luminal irregularities with no  flow limiting lesions.  --  LPDA: Angiography showed minor luminal irregularities with no flow  limiting lesions.  RCA:   --  Proximal RCA: Angiography showed minor luminal irregularities  with no flow limiting lesions.  --  Mid RCA: Angiography showed minor luminal irregularities with no flow  limiting lesions.  COMPLICATIONS: There were no complications.  DIAGNOSTIC IMPRESSIONS: 90% ostial LAD stenosis s/p IVUS assisted PTCA and  4.5 x 15 BENSON        MEDICATIONS:(reviewed)  Home Medications:  Home Medications:  acetaminophen 325 mg oral tablet: 2 tab(s) orally every 6 hours, As needed, Temp greater or equal to 38C (100.4F), Mild Pain (1 - 3), Moderate Pain (4 - 6) (23 Jun 2020 12:01)  Albuterol (Eqv-ProAir HFA) 90 mcg/inh inhalation aerosol: 2 puff(s) inhaled every 6 hours, As Needed (20 Jun 2020 15:08)  Aspirin Enteric Coated 81 mg oral delayed release tablet: 1 tab(s) orally once a day (20 Jun 2020 15:08)  atorvastatin 20 mg oral tablet: 1 tab(s) orally once a day (at bedtime) (23 Jun 2020 12:01)  clopidogrel 75 mg oral tablet: 1 tab(s) orally once a day (20 Jun 2020 15:08)  tamsulosin 0.4 mg oral capsule: 1 cap(s) orally once a day (20 Jun 2020 15:08)  Wellbutrin  mg/24 hours oral tablet, extended release: 1 tab(s) orally once a day (23 Jun 2020 15:23)      MEDICATIONS  (STANDING):  aMIOdarone    Tablet 200 milliGRAM(s) Oral two times a day  aspirin  chewable 81 milliGRAM(s) Oral daily  atorvastatin 20 milliGRAM(s) Oral at bedtime  buPROPion XL . 150 milliGRAM(s) Oral daily  chlorhexidine 4% Liquid 1 Application(s) Topical <User Schedule>  clopidogrel Tablet 75 milliGRAM(s) Oral daily  heparin   Injectable 5000 Unit(s) SubCutaneous every 8 hours  lidocaine   Infusion 1 mG/Min (7.5 mL/Hr) IV Continuous <Continuous>  metoprolol tartrate 12.5 milliGRAM(s) Oral two times a day  nystatin Cream 1 Application(s) Topical two times a day    MEDICATIONS  (PRN):  acetaminophen   Tablet .. 650 milliGRAM(s) Oral every 6 hours PRN Temp greater or equal to 38C (100.4F), Moderate Pain (4 - 6)

## 2020-07-17 NOTE — DIETITIAN INITIAL EVALUATION ADULT. - PHYSICAL APPEARANCE
BMI 29.6/other (specify) Stage II pressure injury to right malleolus per documentation  4+ scrotal edema noted

## 2020-07-17 NOTE — DIETITIAN INITIAL EVALUATION ADULT. - MALNUTRITION
limited NFPE: moderate muscle loss of temples, clavicles, shoulders; moderate fat loss of orbitals moderate, chronic

## 2020-07-17 NOTE — PATIENT PROFILE ADULT - NSPRONUTRITIONRISK_GEN_A_NUR
Significant decrease of oral intake greater than 3 days prior to admission/Pressure injury stage 2 or greater

## 2020-07-17 NOTE — PROGRESS NOTE ADULT - ASSESSMENT
HPI/INTERVAL EVENTS: feels better, sinus rhythm on monitor (sinus bradycardia, 40s)    EXAM:   NAD, alert, oriented  MMM, no JVD  reg rhythm, sinus jadyn  lungs clear  abd soft  trace edema  good cap refill    RADIOLOGY REVIEWED:  cxr clear    IMPRESSION/ASSESSMENT & PLAN:   78 yo male with hx of CAD, CVA, HTN, HLD, ischemic cardiomyopathy, PCI in June 2020 and discharged with life vest, presented to the hospital with sustained VT, successfully chemically cardioverted with lidocaine.      Ventricular Tachycardia  - continue IV lidocaine pending EP eval, will likely switch to mexiletine   - continue PO amiodarone  - will likely need AICD at some point    CHFrEF/ ischemic cardiomyopathy  - echo pending  - ASA/Plavix/Statin  - restart low dose BB with holding parameters   - holding Entresto due to CARLA    DVT prophylaxis: heparin sc  Diet: Dash TLC  Activity: oob HPI/INTERVAL EVENTS: feels better, sinus rhythm on monitor (sinus bradycardia, 40s)    EXAM:   NAD, alert, oriented  MMM, no JVD  reg rhythm, sinus jadyn  lungs clear  abd soft  trace edema  good cap refill    RADIOLOGY REVIEWED:  cxr clear    IMPRESSION/ASSESSMENT & PLAN:   78 yo male with hx of CAD, CVA, HTN, HLD, ischemic cardiomyopathy, PCI in June 2020 and discharged with life vest, presented to the hospital with sustained VT, successfully chemically cardioverted with lidocaine.      Ventricular Tachycardia  - continue IV lidocaine pending EP eval, will likely switch to mexiletine   - continue PO amiodarone  - will likely need AICD at some point    CHFrEF/ ischemic cardiomyopathy  - echo pending  - ASA/Plavix/Statin  - restart low dose BB with holding parameters   - holding Entresto due to CARLA    Spoke with cardiology (Dr Agudelo, [facility] cardiology)    DVT prophylaxis: heparin sc  Diet: Dash TLC  Activity: oob

## 2020-07-17 NOTE — PROGRESS NOTE ADULT - SUBJECTIVE AND OBJECTIVE BOX
Cardiology Nurse Practitioner NOte  Pre preocedure    79 year old male with h/o HTN, CVA with hemiparesis, CAD with recent oLAD stent (6/2020) and known ischemic cardiomyopathy (EF <20%) who wears a life vest which was alarming.  Pt was found to go in and out of long runs of Vtach.  FOr [facility] to assess LAD stent.     VSS  Neuro: A&O X3, hemiparesis  Lungs: CTA  CV: SB  Ext: + palp pulses X4    ASA 3  [place] 2  [place]lee[place] risk 2.0%    No hydration d/t low EF  ASA/plavix given today

## 2020-07-17 NOTE — DIETITIAN INITIAL EVALUATION ADULT. - OTHER INFO
79 year old male PMH HTN, HLD, CAD, OA, CVA (10 years ago) now wheel chair bound from OA/CVA, admitted June 2020 for SVT that progressed to VT was successfully cardioverted, new onset ischemic cardiomyopathy s/p [facility] and stent placement in 6/2020 presents to the ED c/o weakness and episodes of unresponsiveness beginning today. In ED pt went unresponsive in VT was given total 100 of lido and started on amio gtt, BP dropped ton SBP 60's then after maintaining NSR SBPin 80's, pt given gentle bolus of 500cc. Pt had multiple episodes of VT in ED while on amio gtt, pt converted spontaneously twice then was given another 50 of lidocaine, and started on Liodcaine gtt. Pt BP remained normotensive throughout and, pt was asymptomatic. Pt admitted to the ICU for VT/SVT, ischemic cardiomyopathy HFrEF <20% from previous admission,  CARLA pre-renal vs. ATN. Attempted to interview x 2, pt sleeping soundly then in cath lab. Consult received for decreased po intake PTA and pressure injury. Noted with stage II pressure injury to lateral ankle. Pt has been NPO since MN.

## 2020-07-18 NOTE — PROGRESS NOTE ADULT - SUBJECTIVE AND OBJECTIVE BOX
On Admission  07-16-20 (2d)  HPI:  80 yo M PMH HTN, HLD, CAD, OA, CVA (10 years ago) now wheel chair bound from OA/CVA, admitted June 2020 for SVT that progressed to VT was successfully cardioverted, new onset ischemic cardiomyopathy s/p [facility] and stent placement in 6/2020 presents to the ED c/o weakness and episodes of unresponsiveness beginning today.  He reports feeling unwell for the past several days, then at approx noon today he began to feel very weak and reports having several episodes of passing out.  He wears a LifeVest and states the alarm went off several times today, he kept pressing a button to turn it off and then the vest would alarm again several minutes after.  He then decided to call 911, and took the LifeVest off at home    In ED pt went unresponsive in VT was given total 100 of lido and started on amio gtt, BP dropped ton SBP 60's then after maintaining NSR SBPin 80's, pt given gentle bolus of 500cc. Pt had multiple episodes of VT in ED while on amio gtt, pt converted spontaneously twice then was given another 50 of lidocaine, and started on Liodcaine gtt. Pt BP remained normotensive throughout and, pt was asymptomatic.     Multiple calls made out to cards/EPservice pending eval, pads and zol at bedside will likely need device placed in AM. Pt admitted to the ICU for VT/SVT, ischemic cardiomyopathy HFrEF <20% from previous admission,  CARLA pre-renal vs. 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 650 milliGRAM(s) Oral every 6 hours PRN  atorvastatin 20 milliGRAM(s) Oral at bedtime  buPROPion XL . 150 milliGRAM(s) Oral daily  chlorhexidine 4% Liquid 1 Application(s) Topical <User Schedule>  nystatin Cream 1 Application(s) Topical two times a day  traMADol 50 milliGRAM(s) Oral three times a day PRN      Drug Dosing Weight  Height (cm): 182.9 (17 Jul 2020 01:09)  Weight (kg): 99 (17 Jul 2020 01:09)  BMI (kg/m2): 29.6 (17 Jul 2020 01:09)  BSA (m2): 2.21 (17 Jul 2020 01:09)    T(C): 36.4 (07-18-20 @ 07:42), Max: 36.7 (07-17-20 @ 11:00)  HR: 55 (07-18-20 @ 09:00)  BP: 131/69 (07-18-20 @ 09:00)  BP(mean): 93 (07-18-20 @ 09:00)  ABP: --  ABP(mean): --  RR: 19 (07-18-20 @ 09:00)  SpO2: 95% (07-18-20 @ 09:00)          07-17 @ 07:01  -  07-18 @ 07:00  --------------------------------------------------------  IN: 397.5 mL / OUT: 0 mL / NET: 397.5 mL              LABS:  CBC Full  -  ( 18 Jul 2020 04:39 )  WBC Count : 6.86 K/uL  RBC Count : 3.35 M/uL  Hemoglobin : 10.9 g/dL  Hematocrit : 32.2 %  Platelet Count - Automated : 146 K/uL  Mean Cell Volume : 96.1 fl  Mean Cell Hemoglobin : 32.5 pg  Mean Cell Hemoglobin Concentration : 33.9 gm/dL  Auto Neutrophil # : x  Auto Lymphocyte # : x  Auto Monocyte # : x  Auto Eosinophil # : x  Auto Basophil # : x  Auto Neutrophil % : x  Auto Lymphocyte % : x  Auto Monocyte % : x  Auto Eosinophil % : x  Auto Basophil % : x    07-18    134<L>  |  101  |  27.0<H>  ----------------------------<  78  4.2   |  23.0  |  0.91    Ca    8.5<L>      18 Jul 2020 04:39  Phos  3.0     07-18  Mg     1.8     07-18    TPro  5.3<L>  /  Alb  3.2<L>  /  TBili  1.0  /  DBili  x   /  AST  11  /  ALT  7   /  AlkPhos  62  07-17    PT/INR - ( 16 Jul 2020 21:38 )   PT: 13.7 sec;   INR: 1.19 ratio         PTT - ( 16 Jul 2020 21:38 )  PTT:26.8 sec      ____________________________________________________________________________________________________

## 2020-07-18 NOTE — CONSULT NOTE ADULT - SUBJECTIVE AND OBJECTIVE BOX
HPI:  78 yo M PMH HTN, HLD, CAD, OA, CVA (10 years ago) now wheel chair bound from OA/CVA, admitted June 2020 for SVT that progressed to VT was successfully cardioverted, new onset ischemic cardiomyopathy s/p LHC and stent placement in 6/2020, presented this admission to the ED c/o weakness and episodes of unresponsiveness after having days of weekness and recurrent syncope. He had been wearing a LifeVest and noted multiple alarms.     In ED pt went unresponsive in VT was given total 100 of lido and started on amio gtt, BP dropped ton SBP 60's then after maintaining NSR SBPin 80's, pt given gentle bolus of 500cc. Pt had multiple episodes of VT in ED while on amio gtt, pt converted spontaneously twice then was given another 50 of lidocaine, and started on Liodcaine gtt. Pt BP remained normotensive throughout and, pt was asymptomatic.     During this admission he underwent another PCI. However, he continues to have recurrent episodes of monomorphic VT despite amiodarone and IV lidocaine. He denies significant symptoms while in bed, but has had recurrnet dizziness and syncope as above. he denies CP.     PAST MEDICAL & SURGICAL HISTORY:  Stroke  Osteoarthritis  Hyperlipidemia  Depression  HTN (hypertension)  S/P stroke due to cerebrovascular disease      REVIEW OF SYSTEMS  dizziness as above. denies CP, sob, fevers, chills. otherwise negative other than above.     MEDICATIONS  (STANDING):  aMIOdarone    Tablet 200 milliGRAM(s) Oral two times a day  aspirin  chewable 81 milliGRAM(s) Oral daily  atorvastatin 20 milliGRAM(s) Oral at bedtime  buPROPion XL . 150 milliGRAM(s) Oral daily  chlorhexidine 4% Liquid 1 Application(s) Topical <User Schedule>  clopidogrel Tablet 75 milliGRAM(s) Oral daily  heparin   Injectable 5000 Unit(s) SubCutaneous every 8 hours  lidocaine   Infusion 2 mG/Min (15 mL/Hr) IV Continuous <Continuous>  lisinopril 5 milliGRAM(s) Oral daily  metoprolol tartrate 12.5 milliGRAM(s) Oral two times a day  mexiletine 200 milliGRAM(s) Oral every 12 hours  nystatin Cream 1 Application(s) Topical two times a day    MEDICATIONS  (PRN):  acetaminophen   Tablet .. 650 milliGRAM(s) Oral every 6 hours PRN Temp greater or equal to 38C (100.4F), Moderate Pain (4 - 6)  traMADol 50 milliGRAM(s) Oral three times a day PRN Moderate Pain (4 - 6)      Allergies    No Known Allergies    Intolerances        SOCIAL HISTORY: per admission note    FAMILY HISTORY:  Family history unknown      Vital Signs Last 24 Hrs  T(C): 36.4 (18 Jul 2020 07:42), Max: 36.7 (17 Jul 2020 11:00)  T(F): 97.5 (18 Jul 2020 07:42), Max: 98 (17 Jul 2020 11:00)  HR: 55 (18 Jul 2020 09:00) (45 - 62)  BP: 131/69 (18 Jul 2020 09:00) (88/51 - 163/108)  BP(mean): 93 (18 Jul 2020 09:00) (62 - 130)  RR: 19 (18 Jul 2020 09:00) (9 - 42)  SpO2: 95% (18 Jul 2020 09:00) (95% - 100%)    Physical Exam:  Constitutional: AAOx3, NAD  Neck: supple, No JVD  Cardiovascular: +S1S2 RRR, no murmurs, rubs, gallops   Pulmonary: CTA b/l, unlabored, no wheezes, rales. rhonci  Abdomen: +BS, soft NTND  Extremities: no edema b/l, +distal pulses b/l  Neuro: non focal, speech clear, SABA x 4    LABS:                        10.9   6.86  )-----------( 146      ( 18 Jul 2020 04:39 )             32.2   07-18    134<L>  |  101  |  27.0<H>  ----------------------------<  78  4.2   |  23.0  |  0.91    Ca    8.5<L>      18 Jul 2020 04:39  Phos  3.0     07-18  Mg     1.8     07-18    TPro  5.3<L>  /  Alb  3.2<L>  /  TBili  1.0  /  DBili  x   /  AST  11  /  ALT  7   /  AlkPhos  62  07-17  LIVER FUNCTIONS - ( 17 Jul 2020 05:49 )  Alb: 3.2 g/dL / Pro: 5.3 g/dL / ALK PHOS: 62 U/L / ALT: 7 U/L / AST: 11 U/L / GGT: x           PT/INR - ( 16 Jul 2020 21:38 )   PT: 13.7 sec;   INR: 1.19 ratio         PTT - ( 16 Jul 2020 21:38 )  PTT:26.8 secCARDIAC MARKERS ( 17 Jul 2020 05:49 )  x     / 0.01 ng/mL / x     / x     / x      CARDIAC MARKERS ( 16 Jul 2020 21:38 )  x     / <0.01 ng/mL / x     / x     / x              RADIOLOGY & ADDITIONAL STUDIES:  < from: TTE Echo Complete w/o Contrast w/ Doppler (07.17.20 @ 09:24) >   1. Endocardial visualization was enhanced withintravenous echo contrast.   2. Mildly enlarged left atrium.   3. Left ventricular ejection fraction, by visual estimation, is <20%.   4. The right atrium is normal in size.   5. The right ventricular size is mildly enlarged. Moderately reduced RV systolic function.   6. No significant valvular abnomality.   7. There is no evidence of pericardial effusion.   8. Dilatation of the aortic root and sinuses of Valsalva.   9. Comapred to study from 6/2020, there is no significant change in LV function.    < end of copied text >  < from: TTE Echo Complete w/o Contrast w/ Doppler (07.17.20 @ 09:24) >      < end of copied text >  < from: Cardiac Cath Lab - Adult (07.17.20 @ 12:22) >  LM:   --  Mid left main: There was a 30 % stenosis.  --  Distal left main: Angiography showed minor luminal irregularities with  no flow limiting lesions.  LAD:   --  Proximal LAD: There was a 0 % stenosis at the site of a prior  stent.  --  Mid LAD: There was a 70 % stenosis.  CX:   --  Proximal circumflex: Angiography showed minor luminal  irregularities with no flow limiting lesions.  --  Mid circumflex: Angiography showed minor luminal irregularities with no  flow limiting lesions.  --  Distal circumflex: Angiography showed minor luminal irregularities with  no flow limiting lesions.  --  L AV groove: Angiography showed minor luminal irregularities with no  flow limiting lesions.  RCA:   --  Proximal RCA: Angiography showed minor luminal irregularities  with no flow limiting lesions.  --  Mid RCA: There was a 40 % stenosis.    < end of copied text >

## 2020-07-18 NOTE — PROGRESS NOTE ADULT - SUBJECTIVE AND OBJECTIVE BOX
Department of Cardiology                                                                  [facility] [address] [address] [facility]                                                            Telephone: [phone]. Fax:[phone]                                                                           Cardiac Catheterization Note       Subjective:  79y  Male past medical history significant for CVA, ICM with monomorphic VT one month ago s/p PCI to ostial LAD  presents with recurrent VT.  s/p Cardiac cath and LAD stent done via RRA     Site Check Right radial site : no bleeding, fingers warm with good cap refill            PAST MEDICAL & SURGICAL HISTORY:  Stroke  Osteoarthritis  Hyperlipidemia  Depression  HTN (hypertension)  S/P stroke due to cerebrovascular disease        Objective:  Vital Signs Last 24 Hrs  T(C): 36.4 (18 Jul 2020 07:42), Max: 36.6 (17 Jul 2020 15:38)  T(F): 97.5 (18 Jul 2020 07:42), Max: 97.9 (18 Jul 2020 04:16)  HR: 65 (18 Jul 2020 12:00) (45 - 65)  BP: 134/97 (18 Jul 2020 12:00) (88/51 - 163/108)  BP(mean): 111 (18 Jul 2020 12:00) (62 - 130)  RR: 31 (18 Jul 2020 12:00) (9 - 42)  SpO2: 100% (18 Jul 2020 12:00) (95% - 100%)    exam  Neuro: A&OX  HEENT: NC, AT  Lungs: CTA B/L  CV: S1, S2, no murmur,   Abd: Soft  Extremity: Right radial site : no bleeding, fingers warm with good cap refill             LABS               10.9   6.86  )-----------( 146      ( 18 Jul 2020 04:39 )             32.2     07-18    134<L>  |  101  |  27.0<H>  ----------------------------<  78  4.2   |  23.0  |  0.91    Ca    8.5<L>      18 Jul 2020 04:39  Phos  3.0     07-18  Mg     1.8     07-18    TPro  5.3<L>  /  Alb  3.2<L>  /  TBili  1.0  /  DBili  x   /  AST  11  /  ALT  7   /  AlkPhos  62  07-17    PT/INR - ( 16 Jul 2020 21:38 )   PT: 13.7 sec;   INR: 1.19 ratio         PTT - ( 16 Jul 2020 21:38 )  PTT:26.8 sec    Neuro: Awake and alert   Cath site R radial site + pp no evidence of bleeding fingers warm and mobile good capillary refill   a/pWrist precautions

## 2020-07-18 NOTE — PROGRESS NOTE ADULT - SUBJECTIVE AND OBJECTIVE BOX
[facility], [facility]                                   [address] [place] [address]                                                      PHONE: [phone]                                                         FAX: [phone]  [email or web address]  ---------------------------------------------------------------------------------------------------------------------------------    Overnight events/patient complaints: Patient feeling well.  No chest pain or dyspnea.  He had episodes of sustained VT overnight.  Lidocaine drip was increased.       PAST MEDICAL & SURGICAL HISTORY:  Stroke  Osteoarthritis  Hyperlipidemia  Depression  HTN (hypertension)  S/P stroke due to cerebrovascular disease      No Known Allergies    MEDICATIONS  (STANDING):  aMIOdarone    Tablet 200 milliGRAM(s) Oral two times a day  aspirin  chewable 81 milliGRAM(s) Oral daily  atorvastatin 20 milliGRAM(s) Oral at bedtime  buPROPion XL . 150 milliGRAM(s) Oral daily  chlorhexidine 4% Liquid 1 Application(s) Topical <User Schedule>  clopidogrel Tablet 75 milliGRAM(s) Oral daily  heparin   Injectable 5000 Unit(s) SubCutaneous every 8 hours  lidocaine   Infusion 2 mG/Min (15 mL/Hr) IV Continuous <Continuous>  lisinopril 5 milliGRAM(s) Oral daily  metoprolol tartrate 12.5 milliGRAM(s) Oral two times a day  mexiletine 200 milliGRAM(s) Oral every 12 hours  nystatin Cream 1 Application(s) Topical two times a day    MEDICATIONS  (PRN):  acetaminophen   Tablet .. 650 milliGRAM(s) Oral every 6 hours PRN Temp greater or equal to 38C (100.4F), Moderate Pain (4 - 6)  traMADol 50 milliGRAM(s) Oral three times a day PRN Moderate Pain (4 - 6)      Vital Signs Last 24 Hrs  T(C): 36.4 (18 Jul 2020 07:42), Max: 36.7 (17 Jul 2020 11:00)  T(F): 97.5 (18 Jul 2020 07:42), Max: 98 (17 Jul 2020 11:00)  HR: 55 (18 Jul 2020 09:00) (45 - 62)  BP: 131/69 (18 Jul 2020 09:00) (88/51 - 163/108)  BP(mean): 93 (18 Jul 2020 09:00) (62 - 130)  RR: 19 (18 Jul 2020 09:00) (9 - 42)  SpO2: 95% (18 Jul 2020 09:00) (95% - 100%)  ICU Vital Signs Last 24 Hrs  SASKIA ELIAS  I&O's Detail    17 Jul 2020 07:01  -  18 Jul 2020 07:00  --------------------------------------------------------  IN:    lidocaine   Infusion: 225 mL    lidocaine   Infusion: 15 mL    lidocaine   Infusion: 97.5 mL    Oral Fluid: 60 mL  Total IN: 397.5 mL    OUT:  Total OUT: 0 mL    Total NET: 397.5 mL      18 Jul 2020 07:01  -  18 Jul 2020 10:02  --------------------------------------------------------  IN:    lidocaine   Infusion: 30 mL    Oral Fluid: 250 mL  Total IN: 280 mL    OUT:  Total OUT: 0 mL    Total NET: 280 mL        I&O's Summary    17 Jul 2020 07:01  -  18 Jul 2020 07:00  --------------------------------------------------------  IN: 397.5 mL / OUT: 0 mL / NET: 397.5 mL    18 Jul 2020 07:01  -  18 Jul 2020 10:02  --------------------------------------------------------  IN: 280 mL / OUT: 0 mL / NET: 280 mL      Drug Dosing Weight  SASKIA ELIAS      PHYSICAL EXAM:  General: Appears well developed, well nourished alert and cooperative.  HEENT: Head; normocephalic, atraumatic.  Eyes: Pupils reactive, cornea wnl.  Neck: Supple, no nodes adenopathy, no NVD or carotid bruit or thyromegaly.  CARDIOVASCULAR: Normal S1 and S2, No murmur, rub, gallop or lift.   LUNGS: No rales, rhonchi or wheeze. Normal breath sounds bilaterally.  ABDOMEN: Soft, nontender without mass or organomegaly. bowel sounds normoactive.  EXTREMITIES: No clubbing, cyanosis or edema. Distal pulses wnl.   SKIN: warm and dry with normal turgor.  NEURO: Alert/oriented x 3  PSYCH: normal affect.        LABS:                        10.9   6.86  )-----------( 146      ( 18 Jul 2020 04:39 )             32.2     07-18    134<L>  |  101  |  27.0<H>  ----------------------------<  78  4.2   |  23.0  |  0.91    Ca    8.5<L>      18 Jul 2020 04:39  Phos  3.0     07-18  Mg     1.8     07-18    TPro  5.3<L>  /  Alb  3.2<L>  /  TBili  1.0  /  DBili  x   /  AST  11  /  ALT  7   /  AlkPhos  62  07-17    SASKIA ELIAS  CARDIAC MARKERS ( 17 Jul 2020 05:49 )  x     / 0.01 ng/mL / x     / x     / x      CARDIAC MARKERS ( 16 Jul 2020 21:38 )  x     / <0.01 ng/mL / x     / x     / x          PT/INR - ( 16 Jul 2020 21:38 )   PT: 13.7 sec;   INR: 1.19 ratio         PTT - ( 16 Jul 2020 21:38 )  PTT:26.8 sec      RADIOLOGY & ADDITIONAL STUDIES:    INTERPRETATION OF TELEMETRY (personally reviewed): sustain VT with LBBB    ECG:     ECHO:    STRESS TEST:    CARDIAC CATHETERIZATION:    ASSESSMENT AND PLAN:  In summary, SASKIA ELIAS is an 79y Male with past medical history significant for CVA, ICM with monomorphic VT one month ago s/p PCI to ostial lad presents with recurrent VT.    VT- most likely scar mediated.  Patient to get ICD.  Agree with lidocaine gtt.  If he has more VT, would rebolus and load with IV amiodarone.    EP consult for VT ablation if patient cannot be controlled medically.    HTN- well controlled    HLD- continue statin therapy    CAD- continue asa, plavix, statin, toprol    Thank you for allowing [facility] to participate in the care of this patient.  Please feel free to call with any questions or concerns.

## 2020-07-18 NOTE — PROGRESS NOTE ADULT - ATTENDING COMMENTS
CC time spent administering and adjusting antiarrhythmics, interpreting rhythm strips, adjusting other therapies, in this patient with life threatening arrhythmias.

## 2020-07-18 NOTE — CONSULT NOTE ADULT - ASSESSMENT
79y Male with past medical history significant for CVA, ICM with monomorphic VT one month ago s/p PCI to ostial lad presents with recurrent VT. He underwent another PCI to mLAD this admission, but contineus to have episodes of prolonged VT. He had recurrent syncope in this setting. Has severe LV dysfunction likely related to ischemic cardiomyopathy.   VT has LB, inferior axis, mostly one morphology noted on telemetry.   Given recurrent sustained VT despite two antiarrhythmic medications, will likely need VT ablation for arrhythmia control. Will also need secondary prevention ICD, but preferable to perform VT ablation first.   discussed this in detail with primary team and the patient, including procedure related risks. Pt states he can not go one with recurrent episodes and concern for ICD therapy, and does want to proceed with VT ablation as soon as possible.   will plan VT ablation for early this week. Given this, would hold amiodarone and mexilitine, can continue IV lidocaine for now.   consider cardiac MRI as well prior to procedures.

## 2020-07-19 NOTE — PROGRESS NOTE ADULT - ASSESSMENT
HPI/INTERVAL EVENTS: less VT overnight     EXAM:   NAD, alert, oriented  MMM, no JVD  reg rhythm, sinus jadyn  lungs clear  abd soft  trace edema  good cap refill    RADIOLOGY REVIEWED:  cxr clear    IMPRESSION/ASSESSMENT & PLAN:   78 yo male with hx of CAD, CVA, HTN, HLD, ischemic cardiomyopathy, PCI in June 2020 and discharged with life vest, presented to the hospital with sustained VT, successfully chemically cardioverted.  Underwent LHC this admission (7/17) with BENSON placement to LAD.  Now awaiting AICD placement and possible VT ablation.     Ventricular Tachycardia  - lidocaine infusion at 3mg/hr  - still on PO amiodarone but it may be stopped by EP in preparation for VT ablation   Plan for AICD  EP and cardiology following     CHFrEF/ ischemic cardiomyopathy  - ASA/Plavix/Statin  - low dose BB with holding parameters   - restarted on low dose ACE    DVT prophylaxis: heparin sc  Diet: Dash TLC  Activity: oob

## 2020-07-19 NOTE — PROGRESS NOTE ADULT - SUBJECTIVE AND OBJECTIVE BOX
pt feeling well with no complaints today.   in SR, no recurrent VT overnight. amiodarone held as of this am.       MEDICATIONS  (STANDING):  aspirin  chewable 81 milliGRAM(s) Oral daily  atorvastatin 20 milliGRAM(s) Oral at bedtime  buPROPion XL . 150 milliGRAM(s) Oral daily  chlorhexidine 4% Liquid 1 Application(s) Topical <User Schedule>  clopidogrel Tablet 75 milliGRAM(s) Oral daily  heparin   Injectable 5000 Unit(s) SubCutaneous every 8 hours  lidocaine   Infusion 3 mG/Min (22.5 mL/Hr) IV Continuous <Continuous>  lisinopril 5 milliGRAM(s) Oral daily  melatonin 5 milliGRAM(s) Oral at bedtime  metoprolol tartrate 12.5 milliGRAM(s) Oral two times a day  nystatin Cream 1 Application(s) Topical two times a day    MEDICATIONS  (PRN):  acetaminophen   Tablet .. 650 milliGRAM(s) Oral every 6 hours PRN Temp greater or equal to 38C (100.4F), Moderate Pain (4 - 6)  traMADol 50 milliGRAM(s) Oral three times a day PRN Moderate Pain (4 - 6)      Allergies    No Known Allergies    Intolerances      PAST MEDICAL & SURGICAL HISTORY:  Stroke  Osteoarthritis  Hyperlipidemia  Depression  HTN (hypertension)  S/P stroke due to cerebrovascular disease      Vital Signs Last 24 Hrs  T(C): 36.4 (19 Jul 2020 07:56), Max: 36.7 (18 Jul 2020 20:04)  T(F): 97.5 (19 Jul 2020 07:56), Max: 98 (18 Jul 2020 20:04)  HR: 57 (19 Jul 2020 10:00) (50 - 65)  BP: 131/75 (19 Jul 2020 10:00) (101/59 - 157/72)  BP(mean): 89 (19 Jul 2020 10:00) (72 - 112)  RR: 30 (19 Jul 2020 10:00) (15 - 42)  SpO2: 94% (19 Jul 2020 10:00) (93% - 100%)    Physical Exam:  Constitutional: NAD, AAOx3  Cardiovascular: +S1S2 RRR  Pulmonary: CTA b/l, unlabored  GI: soft NTND +BS  Extremities: no pedal edema, +distal pulses b/l  Neuro: non focal, SABA x4    LABS:                        11.1   7.30  )-----------( 147      ( 19 Jul 2020 06:45 )             32.8     07-19    133<L>  |  100  |  24.0<H>  ----------------------------<  74  4.4   |  22.0  |  0.74    Ca    8.3<L>      19 Jul 2020 06:45  Phos  2.8     07-19  Mg     1.8     07-19            RADIOLOGY & ADDITIONAL TESTS:  < from: TTE Echo Complete w/o Contrast w/ Doppler (07.17.20 @ 09:24) >   1. Endocardial visualization was enhanced withintravenous echo contrast.   2. Mildly enlarged left atrium.   3. Left ventricular ejection fraction, by visual estimation, is <20%.   4. The right atrium is normal in size.   5. The right ventricular size is mildly enlarged. Moderately reduced RV systolic function.   6. No significant valvular abnomality.   7. There is no evidence of pericardial effusion.   8. Dilatation of the aortic root and sinuses of Valsalva.   9. Comapred to study from 6/2020, there is no significant change in LV function.    < end of copied text >  < from: Cardiac Cath Lab - Adult (07.17.20 @ 12:22) >  DIAGNOSTIC IMPRESSIONS: Patent ostial LAD stent.  IVUS and iFR of LAD shoed 70% mid LAD stenosis with positive iFR s/p IVUS  assisted PTCA and 3.0 x 34 BENSON postdilated proximally to 4.5mm.  DIAGNOSTIC RECOMMENDATIONS: Medical therapy for CAD    < end of copied text >

## 2020-07-19 NOTE — PROGRESS NOTE ADULT - SUBJECTIVE AND OBJECTIVE BOX
Patient notified and scheduling an appointment [facility], THE [facility] C[facility]                                   [address] [place] [address]                                                      PHONE: [phone]                                                         FAX: [phone]  [email or web address]  ---------------------------------------------------------------------------------------------------------------------------------    Overnight events/patient complaints: Patient feeling well.  Bradycardia overnight but no more episodes of VT>  Oral antiarrhythmics stopped to prepare for VT ablation      PAST MEDICAL & SURGICAL HISTORY:  Stroke  Osteoarthritis  Hyperlipidemia  Depression  HTN (hypertension)  S/P stroke due to cerebrovascular disease      No Known Allergies    MEDICATIONS  (STANDING):  aspirin  chewable 81 milliGRAM(s) Oral daily  atorvastatin 20 milliGRAM(s) Oral at bedtime  buPROPion XL . 150 milliGRAM(s) Oral daily  chlorhexidine 4% Liquid 1 Application(s) Topical <User Schedule>  clopidogrel Tablet 75 milliGRAM(s) Oral daily  heparin   Injectable 5000 Unit(s) SubCutaneous every 8 hours  lidocaine   Infusion 3 mG/Min (22.5 mL/Hr) IV Continuous <Continuous>  lisinopril 5 milliGRAM(s) Oral daily  melatonin 5 milliGRAM(s) Oral at bedtime  metoprolol tartrate 12.5 milliGRAM(s) Oral two times a day  nystatin Cream 1 Application(s) Topical two times a day    MEDICATIONS  (PRN):  acetaminophen   Tablet .. 650 milliGRAM(s) Oral every 6 hours PRN Temp greater or equal to 38C (100.4F), Moderate Pain (4 - 6)  traMADol 50 milliGRAM(s) Oral three times a day PRN Moderate Pain (4 - 6)      Vital Signs Last 24 Hrs  T(C): 36.4 (19 Jul 2020 07:56), Max: 36.7 (18 Jul 2020 20:04)  T(F): 97.5 (19 Jul 2020 07:56), Max: 98 (18 Jul 2020 20:04)  HR: 59 (19 Jul 2020 09:00) (50 - 65)  BP: 117/60 (19 Jul 2020 09:00) (101/59 - 157/72)  BP(mean): 80 (19 Jul 2020 09:00) (72 - 112)  RR: 29 (19 Jul 2020 09:00) (15 - 42)  SpO2: 96% (19 Jul 2020 09:00) (93% - 100%)  ICU Vital Signs Last 24 Hrs  SASKIA ELIAS  I&O's Detail    18 Jul 2020 07:01  -  19 Jul 2020 07:00  --------------------------------------------------------  IN:    lidocaine   Infusion: 247.5 mL    lidocaine   Infusion: 187.5 mL    Oral Fluid: 500 mL  Total IN: 935 mL    OUT:  Total OUT: 0 mL    Total NET: 935 mL      19 Jul 2020 07:01  -  19 Jul 2020 10:14  --------------------------------------------------------  IN:    lidocaine   Infusion: 45 mL  Total IN: 45 mL    OUT:  Total OUT: 0 mL    Total NET: 45 mL        I&O's Summary    18 Jul 2020 07:01  -  19 Jul 2020 07:00  --------------------------------------------------------  IN: 935 mL / OUT: 0 mL / NET: 935 mL    19 Jul 2020 07:01  -  19 Jul 2020 10:14  --------------------------------------------------------  IN: 45 mL / OUT: 0 mL / NET: 45 mL      Drug Dosing Weight  SASKIA ELIAS      PHYSICAL EXAM:  General: Appears well developed, well nourished alert and cooperative.  HEENT: Head; normocephalic, atraumatic.  Eyes: Pupils reactive, cornea wnl.  Neck: Supple, no nodes adenopathy, no NVD or carotid bruit or thyromegaly.  CARDIOVASCULAR: bradycardic, irregular, noraml  S1 and S2, No murmur, rub, gallop or lift.   LUNGS: No rales, rhonchi or wheeze. Normal breath sounds bilaterally.  ABDOMEN: Soft, nontender without mass or organomegaly. bowel sounds normoactive.  EXTREMITIES: No clubbing, cyanosis or edema. Distal pulses wnl.   SKIN: warm and dry with normal turgor.  NEURO: Alert/oriented x 3  PSYCH: normal affect.        LABS:                        11.1   7.30  )-----------( 147      ( 19 Jul 2020 06:45 )             32.8     07-19    133<L>  |  100  |  24.0<H>  ----------------------------<  74  4.4   |  22.0  |  0.74    Ca    8.3<L>      19 Jul 2020 06:45  Phos  2.8     07-19  Mg     1.8     07-19      SASKIA ELIAS            RADIOLOGY & ADDITIONAL STUDIES:    INTERPRETATION OF TELEMETRY (personally reviewed): no episodes of VT    ECG:     ECHO:    STRESS TEST:    CARDIAC CATHETERIZATION:    ASSESSMENT AND PLAN:  In summary, SASKIA ELIAS is an 79y Male with past medical history significant for CVA, ICM with monomorphic VT one month ago s/p PCI to ostial lad presents with recurrent VT.    VT- most likely scar mediated.  Lidoaine drip.  No oral AEDs to prepare for VT ablation so that he can be inducible.  Would perform cardiac MRI tomorrow and plan for VT ablation early this week followed by ICD.     HTN- well controlled    HLD- continue statin therapy    CAD- continue asa, plavix, statin, toprol
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
## 2020-07-19 NOTE — PROGRESS NOTE ADULT - SUBJECTIVE AND OBJECTIVE BOX
On Admission  07-16-20 (3d)  HPI:  78 yo M PMH HTN, HLD, CAD, OA, CVA (10 years ago) now wheel chair bound from OA/CVA, admitted June 2020 for SVT that progressed to VT was successfully cardioverted, new onset ischemic cardiomyopathy s/p LHC and stent placement in 6/2020 presents to the ED c/o weakness and episodes of unresponsiveness beginning today.  He reports feeling unwell for the past several days, then at approx noon today he began to feel very weak and reports having several episodes of passing out.  He wears a LifeVest and states the alarm went off several times today, he kept pressing a button to turn it off and then the vest would alarm again several minutes after.  He then decided to call 911, and took the LifeVest off at home    In ED pt went unresponsive in VT was given total 100 of lido and started on amio gtt, BP dropped ton SBP 60's then after maintaining NSR SBPin 80's, pt given gentle bolus of 500cc. Pt had multiple episodes of VT in ED while on amio gtt, pt converted spontaneously twice then was given another 50 of lidocaine, and started on Liodcaine gtt. Pt BP remained normotensive throughout and, pt was asymptomatic.     Multiple calls made out to cards/EPservice pending eval, pads and zol at bedside will likely need device placed in AM. Pt admitted to the ICU for VT/SVT, ischemic cardiomyopathy HFrEF <20% from previous admission,  CARLA pre-renal vs. ATN (16 Jul 2020 23:19)    PAST MEDICAL & SURGICAL HISTORY:  Stroke  Osteoarthritis  Hyperlipidemia  Depression  HTN (hypertension)  S/P stroke due to cerebrovascular disease      Antimicrobial:    Cardiovascular:  aMIOdarone    Tablet 200 milliGRAM(s) Oral two times a day  lidocaine   Infusion 3 mG/Min IV Continuous <Continuous>  lisinopril 5 milliGRAM(s) Oral daily  metoprolol tartrate 12.5 milliGRAM(s) Oral two times a day    Pulmonary:    Hematalogic:  aspirin  chewable 81 milliGRAM(s) Oral daily  clopidogrel Tablet 75 milliGRAM(s) Oral daily  heparin   Injectable 5000 Unit(s) SubCutaneous every 8 hours    Other:  acetaminophen   Tablet .. 650 milliGRAM(s) Oral every 6 hours PRN  atorvastatin 20 milliGRAM(s) Oral at bedtime  buPROPion XL . 150 milliGRAM(s) Oral daily  chlorhexidine 4% Liquid 1 Application(s) Topical <User Schedule>  magnesium sulfate  IVPB 2 Gram(s) IV Intermittent once  melatonin 5 milliGRAM(s) Oral at bedtime  nystatin Cream 1 Application(s) Topical two times a day  traMADol 50 milliGRAM(s) Oral three times a day PRN      Drug Dosing Weight  Height (cm): 182.9 (17 Jul 2020 01:09)  Weight (kg): 99 (17 Jul 2020 01:09)  BMI (kg/m2): 29.6 (17 Jul 2020 01:09)  BSA (m2): 2.21 (17 Jul 2020 01:09)    T(C): 36.4 (07-19-20 @ 07:56), Max: 36.7 (07-18-20 @ 20:04)  HR: 53 (07-19-20 @ 07:00)  BP: 157/72 (07-19-20 @ 07:00)  BP(mean): 103 (07-19-20 @ 07:00)  ABP: --  ABP(mean): --  RR: 24 (07-19-20 @ 07:00)  SpO2: 100% (07-19-20 @ 07:00)          07-18 @ 07:01  -  07-19 @ 07:00  --------------------------------------------------------  IN: 935 mL / OUT: 0 mL / NET: 935 mL              LABS:  CBC Full  -  ( 19 Jul 2020 06:45 )  WBC Count : 7.30 K/uL  RBC Count : 3.41 M/uL  Hemoglobin : 11.1 g/dL  Hematocrit : 32.8 %  Platelet Count - Automated : 147 K/uL  Mean Cell Volume : 96.2 fl  Mean Cell Hemoglobin : 32.6 pg  Mean Cell Hemoglobin Concentration : 33.8 gm/dL  Auto Neutrophil # : x  Auto Lymphocyte # : x  Auto Monocyte # : x  Auto Eosinophil # : x  Auto Basophil # : x  Auto Neutrophil % : x  Auto Lymphocyte % : x  Auto Monocyte % : x  Auto Eosinophil % : x  Auto Basophil % : x    07-19    133<L>  |  100  |  24.0<H>  ----------------------------<  74  4.4   |  22.0  |  0.74    Ca    8.3<L>      19 Jul 2020 06:45  Phos  2.8     07-19  Mg     1.8     07-19            ____________________________________________________________________________________________________

## 2020-07-19 NOTE — PROGRESS NOTE ADULT - ASSESSMENT
79y Male with past medical history significant for CVA, ICM with monomorphic VT one month ago s/p PCI to ostial lad presents with recurrent VT. He underwent another PCI to mLAD this admission, but contineus to have episodes of prolonged VT. He had recurrent syncope in this setting. Has severe LV dysfunction likely related to ischemic cardiomyopathy.   VT has LB, inferior axis, mostly one morphology noted on telemetry.   Given recurrent sustained VT despite two antiarrhythmic medications, will plan VT ablation for arrhythmia control. Will also need secondary prevention ICD, but preferable to perform VT ablation first.   discussed this in detail with primary team and the patient, including procedure related risks. Pt states he can not go one with recurrent episodes and concern for ICD therapy, and does want to proceed with VT ablation as soon as possible.   will plan VT ablation for early this week, possibly tomorrow  Hold amiodarone and mexilitine, can continue IV lidocaine for now.   consider cardiac MRI as well prior to procedures.

## 2020-07-20 NOTE — PROGRESS NOTE ADULT - ASSESSMENT
80 yo M PMH HTN, HLD, CAD, OA, CVA (10 years ago) now wheel chair bound from OA/CVA, admitted June 2020 for SVT that progressed to VT was successfully cardioverted, new onset ischemic cardiomyopathy s/p LHC and stent placement in 6/2020 presents to the ED c/o weakness and episodes of unresponsiveness, found to have:    1. Vtach  2. Ischemic cardiomyopathy    Plan:  -lidocaine ggt at 3mg/hr. no further episodes of VT overnight   - d/c PO amio  - cath lab for VT ablation today  - can transfer to floor post procedure if stable   - cont aspirin/plavix   - low dose BB  - diet when back  - s/p Zyprexa, Precedex for acute delirium 80 yo M PMH HTN, HLD, CAD, OA, CVA (10 years ago) now wheel chair bound from OA/CVA, admitted June 2020 for SVT that progressed to VT was successfully cardioverted, new onset ischemic cardiomyopathy s/p LHC and stent placement in 6/2020 presents to the ED c/o weakness and episodes of unresponsiveness, found to have:    1. Vtach  2. Ischemic cardiomyopathy    Plan:  -lidocaine ggt at 3mg/hr. no further episodes of VT overnight   - d/c PO amio  - cath lab for VT ablation today. AICD at a later date   - can transfer to floor post procedure if stable   - cont aspirin/plavix   - low dose BB  - diet when back  - s/p Zyprexa, Precedex for acute delirium 80 yo M PMH HTN, HLD, CAD, OA, CVA (10 years ago) now wheel chair bound from OA/CVA, admitted June 2020 for SVT that progressed to VT was successfully cardioverted, new onset ischemic cardiomyopathy s/p LHC and stent placement in 6/2020 presents to the ED c/o weakness and episodes of unresponsiveness, found to have:    1. Vtach  2. Ischemic cardiomyopathy    Plan:  - lidocaine ggt at 3mg/hr. no further episodes of VT overnight   - no longer on po amio  - cath lab for VT ablation today. AICD at a later date per EP   - can transfer to Tele post procedure if stable   - cont aspirin/plavix   - low dose BB  - diet when able  - s/p Zyprexa, Precedex for acute delirium last night --> now resolved 80 yo M PMH HTN, HLD, CAD, OA, CVA (10 years ago) now wheel chair bound from OA/CVA, admitted June 2020 for SVT that progressed to VT was successfully cardioverted, new onset ischemic cardiomyopathy s/p LHC and stent placement in 6/2020 presents to the ED c/o weakness and episodes of unresponsiveness, found to have:    1. Vtach  2. Ischemic cardiomyopathy    Plan:  - lidocaine ggt at 3mg/hr. no further episodes of VT overnight   - no longer on po amio  - cath lab for VT ablation today. AICD at a later date per EP   - can transfer to Tele post procedure if stable   - cont aspirin/plavix   - low dose BB  - diet when able  - s/p Zyprexa, Precedex for acute delirium last night --> now resolved         ADDENDUM  - pt hypotensive during Vt ablation  - required RIJ TVP  - Back-up pacing set at VVI 30  - now off pressors   - lactate normal  - for AICD in morning   - NPO after midnight   - to remain in ICU

## 2020-07-20 NOTE — PROGRESS NOTE ADULT - SUBJECTIVE AND OBJECTIVE BOX
On Admission  07-16-20 (4d)  HPI:  80 yo M PMH HTN, HLD, CAD, OA, CVA (10 years ago) now wheel chair bound from OA/CVA, admitted June 2020 for SVT that progressed to VT was successfully cardioverted, new onset ischemic cardiomyopathy s/p LHC and stent placement in 6/2020 presents to the ED c/o weakness and episodes of unresponsiveness beginning today.  He reports feeling unwell for the past several days, then at approx noon today he began to feel very weak and reports having several episodes of passing out.  He wears a LifeVest and states the alarm went off several times today, he kept pressing a button to turn it off and then the vest would alarm again several minutes after.  He then decided to call 911, and took the LifeVest off at home    In ED pt went unresponsive in VT was given total 100 of lido and started on amio gtt, BP dropped ton SBP 60's then after maintaining NSR SBPin 80's, pt given gentle bolus of 500cc. Pt had multiple episodes of VT in ED while on amio gtt, pt converted spontaneously twice then was given another 50 of lidocaine, and started on Liodcaine gtt. Pt BP remained normotensive throughout and, pt was asymptomatic.     Multiple calls made out to cards/EPservice pending eval, pads and zol at bedside will likely need device placed in AM. Pt admitted to the ICU for VT/SVT, ischemic cardiomyopathy HFrEF <20% from previous admission,  CARLA pre-renal vs. ATN (16 Jul 2020 23:19)    PAST MEDICAL & SURGICAL HISTORY:  Stroke  Osteoarthritis  Hyperlipidemia  Depression  HTN (hypertension)  S/P stroke due to cerebrovascular disease      Antimicrobial:    Cardiovascular:  furosemide   Injectable 20 milliGRAM(s) IV Push once  lisinopril 5 milliGRAM(s) Oral daily  metoprolol tartrate 12.5 milliGRAM(s) Oral two times a day    Pulmonary:    Hematalogic:  aspirin  chewable 81 milliGRAM(s) Oral daily  clopidogrel Tablet 75 milliGRAM(s) Oral daily    Other:  acetaminophen   Tablet .. 650 milliGRAM(s) Oral every 6 hours PRN  atorvastatin 20 milliGRAM(s) Oral at bedtime  buPROPion XL . 150 milliGRAM(s) Oral daily  chlorhexidine 4% Liquid 1 Application(s) Topical <User Schedule>  melatonin 5 milliGRAM(s) Oral at bedtime  nystatin Cream 1 Application(s) Topical two times a day  traMADol 50 milliGRAM(s) Oral three times a day PRN      Drug Dosing Weight  Height (cm): 182.9 (17 Jul 2020 01:09)  Weight (kg): 99 (17 Jul 2020 01:09)  BMI (kg/m2): 29.6 (17 Jul 2020 01:09)  BSA (m2): 2.21 (17 Jul 2020 01:09)    T(C): 36.4 (07-20-20 @ 09:11), Max: 37.6 (07-19-20 @ 19:58)  HR: 54 (07-20-20 @ 17:15)  BP: 144/74 (07-20-20 @ 17:15)  BP(mean): 103 (07-20-20 @ 17:15)  ABP: 123/58 (07-20-20 @ 16:35)  ABP(mean): --  RR: 22 (07-20-20 @ 17:15)  SpO2: 100% (07-20-20 @ 17:15)    ABG - ( 20 Jul 2020 16:11 )  pH, Arterial: 7.37  pH, Blood: x     /  pCO2: 38    /  pO2: 214   / HCO3: 22    / Base Excess: -2.8  /  SaO2: 100                   07-19 @ 07:01  -  07-20 @ 07:00  --------------------------------------------------------  IN: 555.8 mL / OUT: 0 mL / NET: 555.8 mL              LABS:  CBC Full  -  ( 20 Jul 2020 03:40 )  WBC Count : 6.84 K/uL  RBC Count : 3.48 M/uL  Hemoglobin : 11.2 g/dL  Hematocrit : 33.1 %  Platelet Count - Automated : 141 K/uL  Mean Cell Volume : 95.1 fl  Mean Cell Hemoglobin : 32.2 pg  Mean Cell Hemoglobin Concentration : 33.8 gm/dL  Auto Neutrophil # : x  Auto Lymphocyte # : x  Auto Monocyte # : x  Auto Eosinophil # : x  Auto Basophil # : x  Auto Neutrophil % : x  Auto Lymphocyte % : x  Auto Monocyte % : x  Auto Eosinophil % : x  Auto Basophil % : x    07-20    130<L>  |  98  |  21.0<H>  ----------------------------<  92  4.9   |  23.0  |  0.83    Ca    8.6      20 Jul 2020 03:40  Phos  3.2     07-20  Mg     1.9     07-20            ____________________________________________________________________________________________________

## 2020-07-20 NOTE — CHART NOTE - NSCHARTNOTEFT_GEN_A_CORE
Source: Patient [ ]  Family [ ]   other [x ]    Current Diet: Diet, Pureed:   Dysphagia 1, Pureed, Thin Liquids ([id number]) (07-18-20 @ 17:05)  Diet, NPO after Midnight:      NPO Start Date: 20-Jul-2020,   NPO Start Time: 23:59 (07-20-20 @ 14:16)    PO intake: po intake remains suboptimal    Current Weight:   (7/20) 228#  (7/19) 231#  (7/18) 238#  (7/17) 221#  -Obtain daily wts, continue to monitor. 1+ generalized edema, scrotum nonpitting edema noted per documentation.     Pertinent Medications: MEDICATIONS  (STANDING):  aspirin  chewable 81 milliGRAM(s) Oral daily  atorvastatin 20 milliGRAM(s) Oral at bedtime  buPROPion XL . 150 milliGRAM(s) Oral daily  chlorhexidine 4% Liquid 1 Application(s) Topical <User Schedule>  clopidogrel Tablet 75 milliGRAM(s) Oral daily  lisinopril 5 milliGRAM(s) Oral daily  melatonin 5 milliGRAM(s) Oral at bedtime  metoprolol tartrate 12.5 milliGRAM(s) Oral two times a day  nystatin Cream 1 Application(s) Topical two times a day    MEDICATIONS  (PRN):  acetaminophen   Tablet .. 650 milliGRAM(s) Oral every 6 hours PRN Temp greater or equal to 38C (100.4F), Moderate Pain (4 - 6)  traMADol 50 milliGRAM(s) Oral three times a day PRN Moderate Pain (4 - 6)    Pertinent Labs: CBC Full  -  ( 20 Jul 2020 03:40 )  WBC Count : 6.84 K/uL  RBC Count : 3.48 M/uL  Hemoglobin : 11.2 g/dL  Hematocrit : 33.1 %  Platelet Count - Automated : 141 K/uL  Mean Cell Volume : 95.1 fl  Mean Cell Hemoglobin : 32.2 pg  Mean Cell Hemoglobin Concentration : 33.8 gm/dL  Auto Neutrophil # : x  Auto Lymphocyte # : x  Auto Monocyte # : x  Auto Eosinophil # : x  Auto Basophil # : x  Auto Neutrophil % : x  Auto Lymphocyte % : x  Auto Monocyte % : x  Auto Eosinophil % : x  Auto Basophil % : x  07-20 Na130 mmol/L<L> Glu 92 mg/dL K+ 4.9 mmol/L Cr  0.83 mg/dL BUN 21.0 mg/dL<H> Phos 3.2 mg/dL Alb n/a   PAB n/a       Skin: R malleolus stage II, IAD    Nutrition focused physical exam previously conducted - found signs of malnutrition [ ]absent [ x]present    Subcutaneous fat loss: [x ] Orbital fat pads region, [ ]Buccal fat region, [ ]Triceps region,  [ ]Ribs region    Muscle wasting: [ x]Temples region, [ x]Clavicle region, [ x]Shoulder region, [ ]Scapula region, [ ]Interosseous region,  [ ]thigh region, [ ]Calf region    Estimated Needs:   [x ] no change since previous assessment  [ ] recalculated:     Current Nutrition Diagnosis: Pt remains at high nutrition risk and meets criteria for moderate, chronic malnutrition related to inability to meet sufficient protein-energy in setting of depression, CHF, and skin breakdown as evidenced by moderate muscle/fat loss and likely meeting <75% nutrient needs >1 mo. Pt in cath lab for VT ablation during assessment. Pt consuming <50% of meals per documentation. RD to remain available.     Recommendations:   1) Add Ensure Enlive TID to optimize po intake and provide an additional 350kcal, 20g protein per serving   2) RX: MVI, Vitamin C (500mg) daily  3) Monitor wts and labs    Monitoring and Evaluation:   [x ] PO intake [x ] Tolerance to diet prescription [X] Weights  [X] Follow up per protocol [X] Labs:

## 2020-07-20 NOTE — CHART NOTE - NSCHARTNOTEFT_GEN_A_CORE
Transvenous pacemaker seems to be pacing inappropriately.  Set to VVI, rate of 30.  Despite the patient's intrinsic rate being in 50s to 60s, the device is still occasionally pacing (often immediately after an intrinsic beat).  The sensitivity was turned up but this only partially corrected the problem.  CXR shows the lead in the RV.  Pacemaker was then turned off.  His intrinsic HR in the 60s.  Instructions left with nurse to immediately turn the device back on if patient's HR drops below mid 40s.  Patient is asymptomatic. Transvenous pacemaker seems to be pacing inappropriately.  Set to VVI, rate of 30.  Despite the patient's intrinsic rate being in 50s to 60s, the device is still occasionally pacing (often immediately after an intrinsic beat).  The sensitivity was turned up but this only partially corrected the problem.  CXR shows the lead in the RV.  Pacemaker was then turned off for fear of R on T precipitating an arrhythmia, his intrinsic HR remained in the 60s.  Instructions left with nurse to immediately turn the device back on if patient's HR drops below mid 50s.  Patient is asymptomatic.  Rhythm strip reviewed with Dr Ferris, agrees with plan.

## 2020-07-20 NOTE — PROGRESS NOTE ADULT - ASSESSMENT
HPI/INTERVAL EVENTS: s/p VT ablation today, now with TVP    EXAM:   NAD, alert, oriented  MMM  reg rhythm, sinus jadyn  lungs clear  abd soft  large scrotum  trace edema  good cap refill    IMPRESSION/ASSESSMENT & PLAN:   78 yo male with hx of CAD, CVA, HTN, HLD, ischemic cardiomyopathy, PCI in June 2020 and discharged with life vest, presented to the hospital with sustained VT, successfully chemically cardioverted.  Underwent LHC this admission (7/17) with BENSON placement to LAD.  VT ablation performed 7/20.    Ventricular Tachycardia  - awaiting AICD tomorrow    Bradycardia  - has transvenous pacemaker set to VVI with rate of 30, intrinsic rate in 40s-50s  - will remove after AICD    CHFrEF/ ischemic cardiomyopathy  - ASA/Plavix/Statin  - low dose BB with holding parameters   - on low dose ACE    DVT prophylaxis: scd  Diet: Dash TLC  Activity: oob

## 2020-07-20 NOTE — PROGRESS NOTE ADULT - SUBJECTIVE AND OBJECTIVE BOX
Patient is a 79y old  Male who presents with a chief complaint of VT (19 Jul 2020 10:57)      BRIEF HOSPITAL COURSE:   80 yo M PMH HTN, HLD, CAD, OA, CVA (10 years ago) now wheel chair bound from OA/CVA, admitted June 2020 for SVT that progressed to VT was successfully cardioverted, new onset ischemic cardiomyopathy s/p LHC and stent placement in 6/2020 presents to the ED c/o weakness and episodes of unresponsiveness beginning today.  He reports feeling unwell for the past several days, then at approx noon today he began to feel very weak and reports having several episodes of passing out.  He wears a LifeVest and states the alarm went off several times today, he kept pressing a button to turn it off and then the vest would alarm again several minutes after.  He then decided to call 911, and took the LifeVest off at home    In ED pt went unresponsive in VT was given total 100 of lido and started on amio gtt, BP dropped ton SBP 60's then after maintaining NSR SBPin 80's, pt given gentle bolus of 500cc. Pt had multiple episodes of VT in ED while on amio gtt, pt converted spontaneously twice then was given another 50 of lidocaine, and started on Liodcaine gtt. Pt BP remained normotensive throughout and, pt was asymptomatic.     Pt seen and examined denies any medical complaints     Events last 24 hours:   -cath lab for VT ablation   - lidocaine ggt     PAST MEDICAL & SURGICAL HISTORY:  Stroke  Osteoarthritis  Hyperlipidemia  Depression  HTN (hypertension)  S/P stroke due to cerebrovascular disease    Allergies    No Known Allergies    Intolerances      FAMILY HISTORY:  Family history unknown      Review of Systems:  CONSTITUTIONAL: No fever, chills, or fatigue  EYES: No eye pain, visual disturbances, or discharge  ENMT:  No difficulty hearing, tinnitus, vertigo; No sinus or throat pain  NECK: No pain or stiffness  RESPIRATORY: No cough, wheezing, chills or hemoptysis; No shortness of breath  CARDIOVASCULAR: No chest pain, palpitations, dizziness, or leg swelling  GASTROINTESTINAL: No abdominal or epigastric pain. No nausea, vomiting, or hematemesis; No diarrhea or constipation. No melena or hematochezia.  GENITOURINARY: No dysuria, frequency, hematuria, or incontinence  NEUROLOGICAL: No headaches, memory loss, loss of strength, numbness, or tremors  SKIN: No itching, burning, rashes, or lesions   MUSCULOSKELETAL: No joint pain or swelling; No muscle, back, or extremity pain  PSYCHIATRIC: No depression, anxiety, mood swings, or difficulty sleeping      Medications:  lisinopril 5 milliGRAM(s) Oral daily  metoprolol tartrate 12.5 milliGRAM(s) Oral two times a day  acetaminophen   Tablet .. 650 milliGRAM(s) Oral every 6 hours PRN  buPROPion XL . 150 milliGRAM(s) Oral daily  melatonin 5 milliGRAM(s) Oral at bedtime  traMADol 50 milliGRAM(s) Oral three times a day PRN  aspirin  chewable 81 milliGRAM(s) Oral daily  clopidogrel Tablet 75 milliGRAM(s) Oral daily  heparin   Injectable 5000 Unit(s) SubCutaneous every 8 hours  atorvastatin 20 milliGRAM(s) Oral at bedtime  chlorhexidine 4% Liquid 1 Application(s) Topical <User Schedule>  nystatin Cream 1 Application(s) Topical two times a day            ICU Vital Signs Last 24 Hrs  T(C): 36.4 (20 Jul 2020 09:11), Max: 37.6 (19 Jul 2020 19:58)  T(F): 97.6 (20 Jul 2020 09:11), Max: 99.7 (19 Jul 2020 19:58)  HR: 85 (20 Jul 2020 09:11) (39 - 85)  BP: 133/68 (20 Jul 2020 09:11) (91/51 - 160/84)  BP(mean): 93 (20 Jul 2020 08:00) (65 - 129)  RR: 19 (20 Jul 2020 09:11) (14 - 34)  SpO2: 97% (20 Jul 2020 09:11) (96% - 100%)        I&O's Detail    19 Jul 2020 07:01  -  20 Jul 2020 07:00  --------------------------------------------------------  IN:    dexmedetomidine Infusion: 113.3 mL    lidocaine   Infusion: 150 mL    lidocaine   Infusion: 202.5 mL    Oral Fluid: 40 mL    Solution: 50 mL  Total IN: 555.8 mL    OUT:  Total OUT: 0 mL    Total NET: 555.8 mL      20 Jul 2020 07:01  -  20 Jul 2020 14:57  --------------------------------------------------------  IN:  Total IN: 0 mL    OUT:    Intermittent Catheterization - Urethral: 1500 mL  Total OUT: 1500 mL    Total NET: -1500 mL            LABS:                        11.2   6.84  )-----------( 141      ( 20 Jul 2020 03:40 )             33.1     07-20    130<L>  |  98  |  21.0<H>  ----------------------------<  92  4.9   |  23.0  |  0.83    Ca    8.6      20 Jul 2020 03:40  Phos  3.2     07-20  Mg     1.9     07-20            CAPILLARY BLOOD GLUCOSE            CULTURES:      Physical Examination:    General: No acute distress.  Alert, oriented, interactive, nonfocal    HEENT: Pupils equal, reactive to light.  Symmetric.    PULM: Clear to auscultation bilaterally, no significant sputum production    CVS: +s1/s2    ABD: Soft, nondistended, nontender, normoactive bowel sounds, no masses    EXT: No edema, nontender    SKIN: Warm and well perfused, no rashes noted.    RADIOLOGY:   < from: Xray Chest 1 View- PORTABLE-Routine (07.17.20 @ 07:32) >  INTERPRETATION:  CHEST AP PORTABLE:    History: Weakness.    Date and time of exam: 7/17/2020 6:28 AM.    Comparison: 7/16/2020 9:38 PM.    Technique: A single AP view of the chest was obtained.    Findings:  The lungs are clear. The heart and mediastinum are unremarkable.  No hilar abnormality is seen.  There is no evidence of pleural effusion. The visualized osseous structures demonstrate degenerative changes in the spine.    Impression:    No evidence of acute cardiopulmonary disease. No change since 7/16/2020. Patient is a 79y old  Male who presents with a chief complaint of VT (19 Jul 2020 10:57)      BRIEF HOSPITAL COURSE:   78 yo M PMH HTN, HLD, CAD, OA, CVA (10 years ago) now wheel chair bound from OA/CVA, admitted June 2020 for SVT that progressed to VT was successfully cardioverted, new onset ischemic cardiomyopathy s/p LHC and stent placement in 6/2020 presents to the ED c/o weakness and episodes of unresponsiveness beginning today.  He reports feeling unwell for the past several days, then at approx noon today he began to feel very weak and reports having several episodes of passing out.  He wears a LifeVest and states the alarm went off several times today, he kept pressing a button to turn it off and then the vest would alarm again several minutes after.  He then decided to call 911, and took the LifeVest off at home    In ED pt went unresponsive in VT was given total 100 of lido and started on amio gtt, BP dropped ton SBP 60's then after maintaining NSR SBPin 80's, pt given gentle bolus of 500cc. Pt had multiple episodes of VT in ED while on amio gtt, pt converted spontaneously twice then was given another 50 of lidocaine, and started on Liodcaine gtt. Pt BP remained normotensive throughout and, pt was asymptomatic.     Pt seen and examined denies any medical complaints  alert and oriented  delirium resolved      Events last 24 hours:   -cath lab for VT ablation   - lidocaine ggt     PAST MEDICAL & SURGICAL HISTORY:  Stroke  Osteoarthritis  Hyperlipidemia  Depression  HTN (hypertension)  S/P stroke due to cerebrovascular disease    Allergies    No Known Allergies    Intolerances      FAMILY HISTORY:  Family history unknown    Social:  not an active smoker or drinker     Review of Systems:  CONSTITUTIONAL: No fever, chills, or fatigue  EYES: No eye pain, visual disturbances, or discharge  ENMT:  No difficulty hearing, tinnitus, vertigo; No sinus or throat pain  NECK: No pain or stiffness  RESPIRATORY: No cough, wheezing, chills or hemoptysis; No shortness of breath  CARDIOVASCULAR: No chest pain, palpitations, dizziness, or leg swelling  GASTROINTESTINAL: No abdominal or epigastric pain. No nausea, vomiting, or hematemesis; No diarrhea or constipation. No melena or hematochezia.  GENITOURINARY: No dysuria, frequency, hematuria, or incontinence  NEUROLOGICAL: No headaches, memory loss, loss of strength, numbness, or tremors  SKIN: No itching, burning, rashes, or lesions   MUSCULOSKELETAL: No joint pain or swelling; No muscle, back, or extremity pain  PSYCHIATRIC: No depression, anxiety, mood swings, or difficulty sleeping      Medications:  lisinopril 5 milliGRAM(s) Oral daily  metoprolol tartrate 12.5 milliGRAM(s) Oral two times a day  acetaminophen   Tablet .. 650 milliGRAM(s) Oral every 6 hours PRN  buPROPion XL . 150 milliGRAM(s) Oral daily  melatonin 5 milliGRAM(s) Oral at bedtime  traMADol 50 milliGRAM(s) Oral three times a day PRN  aspirin  chewable 81 milliGRAM(s) Oral daily  clopidogrel Tablet 75 milliGRAM(s) Oral daily  heparin   Injectable 5000 Unit(s) SubCutaneous every 8 hours  atorvastatin 20 milliGRAM(s) Oral at bedtime  chlorhexidine 4% Liquid 1 Application(s) Topical <User Schedule>  nystatin Cream 1 Application(s) Topical two times a day            ICU Vital Signs Last 24 Hrs  T(C): 36.4 (20 Jul 2020 09:11), Max: 37.6 (19 Jul 2020 19:58)  T(F): 97.6 (20 Jul 2020 09:11), Max: 99.7 (19 Jul 2020 19:58)  HR: 85 (20 Jul 2020 09:11) (39 - 85)  BP: 133/68 (20 Jul 2020 09:11) (91/51 - 160/84)  BP(mean): 93 (20 Jul 2020 08:00) (65 - 129)  RR: 19 (20 Jul 2020 09:11) (14 - 34)  SpO2: 97% (20 Jul 2020 09:11) (96% - 100%)        I&O's Detail    19 Jul 2020 07:01  -  20 Jul 2020 07:00  --------------------------------------------------------  IN:    dexmedetomidine Infusion: 113.3 mL    lidocaine   Infusion: 150 mL    lidocaine   Infusion: 202.5 mL    Oral Fluid: 40 mL    Solution: 50 mL  Total IN: 555.8 mL    OUT:  Total OUT: 0 mL    Total NET: 555.8 mL      20 Jul 2020 07:01  -  20 Jul 2020 14:57  --------------------------------------------------------  IN:  Total IN: 0 mL    OUT:    Intermittent Catheterization - Urethral: 1500 mL  Total OUT: 1500 mL    Total NET: -1500 mL            LABS:                        11.2   6.84  )-----------( 141      ( 20 Jul 2020 03:40 )             33.1     07-20    130<L>  |  98  |  21.0<H>  ----------------------------<  92  4.9   |  23.0  |  0.83    Ca    8.6      20 Jul 2020 03:40  Phos  3.2     07-20  Mg     1.9     07-20            CAPILLARY BLOOD GLUCOSE            CULTURES:      Physical Examination:    General: No acute distress.  Alert, oriented, interactive, nonfocal    HEENT: Pupils equal, reactive to light.  Symmetric.    PULM: Clear to auscultation bilaterally, no significant sputum production    CVS: +s1/s2    ABD: Soft, nondistended, nontender, normoactive bowel sounds, no masses    EXT: No edema, nontender    SKIN: Warm and well perfused, no rashes noted.    RADIOLOGY:   < from: Xray Chest 1 View- PORTABLE-Routine (07.17.20 @ 07:32) >  INTERPRETATION:  CHEST AP PORTABLE:    History: Weakness.    Date and time of exam: 7/17/2020 6:28 AM.    Comparison: 7/16/2020 9:38 PM.    Technique: A single AP view of the chest was obtained.    Findings:  The lungs are clear. The heart and mediastinum are unremarkable.  No hilar abnormality is seen.  There is no evidence of pleural effusion. The visualized osseous structures demonstrate degenerative changes in the spine.    Impression:    No evidence of acute cardiopulmonary disease. No change since 7/16/2020. Patient is a 79y old  Male who presents with a chief complaint of VT (19 Jul 2020 10:57)      BRIEF HOSPITAL COURSE:   78 yo M PMH HTN, HLD, CAD, OA, CVA (10 years ago) now wheel chair bound from OA/CVA, admitted June 2020 for SVT that progressed to VT was successfully cardioverted, new onset ischemic cardiomyopathy s/p LHC and stent placement in 6/2020 presents to the ED c/o weakness and episodes of unresponsiveness beginning today.  He reports feeling unwell for the past several days, then at approx noon today he began to feel very weak and reports having several episodes of passing out.  He wears a LifeVest and states the alarm went off several times today, he kept pressing a button to turn it off and then the vest would alarm again several minutes after.  He then decided to call 911, and took the LifeVest off at home    In ED pt went unresponsive in VT was given total 100 of lido and started on amio gtt, BP dropped ton SBP 60's then after maintaining NSR SBPin 80's, pt given gentle bolus of 500cc. Pt had multiple episodes of VT in ED while on amio gtt, pt converted spontaneously twice then was given another 50 of lidocaine, and started on Liodcaine gtt. Pt BP remained normotensive throughout and, pt was asymptomatic.     Pt seen and examined denies any medical complaints  alert and oriented  delirium resolved      Events last 24 hours:   -cath lab for VT ablation   - lidocaine ggt     PAST MEDICAL & SURGICAL HISTORY:  Stroke  Osteoarthritis  Hyperlipidemia  Depression  HTN (hypertension)  S/P stroke due to cerebrovascular disease    Allergies    No Known Allergies    Intolerances      FAMILY HISTORY:  Family history unknown    Social:  not an active smoker or drinker     Review of Systems:  CONSTITUTIONAL: No fever, chills, or fatigue  EYES: No eye pain, visual disturbances, or discharge  ENMT:  No difficulty hearing, tinnitus, vertigo; No sinus or throat pain  NECK: No pain or stiffness  RESPIRATORY: No cough, wheezing, chills or hemoptysis; No shortness of breath  CARDIOVASCULAR: No chest pain, palpitations, dizziness, or leg swelling  GASTROINTESTINAL: No abdominal or epigastric pain. No nausea, vomiting, or hematemesis; No diarrhea or constipation. No melena or hematochezia.  GENITOURINARY: No dysuria, frequency, hematuria, or incontinence  NEUROLOGICAL: No headaches, memory loss, loss of strength, numbness, or tremors  SKIN: No itching, burning, rashes, or lesions   MUSCULOSKELETAL: No joint pain or swelling; No muscle, back, or extremity pain  PSYCHIATRIC: No depression, anxiety, mood swings, or difficulty sleeping      Medications:  lisinopril 5 milliGRAM(s) Oral daily  metoprolol tartrate 12.5 milliGRAM(s) Oral two times a day  acetaminophen   Tablet .. 650 milliGRAM(s) Oral every 6 hours PRN  buPROPion XL . 150 milliGRAM(s) Oral daily  melatonin 5 milliGRAM(s) Oral at bedtime  traMADol 50 milliGRAM(s) Oral three times a day PRN  aspirin  chewable 81 milliGRAM(s) Oral daily  clopidogrel Tablet 75 milliGRAM(s) Oral daily  heparin   Injectable 5000 Unit(s) SubCutaneous every 8 hours  atorvastatin 20 milliGRAM(s) Oral at bedtime  chlorhexidine 4% Liquid 1 Application(s) Topical <User Schedule>  nystatin Cream 1 Application(s) Topical two times a day            ICU Vital Signs Last 24 Hrs  T(C): 36.4 (20 Jul 2020 09:11), Max: 37.6 (19 Jul 2020 19:58)  T(F): 97.6 (20 Jul 2020 09:11), Max: 99.7 (19 Jul 2020 19:58)  HR: 85 (20 Jul 2020 09:11) (39 - 85)  BP: 133/68 (20 Jul 2020 09:11) (91/51 - 160/84)  BP(mean): 93 (20 Jul 2020 08:00) (65 - 129)  RR: 19 (20 Jul 2020 09:11) (14 - 34)  SpO2: 97% (20 Jul 2020 09:11) (96% - 100%)        I&O's Detail    19 Jul 2020 07:01  -  20 Jul 2020 07:00  --------------------------------------------------------  IN:    dexmedetomidine Infusion: 113.3 mL    lidocaine   Infusion: 150 mL    lidocaine   Infusion: 202.5 mL    Oral Fluid: 40 mL    Solution: 50 mL  Total IN: 555.8 mL    OUT:  Total OUT: 0 mL    Total NET: 555.8 mL      20 Jul 2020 07:01  -  20 Jul 2020 14:57  --------------------------------------------------------  IN:  Total IN: 0 mL    OUT:    Intermittent Catheterization - Urethral: 1500 mL  Total OUT: 1500 mL    Total NET: -1500 mL            LABS:                        11.2   6.84  )-----------( 141      ( 20 Jul 2020 03:40 )             33.1     07-20    130<L>  |  98  |  21.0<H>  ----------------------------<  92  4.9   |  23.0  |  0.83    Ca    8.6      20 Jul 2020 03:40  Phos  3.2     07-20  Mg     1.9     07-20            CAPILLARY BLOOD GLUCOSE            CULTURES:      Physical Examination:    General: No acute distress.  Alert, oriented, interactive, nonfocal    HEENT: Pupils equal, reactive to light.  Symmetric.    PULM: Clear to auscultation bilaterally, no significant sputum production    CVS: +s1/s2    ABD: Soft, nondistended, nontender, normoactive bowel sounds, large scrotal swelling    EXT: trace edema, nontender    SKIN: Warm and well perfused, no rashes noted.    RADIOLOGY:   < from: Xray Chest 1 View- PORTABLE-Routine (07.17.20 @ 07:32) >  INTERPRETATION:  CHEST AP PORTABLE:    History: Weakness.    Date and time of exam: 7/17/2020 6:28 AM.    Comparison: 7/16/2020 9:38 PM.    Technique: A single AP view of the chest was obtained.    Findings:  The lungs are clear. The heart and mediastinum are unremarkable.  No hilar abnormality is seen.  There is no evidence of pleural effusion. The visualized osseous structures demonstrate degenerative changes in the spine.    Impression:    No evidence of acute cardiopulmonary disease. No change since 7/16/2020.

## 2020-07-20 NOTE — PROGRESS NOTE ADULT - SUBJECTIVE AND OBJECTIVE BOX
ELECTROPHYSIOLOGY BRIEF POST-OP NOTE    I have personally seen and examined the patient in spot 5 in cath lab recovery area    PRE-OP DIAGNOSIS: Bundle Branch Re-entry VT    POST-OP DIAGNOSIS: SR    PROCEDURE: EPS and Ablation of Right bundle branch followed by insertion of temporary pacing wire via right IJ. General anesthesia.   Back-up pacing set at VVI 30    I/O: 1550/0cc    COMPLICATIONS: None    Physician: Sergo Wright MD  Assistant: Lukas Ferris MD    ESTIMATED BLOOD LOSS: <10mL    ANESTHESIA TYPE:  [ x ]General Anesthesia  [  ]Sedation  [  ]Local/Regional    CONDITION:  [  ]Critical  [  ]Serious  [ x ]Stable  [  ]Good    FINDINGS: Bundle Branch Re-entry VT    EKG: SR at 56bpm; QRSD 164ms; LBBB    Vital Signs Last 24 Hrs  HR: 61bpm  BP: 141/64  RR: 16   SpO2: 100%    Physical Exam:  Constitutional: NAD, AAOx3, arousable  Cardiovascular: +S1S2 RRR  Pulmonary: Coarse breath sounds, equal expansion  GI: soft NTND +BS  Extremities: no pedal edema,   Right Groin: No hematoma. Dressing with figure 8 suture CDI  Left Groin: No hematoma. Dressing with figure 9 suture CDI  Neuro: non focal, SABA x4    A/P  79 year old male patient with past medical history significant for CVA, ICM with monomorphic VT one month ago s/p PCI to ostial lad presents with recurrent VT. He underwent another PCI to mLAD this admission, but continued to have episodes of prolonged VT. He is now s/p successful EPS and RF ablation of bundle branch re-entry VT.     -Stat ABG with lactate - patient required pressors during ablation from anesthesia.   -Bedrest x 4 hours  -stop SC Heparin. Use SCD for DVT prophylaxis   -Stop Amiodarone, Mexiletine and Lidocaine gtt  -Plan for CRT-D tomorrow  -NPO post midnight  -Figure 8 suture in right and left groin to be removed by EP staff in AM  -AM labs and EKG ELECTROPHYSIOLOGY BRIEF POST-OP NOTE    I have personally seen and examined the patient in spot 5 in cath lab recovery area    PRE-OP DIAGNOSIS: Bundle Branch Re-entry VT    POST-OP DIAGNOSIS: SR    PROCEDURE: EPS and Ablation of Right bundle branch followed by insertion of temporary pacing wire via right IJ. General anesthesia.   Back-up pacing set at VVI 30    I/O: 1550/0cc    COMPLICATIONS: None    Physician: Sergo Wright MD  Assistant: Lukas Ferris MD    ESTIMATED BLOOD LOSS: <10mL    ANESTHESIA TYPE:  [ x ]General Anesthesia  [  ]Sedation  [  ]Local/Regional    CONDITION:  [  ]Critical  [  ]Serious  [ x ]Stable  [  ]Good    FINDINGS: Bundle Branch Re-entry VT    EKG: SR at 56bpm; QRSD 164ms; LBBB    Vital Signs Last 24 Hrs  HR: 61bpm  BP: 141/64  RR: 16   SpO2: 100%    Physical Exam:  Constitutional: NAD, AAOx3, arousable  Cardiovascular: +S1S2 RRR  Pulmonary: Coarse breath sounds, equal expansion  GI: soft NTND +BS  Extremities: no pedal edema,   Right Groin: No hematoma. Dressing with figure 8 suture CDI  Left Groin: No hematoma. Dressing with figure 9 suture CDI  Neuro: non focal, SABA x4    A/P  79 year old male patient with past medical history significant for CVA, ICM with monomorphic VT one month ago s/p PCI to ostial lad presents with recurrent VT. He underwent another PCI to mLAD this admission, but continued to have episodes of prolonged VT. He is now s/p successful EPS and RF ablation of bundle branch re-entry VT.     -Stat ABG with lactate - patient required pressors during ablation from anesthesia.   -Bedrest x 4 hours  -stop SC Heparin. Use SCD for DVT prophylaxis   -Stop Amiodarone, Mexiletine and Lidocaine gtt  -Plan for CRT-D tomorrow  -NPO post midnight  -Figure 8 suture in right and left groin to be removed by EP staff in AM  -AM labs and EKG  -Would attempt to obtain Cardiac MRI in AM. If cannot be performed prior to device patient will need to wait 6 weeks for lead maturation. ELECTROPHYSIOLOGY BRIEF POST-OP NOTE    I have personally seen and examined the patient in spot 5 in cath lab recovery area    PRE-OP DIAGNOSIS: Bundle Branch Re-entry VT    POST-OP DIAGNOSIS: SR    PROCEDURE: EPS and Ablation of Right bundle branch followed by insertion of temporary pacing wire via right IJ. General anesthesia.   Back-up pacing set at VVI 30    I/O: 1550/0cc    COMPLICATIONS: None    Physician: Sergo Wright MD  Assistant: Lukas Ferris MD    ESTIMATED BLOOD LOSS: <10mL    ANESTHESIA TYPE:  [ x ]General Anesthesia  [  ]Sedation  [  ]Local/Regional    CONDITION:  [  ]Critical  [  ]Serious  [ x ]Stable  [  ]Good    FINDINGS: Bundle Branch Re-entry VT    EKG: SR at 56bpm; QRSD 164ms; LBBB    Vital Signs Last 24 Hrs  HR: 61bpm  BP: 141/64  RR: 16   SpO2: 100%    Physical Exam:  Constitutional: NAD, AAOx3, arousable  Cardiovascular: +S1S2 RRR  Pulmonary: Coarse breath sounds, equal expansion  GI: soft NTND +BS  Extremities: no pedal edema,   Right Groin: No hematoma. Dressing with figure 8 suture CDI  Left Groin: No hematoma. Dressing with figure 8 suture CDI  Neuro: non focal, SABA x4    A/P  79 year old male patient with past medical history significant for CVA, ICM with monomorphic VT one month ago s/p PCI to ostial lad presents with recurrent VT. He underwent another PCI to mLAD this admission, but continued to have episodes of prolonged VT. He is now s/p successful EPS and RF ablation of bundle branch re-entry VT.     -Stat ABG with lactate - patient required pressors during ablation from anesthesia.   -Bedrest x 4 hours  -stop SC Heparin. Use SCD for DVT prophylaxis   -Stop Amiodarone, Mexiletine and Lidocaine gtt  -Plan for CRT-D tomorrow  -NPO post midnight  -Figure 8 suture in right and left groin to be removed by EP staff in AM  -AM labs and EKG  -Would attempt to obtain Cardiac MRI in AM. If cannot be performed prior to device patient will need to wait 6 weeks for lead maturation. ELECTROPHYSIOLOGY BRIEF POST-OP NOTE    I have personally seen and examined the patient in spot 5 in cath lab recovery area    PRE-OP DIAGNOSIS: Bundle Branch Re-entry VT    POST-OP DIAGNOSIS: SR    PROCEDURE: EPS and Ablation of Right bundle branch followed by insertion of temporary pacing wire via right IJ. General anesthesia.   Back-up pacing set at VVI 30    I/O: 1550/0cc    COMPLICATIONS: None    Physician: Sergo Wright MD  Assistant: Lukas Ferris MD    ESTIMATED BLOOD LOSS: <10mL    ANESTHESIA TYPE:  [ x ]General Anesthesia  [  ]Sedation  [  ]Local/Regional    CONDITION:  [  ]Critical  [  ]Serious  [ x ]Stable  [  ]Good    FINDINGS: Bundle Branch Re-entry VT    EKG: SR at 56bpm; QRSD 164ms; LBBB    Vital Signs Last 24 Hrs  HR: 61bpm  BP: 141/64  RR: 16   SpO2: 100%    Physical Exam:  Constitutional: NAD, AAOx3, accusable; right IJ dressing site CDI. Temp wire in place.   Cardiovascular: +S1S2 RRR  Pulmonary: Coarse breath sounds, equal expansion  GI: soft NTND +BS  Extremities: no pedal edema,   Right Groin: No hematoma. Dressing with figure 8 suture CDI  Left Groin: No hematoma. Dressing with figure 8 suture CDI  Neuro: non focal, SABA x4    A/P  79 year old male patient with past medical history significant for CVA, ICM with monomorphic VT one month ago s/p PCI to ostial lad presents with recurrent VT. He underwent another PCI to mLAD this admission, but continued to have episodes of prolonged VT. He is now s/p successful EPS and RF ablation of bundle branch re-entry VT.     -Stat ABG with lactate - patient required pressors during ablation from anesthesia.   -Bedrest x 4 hours  -stop SC Heparin. Use SCD for DVT prophylaxis   -Stop Amiodarone, Mexiletine and Lidocaine gtt  -Plan for CRT-D tomorrow  -NPO post midnight  -Figure 8 suture in right and left groin to be removed by EP staff in AM  -AM labs and EKG  -Would attempt to obtain Cardiac MRI in AM. If cannot be performed prior to device patient will need to wait 6 weeks for lead maturation. ELECTROPHYSIOLOGY BRIEF POST-OP NOTE    I have personally seen and examined the patient in spot 5 in cath lab recovery area    PRE-OP DIAGNOSIS: Bundle Branch Re-entry VT    POST-OP DIAGNOSIS: SR    PROCEDURE: EPS and Ablation of Right bundle branch followed by insertion of temporary pacing wire via right IJ. General anesthesia.   Back-up pacing set at VVI 30    I/O: 1550/0cc    COMPLICATIONS: None    Physician: Sergo Wright MD  Assistant: Lukas Ferris MD    ESTIMATED BLOOD LOSS: <10mL    ANESTHESIA TYPE:  [ x ]General Anesthesia  [  ]Sedation  [  ]Local/Regional    CONDITION:  [  ]Critical  [  ]Serious  [ x ]Stable  [  ]Good    FINDINGS: Bundle Branch Re-entry VT    EKG: SR at 56bpm; QRSD 164ms; LBBB    Vital Signs Last 24 Hrs  HR: 61bpm  BP: 141/64  RR: 16   SpO2: 100%    Physical Exam:  Constitutional: NAD, AAOx3, accusable; right IJ dressing site CDI. Temp wire in place.   Cardiovascular: +S1S2 RRR  Pulmonary: Coarse breath sounds, equal expansion  GI: soft NTND +BS  Extremities: no pedal edema,   Right Groin: No hematoma. Dressing with figure 8 suture CDI  Left Groin: No hematoma. Dressing with figure 8 suture CDI  Neuro: non focal, SABA x4    A/P  79 year old male patient with past medical history significant for CVA, ICM with monomorphic VT one month ago s/p PCI to ostial lad presents with recurrent VT. He underwent another PCI to mLAD this admission, but continued to have episodes of prolonged VT. He is now s/p successful EPS and RF ablation of bundle branch re-entry VT.     -Stat ABG with lactate - patient required pressors during ablation from anesthesia.   -Bedrest x 4 hours  -stop SC Heparin. Use SCD for DVT prophylaxis   -Stop Amiodarone, Mexiletine and Lidocaine gtt  -Plan for CRT-D tomorrow  -NPO post midnight  -Figure 8 suture in right and left groin to be removed by EP staff in AM  -AM labs and EKG  -Would attempt to obtain Cardiac MRI in AM if temporary wire can be removed. If cannot be performed prior to device patient will need to wait 6 weeks for lead maturation.

## 2020-07-21 NOTE — PROGRESS NOTE ADULT - SUBJECTIVE AND OBJECTIVE BOX
80 yo M PMH HTN, HLD, CAD, OA, CVA (10 years ago) now wheel chair bound from OA/CVA, admitted June 2020 for SVT that progressed to VT was successfully cardioverted, new onset ischemic cardiomyopathy s/p LHC and stent placement in 6/2020. Admitted on 7/16 w/ Vtach and ischemic cardiomyopathy, went to cath lab s/p 1 BENSON to LAD. Post cath patient w/ recurrent runs of NSVT, placed on lidocaine infusion.     7/20 patient went of Vtach ablation, had TVP placed in lab. Has been off lidocaine since 5:30am w/out any runs of NSVT post ablation. Tonight appeared TVP was capturing inappropriately on VVI w/ rate of 30. Pt had an intrinsic rate 50-60's. TVP was turned off for fear of R on T precipitating an arrhythmia.     Was called by RN tonight after patient had 2x 5-6 second pauses followed by asystolic arrest as I arrived to bedside. ACLS was started immediately, TVP was turned back on and 1 epi was given. ROSC achieved on 1st pulse check.  Post arrest he  moving all extremities and following commands. Intubated for airway protection. TPV was set at LUANNE, rate @ 60bpm. Earlier in the night patient had an episode that resembled partial/ absent seizure vs TIA, he went aphasic, not following commands, not making eye contact or tracking when calling his name. Resolved after about 20 seconds. CT head was negative. As per patient, this is not the first time this happened and it usually preceded by a blury vision aura.     Plan:  - Sedated on propofol  - Intubated on AC/VC, titrating FiO2 to maintain O2 sat >92%. Post intubation ABG changes made. Vent bundle in place.  Vent bundle in place. Will SBT/ SAT in am.   - No indication for hypothermia protocol w/ return in mental status post arrest. Repeat EKG  - TVP was turned back on Now AV pacing at 60bpm w/ appropriate capture. CXR to confirm placement   - Started on levophed post arrest, likely sedation induced. Titrating to keep MAP >65.   - Lactate 3.5 , given 500cc bolus over 2 hours.   - Hypomagnesemia given 2mg Mgsulfate.     Dr Ferris and Dr. Lopez made aware of tonights events.       Critical Care time: 55 mins assessing presenting problems of acute illness that poses high probability of life threatening deterioration or end organ damage/dysfunction.  Medical decision making including Initiating plan of care, reviewing data, reviewing radiology, direct patient bedside evaluation and interpretation of vital signs, any necessary ventilator management , discussion with multidisciplinary team, discussing goals of care with patient/family, all non inclusive of procedures 78 yo M PMH HTN, HLD, CAD, OA, CVA (10 years ago) now wheel chair bound from OA/CVA, admitted June 2020 for SVT that progressed to VT was successfully cardioverted, new onset ischemic cardiomyopathy s/p LHC and stent placement in 6/2020. Admitted on 7/16 w/ Vtach and ischemic cardiomyopathy, went to cath lab s/p 1 BENSON to LAD. Post cath patient w/ recurrent runs of NSVT, placed on lidocaine infusion.     7/20 patient went of Vtach ablation, had TVP placed in lab. Has been off lidocaine since 5:30am w/out any runs of NSVT post ablation. Tonight appeared TVP was capturing inappropriately on VVI w/ rate of 30. Pt had an intrinsic rate 50-60's. TVP was turned off for fear of R on T precipitating an arrhythmia.     Was called by RN tonight after patient had 2x 5-6 second pauses followed by asystolic arrest as I arrived to bedside. ACLS was started immediately, TVP was turned back on and 1 epi was given. ROSC achieved on 1st pulse check.  Post arrest he  moving all extremities and following commands. Intubated for airway protection. TPV was set at LUANNE, rate @ 60bpm. Earlier in the night patient had an episode that resembled partial/ absent seizure vs TIA, he went aphasic, not following commands, not making eye contact or tracking when calling his name. Resolved after about 20 seconds. CT head was negative. As per patient, this is not the first time this happened and it usually preceded by a blury vision aura.     Impression:  1. Cardiac arrest  2. Bradycardia  3. Hypotension  4. Acute respiratory failure   5. V-tach  6. r/o Seizures     Plan:  - Sedated on propofol. Ordered for continuos EEG monitoring. CT head was negative for acute pathology   - Intubated on AC/VC, titrating FiO2 to maintain O2 sat >92%. Post intubation ABG changes made. Vent bundle in place.  Vent bundle in place. Will SBT/ SAT in am.   - No indication for hypothermia protocol w/ return in mental status post arrest. Repeat EKG  - TVP was turned back on Now AV pacing at 60bpm w/ appropriate capture. CXR to confirm placement   - Started on levophed post arrest, likely sedation induced. Titrating to keep MAP >65.   - Lactate 3.5 , given 500cc bolus over 2 hours.   - Hypomagnesemia given 2mg Mgsulfate.     Dr Ferris and Dr. Lopez made aware of tonights events.       Critical Care time: 55 mins assessing presenting problems of acute illness that poses high probability of life threatening deterioration or end organ damage/dysfunction.  Medical decision making including Initiating plan of care, reviewing data, reviewing radiology, direct patient bedside evaluation and interpretation of vital signs, any necessary ventilator management , discussion with multidisciplinary team, discussing goals of care with patient/family, all non inclusive of procedures 78 yo M PMH HTN, HLD, CAD, OA, CVA (10 years ago) now wheel chair bound from OA/CVA, admitted June 2020 for SVT that progressed to VT was successfully cardioverted, new onset ischemic cardiomyopathy s/p LHC and stent placement in 6/2020. Admitted on 7/16 w/ Vtach and ischemic cardiomyopathy, went to cath lab s/p 1 BENSON to LAD. Post cath patient w/ recurrent runs of NSVT, placed on lidocaine infusion.     7/20 patient went of Vtach ablation, had TVP placed in lab. Has been off lidocaine since 5:30am w/out any runs of NSVT post ablation. Tonight appeared TVP was capturing inappropriately on VVI w/ rate of 30. Pt had an intrinsic rate 50-60's. TVP was turned off for fear of R on T precipitating an arrhythmia.     Was called by RN tonight after patient had 2x 5-6 second pauses followed by asystolic arrest as I arrived to bedside. ACLS was started immediately, TVP was turned back on and 1 epi was given. ROSC achieved on 1st pulse check.  Post arrest he  moving all extremities and following commands. Intubated for airway protection. TPV was set at LUANNE, rate @ 60bpm. Earlier in the night patient had an episode that resembled partial/ absent seizure vs TIA, he went aphasic, not following commands, not making eye contact or tracking when calling his name. Resolved after about 20 seconds. CT head was negative. As per patient, this is not the first time this happened and it usually preceded by a blury vision aura.     Impression:  1. Cardiac arrest  2. Bradycardia  3. Hypotension  4. Acute respiratory failure   5. V-tach  6. r/o Seizures     Plan:  - Sedated on propofol. Ordered for continuos EEG monitoring. CT head was negative for acute pathology   - Intubated on AC/VC, titrating FiO2 to maintain O2 sat >92%. Post intubation ABG changes made. Vent bundle in place.  Vent bundle in place. Will SBT/ SAT in am.   - No indication for hypothermia protocol w/ return in mental status post arrest. Repeat EKG  - TVP was turned back on Now AV pacing at 60bpm w/ appropriate capture. CXR to confirm placement   - Started on levophed post arrest, likely sedation induced. Titrating to keep MAP >65.   - Lactate 3.5 , given 500cc bolus over 2 hours.   - Hypomagnesemia given 2mg Mgsulfate.   -Plan for AICD in am w/ EP. NPO after midnight.     Dr Ferris and Dr. Lopez made aware of tonights events.       Critical Care time: 55 mins assessing presenting problems of acute illness that poses high probability of life threatening deterioration or end organ damage/dysfunction.  Medical decision making including Initiating plan of care, reviewing data, reviewing radiology, direct patient bedside evaluation and interpretation of vital signs, any necessary ventilator management , discussion with multidisciplinary team, discussing goals of care with patient/family, all non inclusive of procedures

## 2020-07-21 NOTE — PROCEDURE NOTE - NSPROCDETAILS_GEN_ALL_CORE
sterile dressing applied/supine position/location identified, draped/prepped, sterile technique used/sterile technique, catheter placed/ultrasound guidance

## 2020-07-21 NOTE — AIRWAY PLACEMENT NOTE ADULT - POST AIRWAY PLACEMENT ASSESSMENT:
positive end tidal CO2 noted/CXR pending/breath sounds bilateral/breath sounds equal/skin color improved/chest excursion noted

## 2020-07-21 NOTE — PROGRESS NOTE ADULT - SUBJECTIVE AND OBJECTIVE BOX
Patient is a 79y old  Male who presents with a chief complaint of VT (21 Jul 2020 08:21)    BRIEF HOSPITAL COURSE:   80 yo M PMH HTN, HLD, CAD, OA, CVA (10 years ago) now wheel chair bound from OA/CVA, admitted June 2020 for SVT that progressed to VT was successfully cardioverted, new onset ischemic cardiomyopathy s/p LHC and stent placement in 6/2020 presents to the ED c/o weakness and episodes of unresponsiveness beginning today.  Pt admitted to the ICU for VT/SVT, ischemic cardiomyopathy HFrEF <20% from previous admission,  CARLA pre-renal vs. ATN     Events last 24 hours:   Asystolic cardiac arrest overnight w/ ROSC in 2-3min and after TVP was turned back on. Followed commands post arrest. Currently on Levo @ 0.05 and propofol    PAST MEDICAL & SURGICAL HISTORY:  Stroke  Osteoarthritis  Hyperlipidemia  Depression  HTN (hypertension)  S/P stroke due to cerebrovascular disease      Review of Systems:  Could not assess given sedation     Physical Examination:    ICU Vital Signs Last 24 Hrs  T(C): 36.6 (21 Jul 2020 07:58), Max: 36.6 (20 Jul 2020 19:00)  T(F): 97.8 (21 Jul 2020 07:58), Max: 97.8 (20 Jul 2020 19:00)  HR: 84 (21 Jul 2020 08:00) (0 - 87)  BP: 110/55 (21 Jul 2020 08:00) (76/52 - 180/84)  BP(mean): 83 (21 Jul 2020 07:00) (60 - 121)  ABP: 123/58 (20 Jul 2020 16:35) (115/50 - 146/63)  ABP(mean): --  RR: 21 (21 Jul 2020 08:00) (11 - 28)  SpO2: 100% (21 Jul 2020 08:00) (88% - 100%)      Neuro: Sedated on full vent support. Could not assess neuro status    HEENT: Pupils equal, reactive to light, Moist oral mucosa    PULM: Clear to auscultation bilaterally, no significant adventitious breath sounds     CVS: Regular rhythm and controlled rate, no murmurs, rubs, or gallops    ABD: Soft, nondistended, nontender, normoactive bowel sounds    EXT: No b/l LE edema, nontender with pedal pulse palpable     SKIN: Warm and well perfused, no acute rashes       Medications:    lisinopril 5 milliGRAM(s) Oral daily  metoprolol tartrate 12.5 milliGRAM(s) Oral two times a day  norepinephrine Infusion 0.05 MICROgram(s)/kG/Min IV Continuous <Continuous>      acetaminophen   Tablet .. 650 milliGRAM(s) Oral every 6 hours PRN  buPROPion XL . 150 milliGRAM(s) Oral daily  melatonin 5 milliGRAM(s) Oral at bedtime  propofol Infusion 20 MICROgram(s)/kG/Min IV Continuous <Continuous>  traMADol 50 milliGRAM(s) Oral three times a day PRN      aspirin  chewable 81 milliGRAM(s) Oral daily  clopidogrel Tablet 75 milliGRAM(s) Oral daily    pantoprazole  Injectable 40 milliGRAM(s) IV Push daily      atorvastatin 20 milliGRAM(s) Oral at bedtime        chlorhexidine 0.12% Liquid 15 milliLiter(s) Oral Mucosa every 12 hours  chlorhexidine 4% Liquid 1 Application(s) Topical <User Schedule>  nystatin Cream 1 Application(s) Topical two times a day        Mode: AC/ CMV (Assist Control/ Continuous Mandatory Ventilation)  RR (machine): 16  TV (machine): 400  FiO2: 30  PEEP: 5  MAP: 7  PIP: 15      I&O's Detail    20 Jul 2020 07:01  -  21 Jul 2020 07:00  --------------------------------------------------------  IN:    norepinephrine Infusion: 94.3 mL    Oral Fluid: 50 mL    propofol Infusion: 112.5 mL    Solution: 100 mL    Solution: 50 mL  Total IN: 406.8 mL    OUT:    Indwelling Catheter - Urethral: 3050 mL    Intermittent Catheterization - Urethral: 1500 mL  Total OUT: 4550 mL    Total NET: -4143.2 mL            RADIOLOGY/ Microbiology/ Labs: reviewed

## 2020-07-21 NOTE — PROGRESS NOTE ADULT - SUBJECTIVE AND OBJECTIVE BOX
PROCEDURE(S): Biventricular Defibrillator Implant    ELECTROPHYSIOLOGIST(S): Sergo Wright MD    COMPLICATIONS:  none          DISPOSITION:  Observation Unit           CONDITION: Stable    Pt doing well s/p CRT-D implant via left cephalic axillary venous access.  Pt remains intubated, as he arrived intubated prior to procedure.    Device setting:   DDD  bpm, right atrial and ventricular output 3.5V @ 0.4ms, LV 4V @ 0.5ms; atrial sensitivity 0.3mV and ventricular sensitivity 0.3mV   Zone VF Rate 240 Therapies: ATP during charging, 35JX 6  Zone FVT Rate 188 Therapies: Burst 84% X 2, 35J x 5  Zone VT Rate 130 Therapies Burst 84% X 3, Ramp 88% X 3, 35J X 4  Zone VT Rate 120 Monitor     MEDICATIONS  (STANDING):  aspirin  chewable 81 milliGRAM(s) Oral daily  atorvastatin 20 milliGRAM(s) Oral at bedtime  buPROPion XL . 150 milliGRAM(s) Oral daily  chlorhexidine 0.12% Liquid 15 milliLiter(s) Oral Mucosa every 12 hours  chlorhexidine 4% Liquid 1 Application(s) Topical <User Schedule>  clopidogrel Tablet 75 milliGRAM(s) Oral daily  lisinopril 5 milliGRAM(s) Oral daily  melatonin 5 milliGRAM(s) Oral at bedtime  metoprolol tartrate 12.5 milliGRAM(s) Oral two times a day  norepinephrine Infusion 0.05 MICROgram(s)/kG/Min (9.28 mL/Hr) IV Continuous <Continuous>  nystatin Cream 1 Application(s) Topical two times a day  pantoprazole  Injectable 40 milliGRAM(s) IV Push daily  propofol Infusion 20 MICROgram(s)/kG/Min (11.9 mL/Hr) IV Continuous <Continuous>    MEDICATIONS  (PRN):  acetaminophen   Tablet .. 650 milliGRAM(s) Oral every 6 hours PRN Temp greater or equal to 38C (100.4F), Moderate Pain (4 - 6)  traMADol 50 milliGRAM(s) Oral three times a day PRN Moderate Pain (4 - 6)    VS:   T(C): 37.1 (07-21-20 @ 11:44), Max: 37.1 (07-21-20 @ 11:44)  HR: 65 (07-21-20 @ 14:00) (0 - 87)  BP: 90/53 (07-21-20 @ 14:00) (67/44 - 180/84)  RR: 13 (07-21-20 @ 14:00) (11 - 28)  SpO2: 99% (07-21-20 @ 14:00) (88% - 100%)  Post- procedure VS: /76 HR 98 O2 sat 98%     Physical exam:   VSS, intubated/seduated   Incision: Dressing C/D/I; no bleeding, hematoma, erythema or edema  Card: S1/S2, RRR  Resp: equal chest rise, assisted ventilation   Abd: S/NT/ND  Groins: b/l groin site wnl, sutures removed prior to my arrival; no bleeding, swelling, hematoma + scrotal edema  Ext: no edema, distal pulses intact    LABS:                         12.0   16.05 )-----------( 150      ( 21 Jul 2020 05:09 )             36.7   07-21    136  |  102  |  21.0<H>  ----------------------------<  136<H>  4.9   |  22.0  |  1.05    Ca    8.8      21 Jul 2020 05:09  Phos  3.7     07-21  Mg     1.9     07-21    TPro  5.6<L>  /  Alb  3.3  /  TBili  0.4  /  DBili  x   /  AST  33  /  ALT  11  /  AlkPhos  74  07-21    TTE: 7/17/2020:   Summary:   1. Endocardial visualization was enhanced with intravenous echo contrast.   2. Mildly enlarged left atrium.   3. Left ventricular ejection fraction, by visual estimation, is <20%.   4. The right atrium is normal in size.   5. The right ventricular size is mildly enlarged. Moderately reduced RV systolic function.   6. No significant valvular abnormality.   7. There is no evidence of pericardial effusion.   8. Dilatation of the aortic root and sinuses of Valsalva.   9. Compared to study from 6/2020, there is no significant change in LV function.  Sarahi Magallon MD, RPVI Electronically signed on 7/17/2020 at 4:05:19 PM     Assessment:   79 year old patient with past medical history significant for CVA, ICM with monomorphic VT one month ago s/p PCI to ostial lad presented with recurrent VT. He underwent another PCI to mLAD this admission, but continued to have episodes of prolonged VT.  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  Overnight event in which TVP appeared to be capturing inappropriately on VVI w/rate of 30, intrinsic rate 50-60's.  TVP was turned off for fear of R on T precipitating an arrhythmia.  Pt subsequently underwent 2x 5-6 second pauses followed by asystolic arrest.  ACLS initiated, pt intubated, TVP turned back on and 1 epi given with ROSC.  Pt returned to EP lab today for MDT CRT-D implant, uncomplicated.  Transferred back to ICU and received by staff in stable condition.       Plan:  Continued observation on telemetry overnight.   Cont Ancef 2gm IV q 8 hours x 2 additional doses to complete 24 hour course.   Pain control with PO analgesia PRN.   NO HEPARIN OR LOVENOX, INCLUDING PROPHYLACTIC/SUBCUT DOSING, UNTIL OTHERWISE ADVISED BY EP.   ICM: con't metoprolol and lisinopril   EKG, Labs, PA/Lat CXR and device check in AM.   Shoulder ROM limited to 90 deg & below x 6 weeks.   Will follow. PROCEDURE(S): Biventricular Defibrillator Implant    ELECTROPHYSIOLOGIST(S): Sergo Wright MD    COMPLICATIONS:  none          DISPOSITION:  Observation Unit           CONDITION: Stable    Pt doing well s/p CRT-D implant via left cephalic axillary venous access.  Pt remains intubated, as he arrived intubated prior to procedure.    Device setting:   DDD  bpm, right atrial and ventricular output 3.5V @ 0.4ms, LV 4V @ 0.5ms; atrial sensitivity 0.3mV and ventricular sensitivity 0.3mV   Zone VF Rate 240 Therapies: ATP during charging, 35JX 6  Zone FVT Rate 188 Therapies: Burst 84% X 2, 35J x 5  Zone VT Rate 130 Therapies Burst 84% X 3, Ramp 88% X 3, 35J X 4  Zone VT Rate 120 Monitor     MEDICATIONS  (STANDING):  aspirin  chewable 81 milliGRAM(s) Oral daily  atorvastatin 20 milliGRAM(s) Oral at bedtime  buPROPion XL . 150 milliGRAM(s) Oral daily  chlorhexidine 0.12% Liquid 15 milliLiter(s) Oral Mucosa every 12 hours  chlorhexidine 4% Liquid 1 Application(s) Topical <User Schedule>  clopidogrel Tablet 75 milliGRAM(s) Oral daily  lisinopril 5 milliGRAM(s) Oral daily  melatonin 5 milliGRAM(s) Oral at bedtime  metoprolol tartrate 12.5 milliGRAM(s) Oral two times a day  norepinephrine Infusion 0.05 MICROgram(s)/kG/Min (9.28 mL/Hr) IV Continuous <Continuous>  nystatin Cream 1 Application(s) Topical two times a day  pantoprazole  Injectable 40 milliGRAM(s) IV Push daily  propofol Infusion 20 MICROgram(s)/kG/Min (11.9 mL/Hr) IV Continuous <Continuous>    MEDICATIONS  (PRN):  acetaminophen   Tablet .. 650 milliGRAM(s) Oral every 6 hours PRN Temp greater or equal to 38C (100.4F), Moderate Pain (4 - 6)  traMADol 50 milliGRAM(s) Oral three times a day PRN Moderate Pain (4 - 6)    VS:   T(C): 37.1 (07-21-20 @ 11:44), Max: 37.1 (07-21-20 @ 11:44)  HR: 65 (07-21-20 @ 14:00) (0 - 87)  BP: 90/53 (07-21-20 @ 14:00) (67/44 - 180/84)  RR: 13 (07-21-20 @ 14:00) (11 - 28)  SpO2: 99% (07-21-20 @ 14:00) (88% - 100%)  Post- procedure VS: /76 HR 98 O2 sat 98%     Physical exam:   VSS, intubated/sedated   Incision: Dressing C/D/I; no bleeding, hematoma, erythema or edema  Card: S1/S2, RRR  Resp: equal chest rise, assisted ventilation   Abd: S/NT/ND  Groins: b/l groin site wnl, sutures removed prior to my arrival; no bleeding, swelling, hematoma noted;  + scrotal edema  Ext: no edema, distal pulses intact    LABS:                         12.0   16.05 )-----------( 150      ( 21 Jul 2020 05:09 )             36.7   07-21    136  |  102  |  21.0<H>  ----------------------------<  136<H>  4.9   |  22.0  |  1.05    Ca    8.8      21 Jul 2020 05:09  Phos  3.7     07-21  Mg     1.9     07-21    TPro  5.6<L>  /  Alb  3.3  /  TBili  0.4  /  DBili  x   /  AST  33  /  ALT  11  /  AlkPhos  74  07-21    TTE: 7/17/2020:   Summary:   1. Endocardial visualization was enhanced with intravenous echo contrast.   2. Mildly enlarged left atrium.   3. Left ventricular ejection fraction, by visual estimation, is <20%.   4. The right atrium is normal in size.   5. The right ventricular size is mildly enlarged. Moderately reduced RV systolic function.   6. No significant valvular abnormality.   7. There is no evidence of pericardial effusion.   8. Dilatation of the aortic root and sinuses of Valsalva.   9. Compared to study from 6/2020, there is no significant change in LV function.  Sarahi Magallon MD, RPVI Electronically signed on 7/17/2020 at 4:05:19 PM     Assessment:   79 year old male w/PMHx significant for CVA, ischemic cardiomyopathy HFrEF <20% with monomorphic VT, s/p PCI to ostial lad 6/2020 who presented to [facility] with syncope and recurrent VT.  He underwent another PCI to mLAD this admission, but continued to have episodes of prolonged VT.  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  Overnight event in which TVP appeared to be capturing inappropriately on VVI w/rate of 30, intrinsic rate 50-60's.  TVP was turned off for fear of R on T precipitating an arrhythmia.  Pt subsequently underwent 2x 5-6 second pauses followed by asystolic arrest.  ACLS initiated, pt intubated, TVP turned back on and 1 epi given with ROSC.  Pt returned to EP lab today for MDT CRT-D implant, uncomplicated.  Transferred back to ICU post procedure and received by staff in stable condition.       Plan:  Continued observation on telemetry overnight.   Cont Ancef 2gm IV q 8 hours x 2 additional doses to complete 24 hour course.   Pain control with PO analgesia PRN.   NO HEPARIN OR LOVENOX, INCLUDING PROPHYLACTIC/SUBCUT DOSING, UNTIL OTHERWISE ADVISED BY EP.   ICM: con't metoprolol and lisinopril   EKG, Labs, PA/Lat CXR and device check in AM.   Left shoulder ROM limited to 90 deg & below x 6 weeks.   Will follow. PROCEDURE(S): Biventricular Defibrillator Implant    ELECTROPHYSIOLOGIST(S): Sergo Wright MD    COMPLICATIONS:  none          DISPOSITION:  Observation Unit           CONDITION: Stable    Pt doing well s/p CRT-D implant via left cephalic axillary venous access.  Pt remains intubated, as he arrived intubated prior to procedure.    Device setting:   DDD  bpm, right atrial and ventricular output 3.5V @ 0.4ms, LV 4V @ 0.5ms; atrial sensitivity 0.3mV and ventricular sensitivity 0.3mV   Zone VF Rate 240 Therapies: ATP during charging, 35JX 6  Zone FVT Rate 188 Therapies: Burst 84% X 2, 35J x 5  Zone VT Rate 130 Therapies Burst 84% X 3, Ramp 88% X 3, 35J X 4  Zone VT Rate 120 Monitor     MEDICATIONS  (STANDING):  aspirin  chewable 81 milliGRAM(s) Oral daily  atorvastatin 20 milliGRAM(s) Oral at bedtime  buPROPion XL . 150 milliGRAM(s) Oral daily  chlorhexidine 0.12% Liquid 15 milliLiter(s) Oral Mucosa every 12 hours  chlorhexidine 4% Liquid 1 Application(s) Topical <User Schedule>  clopidogrel Tablet 75 milliGRAM(s) Oral daily  lisinopril 5 milliGRAM(s) Oral daily  melatonin 5 milliGRAM(s) Oral at bedtime  metoprolol tartrate 12.5 milliGRAM(s) Oral two times a day  norepinephrine Infusion 0.05 MICROgram(s)/kG/Min (9.28 mL/Hr) IV Continuous <Continuous>  nystatin Cream 1 Application(s) Topical two times a day  pantoprazole  Injectable 40 milliGRAM(s) IV Push daily  propofol Infusion 20 MICROgram(s)/kG/Min (11.9 mL/Hr) IV Continuous <Continuous>    MEDICATIONS  (PRN):  acetaminophen   Tablet .. 650 milliGRAM(s) Oral every 6 hours PRN Temp greater or equal to 38C (100.4F), Moderate Pain (4 - 6)  traMADol 50 milliGRAM(s) Oral three times a day PRN Moderate Pain (4 - 6)    VS:   T(C): 37.1 (07-21-20 @ 11:44), Max: 37.1 (07-21-20 @ 11:44)  HR: 65 (07-21-20 @ 14:00) (0 - 87)  BP: 90/53 (07-21-20 @ 14:00) (67/44 - 180/84)  RR: 13 (07-21-20 @ 14:00) (11 - 28)  SpO2: 99% (07-21-20 @ 14:00) (88% - 100%)  Post- procedure VS: /76 HR 98 O2 sat 98%     Physical exam:   VSS, intubated/sedated   Incision: Dressing C/D/I; no bleeding, hematoma, erythema or edema  Card: S1/S2, RRR  Resp: equal chest rise, assisted ventilation   Abd: S/NT/ND  Groins: b/l groin site wnl, sutures removed prior to my arrival; no bleeding, swelling, hematoma noted;  + scrotal edema  Ext: no edema, distal pulses intact    LABS:                         12.0   16.05 )-----------( 150      ( 21 Jul 2020 05:09 )             36.7   07-21    136  |  102  |  21.0<H>  ----------------------------<  136<H>  4.9   |  22.0  |  1.05    Ca    8.8      21 Jul 2020 05:09  Phos  3.7     07-21  Mg     1.9     07-21    TPro  5.6<L>  /  Alb  3.3  /  TBili  0.4  /  DBili  x   /  AST  33  /  ALT  11  /  AlkPhos  74  07-21    TTE: 7/17/2020:   Summary:   1. Endocardial visualization was enhanced with intravenous echo contrast.   2. Mildly enlarged left atrium.   3. Left ventricular ejection fraction, by visual estimation, is <20%.   4. The right atrium is normal in size.   5. The right ventricular size is mildly enlarged. Moderately reduced RV systolic function.   6. No significant valvular abnormality.   7. There is no evidence of pericardial effusion.   8. Dilatation of the aortic root and sinuses of Valsalva.   9. Compared to study from 6/2020, there is no significant change in LV function.  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  He underwent another PCI to mLAD this admission, but continued to have episodes of prolonged VT.  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  Overnight event in which TVP appeared to be capturing inappropriately on VVI w/rate of 30, intrinsic rate 50-60's.  TVP was turned off for fear of R on T precipitating an arrhythmia.  Pt subsequently underwent 2x 5-6 second pauses followed by asystolic arrest.  ACLS initiated, pt intubated, TVP turned back on and 1 epi given with ROSC.  Pt returned to EP lab today for MDT CRT-D implant, uncomplicated. TVP was removed in the room, and figure of eight groin sutures were removed too. Transferred back to ICU post procedure and received by staff in stable condition.       Plan:  Continued observation on telemetry overnight.   Cont Ancef 2gm IV q 8 hours x 3 additional doses to complete 24 hour course.   Pain control with PO analgesia PRN.   NO HEPARIN OR LOVENOX, INCLUDING PROPHYLACTIC/SUBCUT DOSING, UNTIL OTHERWISE ADVISED BY EP.   ICM: con't metoprolol and lisinopril   EKG, Labs, PA/Lat CXR and device check in AM.   Left shoulder ROM limited to 90 deg & below x 6 weeks.   Will follow.

## 2020-07-21 NOTE — PROGRESS NOTE ADULT - ASSESSMENT
Sustained VT s/p ablation   Cardiac arrest, ROSC 3min   Acute hypoxic resp failure  CAD, s/p PCI (07/17)  ICM w/ EF < 25%  ? seizures     Patient seen and examined     Neuro: Pt had an brief episode of unresponsiveness (blank stare) yesterday evening prior to cardiac event. CTH was normal. EEG pending  Cardiovascular: S/p LHC w/ PCI to LAC on 07/17. On ASA, statin, low dose  beta-blockers and lisinopril. Currently on levo. S/p VT ablation on 07/20 and stopped antiarrhythmics after ablation. Planned for AICD today  Resp/Chest: Maintain SpO2 > 92%. On Volume AC. Vent bundle. Plan to extubate after procedure today  GI/Nutrition: Diet: NPO for now. IV PPI  ID: No active issues  Renal: Avoid nephrotoxic agents. Strict I&O  Endocrinology: Maintain Blood sugar < 180. ISS as per protocol  Haem/Oncology:  DVT ppx w/ SCDs    Critical Care time: 35 minutes assessing presenting problems of acute illness that poses high probability of life threatening deterioration or end organ damage/dysfunction.  Medical decision making including Initiating plan of care, reviewing data, reviewing radiology, discussing with multidisciplinary team, non inclusive of procedures

## 2020-07-21 NOTE — PROGRESS NOTE ADULT - SUBJECTIVE AND OBJECTIVE BOX
[facility] - [facility]                                   [address] [place] [address]                                                      PHONE: [phone]                                                         FAX: [phone]  [email or web address]  ---------------------------------------------------------------------------------------------------------------------------------    Overnight events/patient complaints: events noted, asystolic arrest, CPR, intubation, epi, temp pacer in place, awaiitng BiV ICD today post VT ablation yesterday.  Low dose pressors while sedated    No Known Allergies    MEDICATIONS  (STANDING):  aspirin  chewable 81 milliGRAM(s) Oral daily  atorvastatin 20 milliGRAM(s) Oral at bedtime  buPROPion XL . 150 milliGRAM(s) Oral daily  chlorhexidine 0.12% Liquid 15 milliLiter(s) Oral Mucosa every 12 hours  chlorhexidine 4% Liquid 1 Application(s) Topical <User Schedule>  clopidogrel Tablet 75 milliGRAM(s) Oral daily  lisinopril 5 milliGRAM(s) Oral daily  magnesium sulfate  IVPB 1 Gram(s) IV Intermittent once  melatonin 5 milliGRAM(s) Oral at bedtime  metoprolol tartrate 12.5 milliGRAM(s) Oral two times a day  norepinephrine Infusion 0.05 MICROgram(s)/kG/Min (9.28 mL/Hr) IV Continuous <Continuous>  nystatin Cream 1 Application(s) Topical two times a day  pantoprazole  Injectable 40 milliGRAM(s) IV Push daily  propofol Infusion 20 MICROgram(s)/kG/Min (11.9 mL/Hr) IV Continuous <Continuous>    MEDICATIONS  (PRN):  acetaminophen   Tablet .. 650 milliGRAM(s) Oral every 6 hours PRN Temp greater or equal to 38C (100.4F), Moderate Pain (4 - 6)  traMADol 50 milliGRAM(s) Oral three times a day PRN Moderate Pain (4 - 6)      Vital Signs Last 24 Hrs  T(C): 36.6 (2020 07:58), Max: 36.6 (2020 19:00)  T(F): 97.8 (2020 07:58), Max: 97.8 (2020 19:00)  HR: 84 (2020 08:00) (0 - 87)  BP: 110/55 (2020 08:00) (76/52 - 180/84)  BP(mean): 83 (2020 07:00) (60 - 121)  RR: 21 (2020 08:00) (11 - 28)  SpO2: 100% (2020 08:00) (88% - 100%)  Daily     Daily Weight in k.5 (2020 03:09)  ICU Vital Signs Last 24 Hrs  SASKIA ELIAS  I&O's Detail    2020 07:01  -  2020 07:00  --------------------------------------------------------  IN:    norepinephrine Infusion: 94.3 mL    Oral Fluid: 50 mL    propofol Infusion: 112.5 mL    Solution: 100 mL    Solution: 50 mL  Total IN: 406.8 mL    OUT:    Indwelling Catheter - Urethral: 3050 mL    Intermittent Catheterization - Urethral: 1500 mL  Total OUT: 4550 mL    Total NET: -4143.2 mL        I&O's Summary    2020 07:01  -  2020 07:00  --------------------------------------------------------  IN: 406.8 mL / OUT: 4550 mL / NET: -4143.2 mL      Drug Dosing Weight  SASKIA ELIAS  Mode: AC/ CMV (Assist Control/ Continuous Mandatory Ventilation), RR (machine): 16, TV (machine): 400, FiO2: 30, PEEP: 5, MAP: 7, PIP: 15    PHYSICAL EXAM:  General: Appears well developed, well nourished alert and cooperative.  HEENT: Head; normocephalic, atraumatic.  Eyes: Pupils reactive, cornea wnl.  Neck: Supple, no nodes adenopathy, no NVD or carotid bruit or thyromegaly.  CARDIOVASCULAR: Normal S1 and S2, No murmur, rub, gallop or lift.   LUNGS: No rales, rhonchi or wheeze. Normal breath sounds bilaterally.  ABDOMEN: Soft, nontender without mass or organomegaly. bowel sounds normoactive.  EXTREMITIES: No clubbing, cyanosis or edema. Distal pulses wnl.   SKIN: warm and dry with normal turgor.  NEURO: Alert/oriented x 3/normal motor exam. No pathologic reflexes.    PSYCH: normal affect.        LABS:                        12.0   16.05 )-----------( 150      ( 2020 05:09 )             36.7     07-21    136  |  102  |  21.0<H>  ----------------------------<  136<H>  4.9   |  22.0  |  1.05    Ca    8.8      2020 05:09  Phos  3.7     07-21  Mg     1.9     07-21    TPro  5.6<L>  /  Alb  3.3  /  TBili  0.4  /  DBili  x   /  AST  33  /  ALT  11  /  AlkPhos  74  07-21    [facility]            RADIOLOGY & ADDITIONAL STUDIES:    INTERPRETATION OF TELEMETRY (personally reviewed):    ECG:< from: 12 Lead ECG (20 @ 15:31) >    Diagnosis Line Normal sinus rhythm with 1st degree A-V block with a demand pacemaker  Left anterior fascicular block  Abnormal ECG    Confirmed by NITHIN REAGAN ([id number]) on 2020 4:06:08 PM    < end of copied text >      ECHO:< from: TTE Echo Complete w/o Contrast w/ Doppler (20 @ 09:24) >  Summary:   1. Endocardial visualization was enhanced withintravenous echo contrast.   2. Mildly enlarged left atrium.   3. Left ventricular ejection fraction, by visual estimation, is <20%.   4. The right atrium is normal in size.   5. The right ventricular size is mildly enlarged. Moderately reduced RV systolic function.   6. No significant valvular abnomality.   7. There is no evidence of pericardial effusion.   8. Dilatation of the aortic root and sinuses of Valsalva.   9. Comapred to study from 2020, there is no significant change in LV function.    MD Rona, RPVI Electronically signed on 2020 at 4:05:19 PM              *** Final ***              < end of copied text >

## 2020-07-21 NOTE — PROGRESS NOTE ADULT - ASSESSMENT
Assessment  s/p VT ablation  bradycardia/asystolic arrest with temp pacer in place-for BiV ICD today  ischemic CM s/p recent PCI, no evidence of CHF    Rec  pt to remain sedated/intubated awaiting BiV ICD today  resume beta blocekr/entresto  after BiV ICD placed  ? dc amio post ablation - defer to EP

## 2020-07-22 NOTE — CHART NOTE - NSCHARTNOTEFT_GEN_A_CORE
PRELIMINARY EEG REPORT    EEG reviewed from  - 10:56 - 13:56  Date: 07-22-20    - No epileptiform pattern or seizure seen.      This preliminary read is based on fellow review. Final read pending attending review.    ____________________________________  René Payne MD PGY-5  Epilepsy Fellow

## 2020-07-22 NOTE — PROGRESS NOTE ADULT - SUBJECTIVE AND OBJECTIVE BOX
Patient is a 79y old  Male who presents with a chief complaint of VT (21 Jul 2020 08:21)    BRIEF HOSPITAL COURSE:   78 yo M PMH HTN, HLD, CAD, OA, CVA (10 years ago) now wheel chair bound from OA/CVA, admitted June 2020 for SVT that progressed to VT was successfully cardioverted, new onset ischemic cardiomyopathy s/p LHC and stent placement in 6/2020 presents to the ED c/o weakness and episodes of unresponsiveness beginning today.  Pt admitted to the ICU for VT/SVT, ischemic cardiomyopathy HFrEF <20% from previous admission,  CARLA pre-renal vs. ATN     Events last 24 hours:   s/p CRT-D yesterday. Extubated overnight. Remained on low dose pressors/ Off sedation. Afebrile. Wet cough    PAST MEDICAL & SURGICAL HISTORY:  Stroke  Osteoarthritis  Hyperlipidemia  Depression  HTN (hypertension)  S/P stroke due to cerebrovascular disease      Review of Systems:  Could not assess given sedation     Physical Examination:  ICU Vital Signs Last 24 Hrs  T(C): 37.8 (22 Jul 2020 08:00), Max: 37.8 (22 Jul 2020 08:00)  T(F): 100 (22 Jul 2020 08:00), Max: 100 (22 Jul 2020 08:00)  HR: 97 (22 Jul 2020 08:30) (59 - 97)  BP: 74/52 (22 Jul 2020 08:30) (67/44 - 136/68)  BP(mean): 59 (22 Jul 2020 08:30) (51 - 96)  ABP: --  ABP(mean): --  RR: 19 (22 Jul 2020 08:30) (6 - 24)  SpO2: 98% (22 Jul 2020 08:30) (95% - 100%)      Neuro: AAOx 2, moving all extremities    HEENT: Pupils equal, reactive to light, Moist oral mucosa    PULM: Occasional ronchi+     CVS: Regular rhythm and controlled rate, no murmurs, rubs, or gallops    ABD: Soft, nondistended, nontender, normoactive bowel sounds    EXT: No b/l LE edema, nontender with pedal pulse palpable     SKIN: Warm and well perfused, no acute rashes       MEDICATIONS  (STANDING):  aspirin  chewable 81 milliGRAM(s) Oral daily  atorvastatin 20 milliGRAM(s) Oral at bedtime  buPROPion XL . 150 milliGRAM(s) Oral daily  chlorhexidine 0.12% Liquid 15 milliLiter(s) Oral Mucosa every 12 hours  chlorhexidine 4% Liquid 1 Application(s) Topical <User Schedule>  clopidogrel Tablet 75 milliGRAM(s) Oral daily  melatonin 5 milliGRAM(s) Oral at bedtime  metoprolol tartrate 12.5 milliGRAM(s) Oral two times a day  norepinephrine Infusion 0.05 MICROgram(s)/kG/Min (9.28 mL/Hr) IV Continuous <Continuous>  nystatin Cream 1 Application(s) Topical two times a day  piperacillin/tazobactam IVPB.. 3.375 Gram(s) IV Intermittent every 8 hours    MEDICATIONS  (PRN):  acetaminophen   Tablet .. 650 milliGRAM(s) Oral every 6 hours PRN Temp greater or equal to 38C (100.4F), Moderate Pain (4 - 6)  traMADol 50 milliGRAM(s) Oral three times a day PRN Moderate Pain (4 - 6)

## 2020-07-22 NOTE — SWALLOW BEDSIDE ASSESSMENT ADULT - PHARYNGEAL PHASE
Within functional limits Multiple swallows/Cough post oral intake/Wet vocal quality post oral intake

## 2020-07-22 NOTE — SWALLOW BEDSIDE ASSESSMENT ADULT - SLP PERTINENT HISTORY OF CURRENT PROBLEM
78 y/o male with pmhx of HTN, HLD, CAD, PA, CVA 10 years ago now wheel chair bound due to OA and CVA, vtach in June 2020 that was successfully cardioverted, new ischemic cardiomyopathy with BENSON placed 6/2020 to LAD. Admitted on 7/16 again after life vest shocked him for vtach, taken to cath lab for another LAD stent with post cath course complicated by runs of NSVT, started on lidocaine infusion then eventual vtach ablation. During ablation he had a TVP placed for heart block after ablation.  he returned to cath lab for a biventricular PPM with ICD without complication. patient successfully extubated. started on zosyn for suspected aspiration pneumonia post code due to rising wbc and thick secretions.

## 2020-07-22 NOTE — SWALLOW BEDSIDE ASSESSMENT ADULT - SLP GENERAL OBSERVATIONS
pt received A&A in bed, 0x3, +02 via nc, Sp02 98-99% baseline and throughout, +dysarthria (baseline per pt from CVA 10 years ago), cooperative and agreeable to evaluation, pain reported as 0/10 pre/post evaluation

## 2020-07-22 NOTE — DISCHARGE NOTE PROVIDER - NSDCMRMEDTOKEN_GEN_ALL_CORE_FT
acetaminophen 325 mg oral tablet: 2 tab(s) orally every 6 hours, As needed, Temp greater or equal to 38C (100.4F), Mild Pain (1 - 3), Moderate Pain (4 - 6)  Albuterol (Eqv-ProAir HFA) 90 mcg/inh inhalation aerosol: 2 puff(s) inhaled every 6 hours, As Needed  amiodarone 200 mg oral tablet: 2 tab(s) orally every 12 hours. one more dose on 6/23 at 6 pm, and 2 doses and and pm dose on 6/24.  amiodarone 200 mg oral tablet: 1 tab(s) orally every 12 hours  Aspirin Enteric Coated 81 mg oral delayed release tablet: 1 tab(s) orally once a day  atorvastatin 20 mg oral tablet: 1 tab(s) orally once a day (at bedtime)  clopidogrel 75 mg oral tablet: 1 tab(s) orally once a day  metoprolol succinate 25 mg oral tablet, extended release: 1 tab(s) orally once a day  sacubitril-valsartan 24 mg-26 mg oral tablet: 1 tab(s) orally 2 times a day  tamsulosin 0.4 mg oral capsule: 1 cap(s) orally once a day  Wellbutrin  mg/24 hours oral tablet, extended release: 1 tab(s) orally once a day acetaminophen 325 mg oral tablet: 2 tab(s) orally every 6 hours, As needed, Temp greater or equal to 38C (100.4F), Mild Pain (1 - 3), Moderate Pain (4 - 6)  Albuterol (Eqv-ProAir HFA) 90 mcg/inh inhalation aerosol: 2 puff(s) inhaled every 6 hours, As Needed  atorvastatin 20 mg oral tablet: 1 tab(s) orally once a day (at bedtime)  clopidogrel 75 mg oral tablet: 1 tab(s) orally once a day  Eliquis 5 mg oral tablet: 1 tab(s) orally 2 times a day   metoprolol succinate 25 mg oral tablet, extended release: 1 tab(s) orally once a day  Protonix 40 mg oral delayed release tablet: 1 tab(s) orally once a day   tamsulosin 0.4 mg oral capsule: 1 cap(s) orally once a day  Wellbutrin  mg/24 hours oral tablet, extended release: 1 tab(s) orally once a day acetaminophen 325 mg oral tablet: 2 tab(s) orally every 6 hours, As needed, Temp greater or equal to 38C (100.4F), Mild Pain (1 - 3), Moderate Pain (4 - 6)  Albuterol (Eqv-ProAir HFA) 90 mcg/inh inhalation aerosol: 2 puff(s) inhaled every 6 hours, As Needed  atorvastatin 20 mg oral tablet: 1 tab(s) orally once a day (at bedtime)  clopidogrel 75 mg oral tablet: 1 tab(s) orally once a day  Eliquis 5 mg oral tablet: 1 tab(s) orally 2 times a day   midodrine 10 mg oral tablet: 1 tab(s) orally 3 times a day  Protonix 40 mg oral delayed release tablet: 1 tab(s) orally once a day   tamsulosin 0.4 mg oral capsule: 1 cap(s) orally once a day  Wellbutrin  mg/24 hours oral tablet, extended release: 1 tab(s) orally once a day

## 2020-07-22 NOTE — PROGRESS NOTE ADULT - ASSESSMENT
Sustained VT s/p ablation   Cardiac arrest, ROSC 3min   Acute hypoxic resp failure  CAD, s/p PCI (07/17)  ICM w/ EF < 25%  ? seizures     Patient seen and examined     Neuro: Neuro status likely back to normal. Brief episode of unresponsiveness /blank stare recently. CTH grossly normal. EEG pending  Cardiovascular: S/p LHC w/ PCI to LAC on 07/17. S/p VT ablation, 07/20 and CRT-D, 07/21. Stopped antiarrhythmics after ablation. On ASA, statin, low dose beta-blockers (with parameters). Remains on Levo  Resp/Chest: Maintain SpO2 > 92%. Extubated overnight.   GI/Nutrition: Diet: NPO for now. Bedside swallow before starting diet. IV PPI  ID: Started on empiric Abx for PNA  Renal: Avoid nephrotoxic agents. Strict I&O  Endocrinology: Maintain Blood sugar < 180. ISS as per protocol  Haem/Oncology:  DVT ppx w/ SCDs    Critical Care time: 35 minutes assessing presenting problems of acute illness that poses high probability of life threatening deterioration or end organ damage/dysfunction.  Medical decision making including Initiating plan of care, reviewing data, reviewing radiology, discussing with multidisciplinary team, non inclusive of procedures

## 2020-07-22 NOTE — DISCHARGE NOTE PROVIDER - HOSPITAL COURSE
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  He underwent another PCI to mLAD this admission, but continued to have episodes of prolonged VT.  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  Overnight 7/20/2020 TVP appeared to be capturing inappropriately on VVI w/rate of 30, intrinsic rate 50-60's.  TVP was turned off for fear of R on T precipitating an arrhythmia.  Pt subsequently underwent 2x 5-6 second pauses followed by asystolic arrest.  ACLS initiated, pt intubated, TVP turned back on and 1 epi given with ROSC.  Pt returned to EP lab 7/21/2020 for MDT CRT-D implant via left cephalic access.  Successfully extubated overnight 7/22/2020.  Noted to have new onset atrial flutter on 7/22/2020 AM.  Bedside atrial ATP through device performed by Dr Wright, with successful conversion to sinus rhythm however, pt now with recurrent atrial flutter.  NOAC held due to recent CRT-D implant and drop in H/H and PLTs. Pt downgraded to medicine service overnight.  HIT negative, h/h stable and CT abdomen negative for bleed/hematoma.    EP recommending starting eliquis and continue plavix, stop aspirin to avoid triple therapy.     zayas discontinued successfully. complete course of zosyn for suspected aspiration pneumonia.        dc planning 38 minutes.    vitals stable, (episodes of hypotension) afebrile. ros otherwise negative     aaox3 nad rrr s1s2 ctab soft abdomen no LE edema, left groin inguinal hernia.     wheelchair bound per patient. 79 year old male w/PMHx significant for CVA, ischemic cardiomyopathy HFrEF <20% with monomorphic VT, s/p PCI to ostial lad 6/2020 who presented to [facility] with syncope and recurrent VT.  He underwent another PCI to mLAD this admission, but continued to have episodes of prolonged VT.  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  Overnight 7/20/2020 TVP appeared to be capturing inappropriately on VVI w/rate of 30, intrinsic rate 50-60's.  TVP was turned off for fear of R on T precipitating an arrhythmia.  Pt subsequently underwent 2x 5-6 second pauses followed by asystolic arrest.  ACLS initiated, pt intubated, TVP turned back on and 1 epi given with ROSC.  Pt returned to EP lab 7/21/2020 for MDT CRT-D implant via left cephalic access.  Successfully extubated overnight 7/22/2020.  Noted to have new onset atrial flutter on 7/22/2020 AM.  Bedside atrial ATP through device performed by Dr Wright, with successful conversion to sinus rhythm however, pt now with recurrent atrial flutter.  NOAC held due to recent CRT-D implant and drop in H/H and PLTs. Pt downgraded to medicine service overnight.  HIT negative, h/h stable and CT abdomen negative for bleed/hematoma.    EP recommending starting eliquis and continue plavix, stop aspirin to avoid triple therapy.     zayas discontinued successfully. complete course of zosyn for suspected aspiration pneumonia.        dc planning 38 minutes. 79 year old male w/PMHx significant for CVA, ischemic cardiomyopathy HFrEF <20% with monomorphic VT, s/p PCI to ostial lad 6/2020 who presented to [facility] with syncope and recurrent VT.  He underwent another PCI to mLAD this admission, but continued to have episodes of prolonged VT.  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  Overnight 7/20/2020 TVP appeared to be capturing inappropriately on VVI w/rate of 30, intrinsic rate 50-60's.  TVP was turned off for fear of R on T precipitating an arrhythmia.  Pt subsequently underwent 2x 5-6 second pauses followed by asystolic arrest.  ACLS initiated, pt intubated, TVP turned back on and 1 epi given with ROSC.  Pt returned to EP lab 7/21/2020 for MDT CRT-D implant via left cephalic access.  Successfully extubated overnight 7/22/2020.  Noted to have new onset atrial flutter on 7/22/2020 AM.  Bedside atrial ATP through device performed by Dr Wright, with successful conversion to sinus rhythm however, pt now with recurrent atrial flutter.  NOAC held due to recent CRT-D implant and drop in H/H and PLTs. Pt downgraded to medicine service overnight.  HIT negative, h/h stable and CT abdomen negative for bleed/hematoma.    EP recommending starting eliquis and continue plavix, stop aspirin to avoid triple therapy.     zayas discontinued successfully. complete course of zosyn for suspected aspiration pneumonia.    patient was unable to cardiac BP meds like toprol due to hypotension, resumed midodrine with outpatient wean.        dc planning 38 minutes.

## 2020-07-22 NOTE — SWALLOW BEDSIDE ASSESSMENT ADULT - SWALLOW EVAL: RECOMMENDED FEEDING/EATING TECHNIQUES
crush medication (when feasible)/maintain upright posture during/after eating for 30 mins/small sips/bites/no straws/oral hygiene/position upright (90 degrees)

## 2020-07-22 NOTE — SWALLOW BEDSIDE ASSESSMENT ADULT - SWALLOW EVAL: DIAGNOSIS
Oral and pharyngeal stages of swallow functional for puree (pt's preferred consistency) and nectar liquids. Suspect oropharyngeal dysphagia with thin liquids with overt s/s aspiration at bedside

## 2020-07-22 NOTE — DISCHARGE NOTE PROVIDER - NSDCFUSCHEDAPPT_GEN_ALL_CORE_FT
SASKIA LEIAS ; 08/18/2020 ; [facility] [address] [place] [address] SASKIA ELIAS ; 08/18/2020 ; [facility] [address] [place] [address]

## 2020-07-22 NOTE — PROGRESS NOTE ADULT - SUBJECTIVE AND OBJECTIVE BOX
Patient is a 79y old  Male who presents with a chief complaint of s/p CRT-D implant (21 Jul 2020 19:17)      BRIEF HOSPITAL COURSE: 80 y/o male with pmhx of HTN, HLD, CAD, PA, CVA 10 years ago now wheel chair bound due to OA and CVA, vtach in June 2020 that was successfully cardioverted, new ischemic cardiomyopathy with BENSON placed 6/2020 to LAD. Admitted on 7/16 again after life vest shocked him for vtach, taken to cath lab for another LAD stent with post cath course complicated by runs of NSVT, started on lidocaine infusion then eventual vtach ablation. During ablation he had a TVP placed for heart block after ablation. On 7/20 his TVP was not capturing well and there was concern for development of R on T phenomenon therefore it was turned off as his intrinsic rate was in the 50s and he was hemodynamically stable. Early 7/21 patient had a 6 second pause with subsequent asystolic arrest with ROSC achieved after about 2 minutes after TVP was turned back on and 1 epi with CPR. Intubated during code. Following commands post arrest. started on levophed.     Today he returned to cath lab for a biventricular PPM with ICD without complication. remains intubated and on levophed.  increased thick secretions with rising WBC, afebrile.    PAST MEDICAL & SURGICAL HISTORY:  Stroke  Osteoarthritis  Hyperlipidemia  Depression  HTN (hypertension)  S/P stroke due to cerebrovascular disease      Review of Systems:  unable to obtain due to intubation.      Medications:  piperacillin/tazobactam IVPB.. 3.375 Gram(s) IV Intermittent every 8 hours    lisinopril 5 milliGRAM(s) Oral daily  metoprolol tartrate 12.5 milliGRAM(s) Oral two times a day  norepinephrine Infusion 0.05 MICROgram(s)/kG/Min IV Continuous <Continuous>      acetaminophen   Tablet .. 650 milliGRAM(s) Oral every 6 hours PRN  buPROPion XL . 150 milliGRAM(s) Oral daily  melatonin 5 milliGRAM(s) Oral at bedtime  propofol Infusion 20 MICROgram(s)/kG/Min IV Continuous <Continuous>  traMADol 50 milliGRAM(s) Oral three times a day PRN      aspirin  chewable 81 milliGRAM(s) Oral daily  clopidogrel Tablet 75 milliGRAM(s) Oral daily    pantoprazole  Injectable 40 milliGRAM(s) IV Push daily      atorvastatin 20 milliGRAM(s) Oral at bedtime        chlorhexidine 0.12% Liquid 15 milliLiter(s) Oral Mucosa every 12 hours  chlorhexidine 4% Liquid 1 Application(s) Topical <User Schedule>  nystatin Cream 1 Application(s) Topical two times a day        Mode: CPAP with PS  FiO2: 30  PEEP: 5  PS: 5  MAP: 8      ICU Vital Signs Last 24 Hrs  T(C): 37.3 (21 Jul 2020 23:20), Max: 37.3 (21 Jul 2020 23:20)  T(F): 99.1 (21 Jul 2020 23:20), Max: 99.1 (21 Jul 2020 23:20)  HR: 91 (21 Jul 2020 23:44) (0 - 91)  BP: 111/68 (21 Jul 2020 20:00) (67/44 - 180/84)  BP(mean): 85 (21 Jul 2020 20:00) (51 - 121)  ABP: --  ABP(mean): --  RR: 21 (21 Jul 2020 20:00) (12 - 28)  SpO2: 99% (21 Jul 2020 23:44) (88% - 100%)      ABG - ( 21 Jul 2020 01:08 )  pH, Arterial: 7.32  pH, Blood: x     /  pCO2: 44    /  pO2: 189   / HCO3: 21    / Base Excess: -3.6  /  SaO2: 100                 I&O's Detail    20 Jul 2020 07:01  -  21 Jul 2020 07:00  --------------------------------------------------------  IN:    norepinephrine Infusion: 94.3 mL    Oral Fluid: 50 mL    propofol Infusion: 112.5 mL    Solution: 100 mL    Solution: 50 mL  Total IN: 406.8 mL    OUT:    Indwelling Catheter - Urethral: 3050 mL    Intermittent Catheterization - Urethral: 1500 mL  Total OUT: 4550 mL    Total NET: -4143.2 mL      21 Jul 2020 07:01  -  22 Jul 2020 00:01  --------------------------------------------------------  IN:    norepinephrine Infusion: 25 mL    propofol Infusion: 29.8 mL  Total IN: 54.8 mL    OUT:  Total OUT: 0 mL    Total NET: 54.8 mL            LABS:                        12.0   16.05 )-----------( 150      ( 21 Jul 2020 05:09 )             36.7     07-21    136  |  102  |  21.0<H>  ----------------------------<  136<H>  4.9   |  22.0  |  1.05    Ca    8.8      21 Jul 2020 05:09  Phos  3.7     07-21  Mg     1.9     07-21    TPro  5.6<L>  /  Alb  3.3  /  TBili  0.4  /  DBili  x   /  AST  33  /  ALT  11  /  AlkPhos  74  07-21          CAPILLARY BLOOD GLUCOSE      POCT Blood Glucose.: 131 mg/dL (20 Jul 2020 23:01)        CULTURES:      Physical Examination:    General: No acute distress.      HEENT: Pupils equal, reactive to light.  Symmetric.    PULM: Clear to auscultation bilaterally, no significant sputum production    NECK: Supple, no lymphadenopathy, trachea midline    CVS: Regular rate and rhythm, no murmurs, rubs, or gallops    ABD: Soft, nondistended, nontender, normoactive bowel sounds, no masses    EXT: No edema, nontender    SKIN: Warm and well perfused, no rashes noted.    NEURO: Alert, oriented, interactive, nonfocal    DEVICES: BiV pacemaker and ICD. zayas catheter    RADIOLOGY: reviewed    CRITICAL CARE TIME SPENT: 37 minutes of critical care time spent providing medical care for patient's acute illness/conditions that impairs at least one vital organ system and/or poses a high risk of imminent or life threatening deterioration in the patient's condition. It includes time spent evaluating and treating the patient's acute illness as well as time spent reviewing labs, radiology, discussing goals of care with patient and/or patient's family, and discussing the case with a multidisciplinary team in an effort to prevent further life threatening deterioration or end organ damage. This time is independent of any procedures performed.

## 2020-07-22 NOTE — DISCHARGE NOTE PROVIDER - CARE PROVIDER_API CALL
Sergo Wright)  Cardiac Electrophysiology; Cardiovascular Disease; Internal Medicine  [facility]– [place]note [address] [place] [address]  Phone: [phone]  Fax: [phone]  Follow Up Time: Sergo Wright)  Cardiac Electrophysiology; Cardiovascular Disease; Internal Medicine  [address] [place] [address]  Phone: [phone]  Fax: [phone]  Follow Up Time:     Mabel Dias  INTERNAL MEDICINE  [address] [place] [id number]  Phone: [phone]  Fax: [phone]  Follow Up Time:

## 2020-07-22 NOTE — PROGRESS NOTE ADULT - SUBJECTIVE AND OBJECTIVE BOX
[facility] - [facility]                                   [address] [place] [address]                                                      PHONE: [phone]                                                         FAX: [phone]  [email or web address]  ---------------------------------------------------------------------------------------------------------------------------------    Overnight events/patient complaints:  Pt feels well, tired, still on pressors    No Known Allergies    MEDICATIONS  (STANDING):  aspirin  chewable 81 milliGRAM(s) Oral daily  atorvastatin 20 milliGRAM(s) Oral at bedtime  buPROPion XL . 150 milliGRAM(s) Oral daily  chlorhexidine 0.12% Liquid 15 milliLiter(s) Oral Mucosa every 12 hours  chlorhexidine 4% Liquid 1 Application(s) Topical <User Schedule>  clopidogrel Tablet 75 milliGRAM(s) Oral daily  melatonin 5 milliGRAM(s) Oral at bedtime  metoprolol tartrate 12.5 milliGRAM(s) Oral two times a day  norepinephrine Infusion 0.05 MICROgram(s)/kG/Min (9.28 mL/Hr) IV Continuous <Continuous>  nystatin Cream 1 Application(s) Topical two times a day  piperacillin/tazobactam IVPB.. 3.375 Gram(s) IV Intermittent every 8 hours    MEDICATIONS  (PRN):  acetaminophen   Tablet .. 650 milliGRAM(s) Oral every 6 hours PRN Temp greater or equal to 38C (100.4F), Moderate Pain (4 - 6)  traMADol 50 milliGRAM(s) Oral three times a day PRN Moderate Pain (4 - 6)      Vital Signs Last 24 Hrs  T(C): 37.8 (22 Jul 2020 08:00), Max: 37.8 (22 Jul 2020 08:00)  T(F): 100 (22 Jul 2020 08:00), Max: 100 (22 Jul 2020 08:00)  HR: 65 (22 Jul 2020 09:15) (59 - 97)  BP: 80/49 (22 Jul 2020 09:15) (67/44 - 136/68)  BP(mean): 58 (22 Jul 2020 09:15) (51 - 96)  RR: 20 (22 Jul 2020 09:15) (6 - 24)  SpO2: 96% (22 Jul 2020 09:15) (95% - 100%)  ICU Vital Signs Last 24 Hrs  SASKIA ELIAS  I&O's Detail    21 Jul 2020 07:01  -  22 Jul 2020 07:00  --------------------------------------------------------  IN:    norepinephrine Infusion: 118 mL    propofol Infusion: 29.8 mL    Solution: 50 mL    Solution: 100 mL  Total IN: 297.8 mL    OUT:    Indwelling Catheter - Urethral: 750 mL  Total OUT: 750 mL    Total NET: -452.2 mL      22 Jul 2020 07:01  -  22 Jul 2020 09:31  --------------------------------------------------------  IN:    norepinephrine Infusion: 31.6 mL    Solution: 50 mL    Solution: 50 mL  Total IN: 131.6 mL    OUT:    Indwelling Catheter - Urethral: 225 mL  Total OUT: 225 mL    Total NET: -93.4 mL        Drug Dosing Weight  SASKIA ELIAS  Mode: CPAP with PS, FiO2: 30, PEEP: 5, PS: 5, MAP: 8    PHYSICAL EXAM:  General: Appears well developed, well nourished alert and cooperative.  HEENT: Head; normocephalic, atraumatic.  Eyes: Pupils reactive, cornea wnl.  Neck: Supple, no nodes adenopathy, no NVD or carotid bruit or thyromegaly.  CARDIOVASCULAR: Normal S1 and S2, No murmur, rub, gallop or lift.   LUNGS: No rales, rhonchi or wheeze. Normal breath sounds bilaterally.  ABDOMEN: Soft, nontender without mass or organomegaly. bowel sounds normoactive.  EXTREMITIES: No clubbing, cyanosis or edema. Distal pulses wnl.   SKIN: warm and dry with normal turgor.  NEURO: Alert/oriented x 3/normal motor exam. No pathologic reflexes.    PSYCH: normal affect.        LABS:                        10.6   16.46 )-----------( 117      ( 22 Jul 2020 04:45 )             32.4     07-22    137  |  102  |  18.0  ----------------------------<  115<H>  4.3   |  24.0  |  0.98    Ca    8.2<L>      22 Jul 2020 04:45  Phos  2.6     07-22  Mg     1.6     07-22    TPro  5.6<L>  /  Alb  3.3  /  TBili  0.4  /  DBili  x   /  AST  33  /  ALT  11  /  AlkPhos  74  07-21    [facility]            RADIOLOGY & ADDITIONAL STUDIES:    INTERPRETATION OF TELEMETRY (personally reviewed): afib paced       ECHO: < from: TTE Echo Complete w/o Contrast w/ Doppler (07.17.20 @ 09:24) >    Summary:   1. Endocardial visualization was enhanced withintravenous echo contrast.   2. Mildly enlarged left atrium.   3. Left ventricular ejection fraction, by visual estimation, is <20%.   4. The right atrium is normal in size.   5. The right ventricular size is mildly enlarged. Moderately reduced RV systolic function.   6. No significant valvular abnomality.   7. There is no evidence of pericardial effusion.   8. Dilatation of the aortic root and sinuses of Valsalva.   9. Comapred to study from 6/2020, there is no significant change in LV function.    MD Rona, RPVI Electronically signed on 7/17/2020 at 4:05:19 PM    < end of copied text >      STRESS TEST:    CARDIAC CATHETERIZATION: < from: Cardiac Cath Lab - Adult (07.17.20 @ 12:22) >  LAD:   --  Proximal LAD: There was a 0 % stenosis at the site of a prior  stent.  --  Mid LAD: There was a 70 % stenosis.  CX:   --  Proximal circumflex: Angiography showed minor luminal  irregularities with no flow limiting lesions.  --  Mid circumflex: Angiography showed minor luminal irregularities with no  flow limiting lesions.  --  Distal circumflex: Angiography showed minor luminal irregularities with  no flow limiting lesions.  --  L AV groove: Angiography showed minor luminal irregularities with no  flow limiting lesions.  RCA:   --  Proximal RCA: Angiography showed minor luminal irregularities  with no flow limiting lesions.  --  Mid RCA: There was a 40 % stenosis.  COMPLICATIONS: There were no complications. No complications occurred  during the cath lab visit.  SUMMARY:  HEMODYNAMICS: Hemodynamic assessment demonstrates mildly elevated LVEDP.  DIAGNOSTIC IMPRESSIONS: Patent ostial LAD stent.  IVUS and iFR of LAD shoed 70% mid LAD stenosis with positive iFR s/p IVUS  assisted PTCA and 3.0 x 34 BENSON postdilated proximally to 4.5mm.  DIAGNOSTIC RECOMMENDATIONS: Medical therapy for CAD  ICD evaluation for sustained VT.  INTERVENTIONAL IMPRESSIONS: Patent ostialLAD stent.  IVUS and iFR of LAD shoed 70% mid LAD stenosis with positive iFR s/p IVUS  assisted PTCA and 3.0 x 34 BENSON postdilated proximally to 4.5mm.  INTERVENTIONAL RECOMMENDATIONS: Medical therapy for CAD  ICD evaluation for sustained VT.  Prepared and signed by  Roger Robert MD  Signed 07/17/2020 13:54:55    < end of copied text >      ASSESSMENT AND PLAN:  In summary, SASKIA ELIAS is an 79y Male with past medical history significant for HTN, HLD, CVA, CAD sp BENSON to LAD aw recurrent VT sp ablation and course complicated by asystolic arrest/TVP and subsequent BIV-ICD.    1) Wean from levophed as tolerated  2) cw metoprolol  3) Closely monitor   4) No further amio  5) ICU care  6) cw ASA, plavix, statin

## 2020-07-22 NOTE — SWALLOW BEDSIDE ASSESSMENT ADULT - ASR SWALLOW ASPIRATION MONITOR
oral hygiene/pneumonia/cough/gurgly voice/upper respiratory infection/fever/throat clearing/position upright (90Y)/change of breathing pattern

## 2020-07-22 NOTE — PROGRESS NOTE ADULT - ASSESSMENT
80 y/o male with pmhx of HTN, HLD, CAD, PA, CVA 10 years ago now wheel chair bound due to OA and CVA, vtach in June 2020 that was successfully cardioverted, new ischemic cardiomyopathy with BENSON placed 6/2020 to LAD. Admitted on 7/16 again after life vest shocked him for vtach, taken to cath lab for another LAD stent with post cath course complicated by runs of NSVT, started on lidocaine infusion then eventual vtach ablation. During ablation he had a TVP placed for heart block after ablation. On 7/20 his TVP was not capturing well and there was concern for development of R on T phenomenon therefore it was turned off as his intrinsic rate was in the 50s and he was hemodynamically stable. Early 7/21 patient had a 6 second pause with subsequent asystolic arrest with ROSC achieved after about 2 minutes after TVP was turned back on and 1 epi with CPR. Intubated during code. Following commands post arrest. started on levophed.     Today he returned to cath lab for a biventricular PPM with ICD without complication. remains intubated and on levophed.  increased thick secretions with rising WBC, afebrile.      Plan:  sedation held, placed on SBT and did well and patient successfully extubated. started on zosyn for suspected aspiration pneumonia post code due to rising wbc and thick secretions. sent sputum culture prior to extubation. continue ancef as well for post operative course.     hold dvt prophylaxis per EP. continue metoprolol and lisinopril.     titrating levophed for MAP 65-70. possibly myocardial stunning post arrest vs distributive shock secondary to sedation. continue to wean. 80 y/o male with pmhx of HTN, HLD, CAD, PA, CVA 10 years ago now wheel chair bound due to OA and CVA, vtach in June 2020 that was successfully cardioverted, new ischemic cardiomyopathy with BENSON placed 6/2020 to LAD. Admitted on 7/16 again after life vest shocked him for vtach, taken to cath lab for another LAD stent with post cath course complicated by runs of NSVT, started on lidocaine infusion then eventual vtach ablation. During ablation he had a TVP placed for heart block after ablation. On 7/20 his TVP was not capturing well and there was concern for development of R on T phenomenon therefore it was turned off as his intrinsic rate was in the 50s and he was hemodynamically stable. Early 7/21 patient had a 6 second pause with subsequent asystolic arrest with ROSC achieved after about 2 minutes after TVP was turned back on and 1 epi with CPR. Intubated during code. Following commands post arrest. started on levophed.     Today he returned to cath lab for a biventricular PPM with ICD without complication. remains intubated and on levophed.  increased thick secretions with rising WBC, afebrile.      Plan:  sedation held, placed on SBT and did well and patient successfully extubated. started on zosyn for suspected aspiration pneumonia post code due to rising wbc and thick secretions. sent sputum culture prior to extubation. continue ancef as well for post operative course.     hold dvt prophylaxis per EP. hold metoprolol and lisinopril while still in shock.    titrating levophed for MAP 65-70. possibly myocardial stunning post arrest vs distributive shock secondary to sedation. continue to wean. 80 y/o male with pmhx of HTN, HLD, CAD, PA, CVA 10 years ago now wheel chair bound due to OA and CVA, vtach in June 2020 that was successfully cardioverted, new ischemic cardiomyopathy with BENSON placed 6/2020 to LAD. Admitted on 7/16 again after life vest shocked him for vtach, taken to cath lab for another LAD stent with post cath course complicated by runs of NSVT, started on lidocaine infusion then eventual vtach ablation. During ablation he had a TVP placed for heart block after ablation. On 7/20 his TVP was not capturing well and there was concern for development of R on T phenomenon therefore it was turned off as his intrinsic rate was in the 50s and he was hemodynamically stable. Early 7/21 patient had a 6 second pause with subsequent asystolic arrest with ROSC achieved after about 2 minutes after TVP was turned back on and 1 epi with CPR. Intubated during code. Following commands post arrest. started on levophed.     Today he returned to cath lab for a biventricular PPM with ICD without complication. remains intubated and on levophed.  increased thick secretions with rising WBC, afebrile.      Plan:  sedation held, placed on SBT and did well and patient successfully extubated. started on zosyn for suspected aspiration pneumonia post code due to rising wbc and thick secretions. sent sputum culture prior to extubation. continue ancef as well for post operative course.     hold dvt prophylaxis per EP. hold metoprolol and lisinopril while still in shock. unable to start midodrine due to poor swallow    titrating levophed for MAP 65-70. possibly myocardial stunning post arrest vs distributive shock secondary to sedation. continue to wean.

## 2020-07-22 NOTE — DISCHARGE NOTE PROVIDER - NSDCCPTREATMENT_GEN_ALL_CORE_FT
PRINCIPAL PROCEDURE  Procedure: Cardiac ablation  Findings and Treatment: - Bruising at the groin, sometimes extending down the leg, and/or a small lump under the skin at the groin access site is normal and will resolve within 2 – 3 weeks.   - Occasional skipped beats or palpitations that last for a few beats are common and generally resolve within 1-2 months.   - You make walk and take stairs at a regular pace.   - Do not perform any exercise more strenuous than walking for 1 week.   - Do not strain or lift heavy objects for 1 week.  - You may shower the day after the procedure.  - Do not soak in water (such as tub baths, hot tubs, swimming, etc.) for 1 week.   - You may resume all other activities the day after the procedure.  Call your doctor if:   - you notice bleeding, redness, drainage, swelling, increased tenderness or a hot sensation around the catheter insertion site.   - your temperature is greater than 100 degrees F for more than 24 hours.  - your rapid heart rhythm returns.  - you have any questions or concerns regarding the procedure.  If significant bleeding and/or a large lump (the size of a golf ball or bigger) occurs:  - Lie flat and apply continuous direct pressure just above the puncture site for at least 10 minutes  - If the issue resolves, notify your physician immediately.    - If the bleeding cannot be controlled, please seek immediate medical attention.  If you experience increased difficulty breathing or chest pain, or if you faint or have dizzy spells, please seek immediate medical attention.        SECONDARY PROCEDURE  Procedure: Implantation of biventricular defibrillator  Findings and Treatment: Post Operative Device Instructions  - Continue GDMT including metoprolol and lisinopril  - Bruising around the implant site or over the chest, side or arm near the incision is normal, and will take a few weeks to resolve.  -Do not lift the affected arm higher than 90 degrees (shoulder height) in any direction for 6 weeks.   - Do not push, pull or lift anything heavier than 10 lbs (about a gallon of milk) with the affected arm for 4 weeks.     -keep site dry for one week  - Do not touch the incision until it is completely healed.   - There are Steristrips (white strips of tape) on your incision, which will start to peel off on their own over the next 2-3 weeks. Do not pick at or peel off the Steristrips.   - Do not apply soaps, creams, lotions, ointments or powders to the incision until it is completely healed.  You should call the doctor if:   - you notice redness, drainage, swelling, increased tenderness, hot sensation around the  incision, bleeding or incision edges pulling apart.  - your temperature is greater than 100 degress F for more than 24 hours.  - you notice swelling or bulging at the incision or around the device that was not there when you left the hospital or is increasing in size.  -you experience increased difficulty breathing.  - you notice new/worsening swelling in your legs and ankles.  - you faint or have dizzy spells.  -you have any questions or concerns regarding your device or the procedure.

## 2020-07-22 NOTE — AIRWAY REMOVAL NOTE  ADULT & PEDS - ARTIFICAL AIRWAY REMOVAL COMMENTS
Patient extubated successfully to aerosol mask 30% as per orders, reason for artificial airway has been resolved. Patient tolerating well no signs of respiratory distress noted, spo2 100%, Hr 79 bpm, Rr 16 bpm will continue to monitor.

## 2020-07-22 NOTE — DISCHARGE NOTE PROVIDER - NSDCCPCAREPLAN_GEN_ALL_CORE_FT
PRINCIPAL DISCHARGE DIAGNOSIS  Diagnosis: Ventricular tachycardia  Assessment and Plan of Treatment: defibrillator placed   follow up with cardiology in 1 week.        SECONDARY DISCHARGE DIAGNOSES  Diagnosis: Atrial flutter  Assessment and Plan of Treatment: eliquis initiated   outpatient ablation    Diagnosis: Coronary disease  Assessment and Plan of Treatment: STOP aspirin.  contine clopidogrel (plavix) and eliquis    Diagnosis: Cardiac arrest  Assessment and Plan of Treatment: PRINCIPAL DISCHARGE DIAGNOSIS  Diagnosis: Ventricular tachycardia  Assessment and Plan of Treatment: defibrillator placed   follow up with cardiology in 1 week.        SECONDARY DISCHARGE DIAGNOSES  Diagnosis: Acute hypotension  Assessment and Plan of Treatment: continue midodrine.  stop metoprolol    Diagnosis: Atrial flutter  Assessment and Plan of Treatment: eliquis initiated   outpatient ablation    Diagnosis: Coronary disease  Assessment and Plan of Treatment: STOP aspirin.  contine clopidogrel (plavix) and eliquis    Diagnosis: Cardiac arrest  Assessment and Plan of Treatment:

## 2020-07-22 NOTE — DISCHARGE NOTE PROVIDER - PROVIDER TOKENS
PROVIDER:[TOKEN:[[id number]]] PROVIDER:[TOKEN:[[id number]]],PROVIDER:[TOKEN:[[id number]]]

## 2020-07-23 NOTE — PROGRESS NOTE ADULT - SUBJECTIVE AND OBJECTIVE BOX
Overnight events:  Pt doing well this morning, POD#2 s/p MDT CRT-D implant, c/o pain at incision site.  Pressure dressing removed without difficulty, pt tolerated well.  Denies other complaints, asking when he can go home.    Pt noted to be in atrial flutter again this morning.  Levophed discontinued overnight.  RN states hypotension after receiving morphine but BP stable at this time.  Continues Zosyn for probable aspiration pneumonia.      ECG:   TELE: atrial flutter with biventricular pacing     PAST MEDICAL & SURGICAL HISTORY:  Stroke  Osteoarthritis  Hyperlipidemia  Depression  HTN (hypertension)  S/P stroke due to cerebrovascular disease    MEDICATIONS  (STANDING):  aspirin  chewable 81 milliGRAM(s) Oral daily  atorvastatin 20 milliGRAM(s) Oral at bedtime  buPROPion XL . 150 milliGRAM(s) Oral daily  chlorhexidine 0.12% Liquid 15 milliLiter(s) Oral Mucosa every 12 hours  chlorhexidine 4% Liquid 1 Application(s) Topical <User Schedule>  clopidogrel Tablet 75 milliGRAM(s) Oral daily  melatonin 5 milliGRAM(s) Oral at bedtime  metoprolol tartrate 12.5 milliGRAM(s) Oral two times a day  midodrine 15 milliGRAM(s) Oral every 8 hours  norepinephrine Infusion 0.05 MICROgram(s)/kG/Min (9.28 mL/Hr) IV Continuous <Continuous>  nystatin Cream 1 Application(s) Topical two times a day  piperacillin/tazobactam IVPB.. 3.375 Gram(s) IV Intermittent every 8 hours  senna 2 Tablet(s) Oral at bedtime    MEDICATIONS  (PRN):  acetaminophen   Tablet .. 650 milliGRAM(s) Oral every 6 hours PRN Temp greater or equal to 38C (100.4F), Moderate Pain (4 - 6)  traMADol 50 milliGRAM(s) Oral three times a day PRN Moderate Pain (4 - 6)    Allergies:  No Known Allergies    Vital Signs Last 24 Hrs  T(C): 37.3 (2020 08:03), Max: 37.8 (2020 11:43)  T(F): 99.1 (2020 08:03), Max: 100 (2020 11:43)  HR: 81 (2020 08:30) (60 - 92)  BP: 88/61 (2020 08:30) (77/47 - 132/80)  BP(mean): 70 (2020 08:30) (57 - 100)  RR: 27 (2020 08:30) (15 - 30)  SpO2: 96% (2020 08:30) (86% - 100%)    Physical Exam:  Constitutional: NAD, AAOx3  Cardiovascular: +S1S2 RRR; LACW steri-strips intact, no hematoma, swelling or bleeding noted  Pulmonary: CTA b/l, unlabored  GI: soft NTND +BS  Extremities: no pedal edema, +distal pulses b/l; + scrotal edema   Groins: soft, + ecchymosis, no bleeding or hematoma noted   Neuro: non focal, SABA x4  Right IJ in place, site benign; LUE midline intact  + zayas catheter >valdez urine     LABS:                        9.6    10.28 )-----------( 76       ( 2020 04:52 )             29.5     07-23    138  |  104  |  25.0<H>  ----------------------------<  134<H>  3.9   |  26.0  |  0.84    Ca    8.2<L>      2020 04:52  Phos  3.0     07-23  Mg     1.7     07-23    Urinalysis Basic - ( 2020 10:47 )    Color: Yellow / Appearance: Clear / S.025 / pH: x  Gluc: x / Ketone: Trace  / Bili: Negative / Urobili: Negative mg/dL   Blood: x / Protein: 30 mg/dL / Nitrite: Negative   Leuk Esterase: Small / RBC: 11-25 /HPF / WBC 3-5   Sq Epi: x / Non Sq Epi: Negative / Bacteria: Negative    TTE: 2020:   Summary:   1. Endocardial visualization was enhanced with intravenous echo contrast.   2. Mildly enlarged left atrium.   3. Left ventricular ejection fraction, by visual estimation, is <20%.   4. The right atrium is normal in size.   5. The right ventricular size is mildly enlarged. Moderately reduced RV systolic function.   6. No significant valvular abnormality.   7. There is no evidence of pericardial effusion.   8. Dilatation of the aortic root and sinuses of Valsalva.   9. Compared to study from 2020, there is no significant change in LV function.  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  He underwent another PCI to mLAD this admission, but continued to have episodes of prolonged VT.  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  Overnight event in which TVP appeared to be capturing inappropriately on VVI w/rate of 30, intrinsic rate 50-60's.  TVP was turned off for fear of R on T precipitating an arrhythmia.  Pt subsequently underwent 2x 5-6 second pauses followed by asystolic arrest.  ACLS initiated, pt intubated, TVP turned back on and 1 epi given with ROSC.  Pt returned to EP lab 2020 for MDT CRT-D implant via cephalic access.  Successfully extubated overnight 2020.  Noted to have new onset atrial flutter on 2020 AM.  Bedside atrial ATP through device performed by Dr Wright, with successful conversion to sinus rhythm however, pt now with recurrent atrial flutter.      1. MDT CRT-D implant   -Pain control with PO analgesia PRN.   -pt weak and unable to have CXR PA/LAT at this time, will plan for quality portable CXR on 2020 and CXR PA/LAT prior to d/c   -Encourage de-lining patient as clinically feasible including RIJ, zayas catheter and LUE midline. Concern for device infection.  -NO HEPARIN OR LOVENOX, INCLUDING PROPHYLACTIC/SUBCUT DOSING, UNTIL OTHERWISE ADVISED BY EP.   -b/l LE compression device for DVT prophylaxis   -IV zosyn started secondary to concern for aspiration pneumonia   -Repeat CBC @ 2pm today   -will assess LACW site on 2020 and consider resuming Eliquis at that time   -no lifting left arm over shoulder x 6 weeks  -outpt follow up 2 weeks, [facility] for site and device check    2. New onset atrial flutter  -Bedside trial ATP through device performed by Dr. Wright with successful conversion to sinus rhythm on 2020.  Now with recurrent atrial flutter.  No further intervention for Aflutter at this time.    -Pending device site: Will start Eliquis 5mgPO BID on 2020 AM.  Will d/w interventionalist triple therapy in setting of recent PCI  -ultimately pt may benefit from atrial flutter ablation and enabling atrial ATP algorithms once pt is anticoagulated and leads have matured.    3. ICM/HFrEF  -cont GDMT - metoprolol. Lisinopril was held due to low BP however levophed now discontinued.  Continue to monitor BP and restart lisinopril when BP tolerates.      DW Dr. Wright, agrees Overnight events:  Pt doing well this morning, POD#2 s/p MDT CRT-D implant, c/o pain at incision site.  Pressure dressing removed without difficulty, pt tolerated well.  Denies other complaints, asking when he can go home.    Pt noted to be in atrial flutter again this morning.  Levophed discontinued overnight.  RN states hypotension after receiving morphine but BP stable at this time.  Continues Zosyn for probable aspiration pneumonia.      ECG: AFL with high grade AVB and appropriate BiV pacing  TELE: atrial flutter with biventricular pacing     PAST MEDICAL & SURGICAL HISTORY:  Stroke  Osteoarthritis  Hyperlipidemia  Depression  HTN (hypertension)  S/P stroke due to cerebrovascular disease    MEDICATIONS  (STANDING):  aspirin  chewable 81 milliGRAM(s) Oral daily  atorvastatin 20 milliGRAM(s) Oral at bedtime  buPROPion XL . 150 milliGRAM(s) Oral daily  chlorhexidine 0.12% Liquid 15 milliLiter(s) Oral Mucosa every 12 hours  chlorhexidine 4% Liquid 1 Application(s) Topical <User Schedule>  clopidogrel Tablet 75 milliGRAM(s) Oral daily  melatonin 5 milliGRAM(s) Oral at bedtime  metoprolol tartrate 12.5 milliGRAM(s) Oral two times a day  midodrine 15 milliGRAM(s) Oral every 8 hours  norepinephrine Infusion 0.05 MICROgram(s)/kG/Min (9.28 mL/Hr) IV Continuous <Continuous>  nystatin Cream 1 Application(s) Topical two times a day  piperacillin/tazobactam IVPB.. 3.375 Gram(s) IV Intermittent every 8 hours  senna 2 Tablet(s) Oral at bedtime    MEDICATIONS  (PRN):  acetaminophen   Tablet .. 650 milliGRAM(s) Oral every 6 hours PRN Temp greater or equal to 38C (100.4F), Moderate Pain (4 - 6)  traMADol 50 milliGRAM(s) Oral three times a day PRN Moderate Pain (4 - 6)    Allergies:  No Known Allergies    Vital Signs Last 24 Hrs  T(C): 37.3 (2020 08:03), Max: 37.8 (2020 11:43)  T(F): 99.1 (2020 08:03), Max: 100 (2020 11:43)  HR: 81 (2020 08:30) (60 - 92)  BP: 88/61 (2020 08:30) (77/47 - 132/80)  BP(mean): 70 (2020 08:30) (57 - 100)  RR: 27 (2020 08:30) (15 - 30)  SpO2: 96% (2020 08:30) (86% - 100%)    Physical Exam:  Constitutional: NAD, AAOx3  Cardiovascular: +S1S2 RRR; LACW steri-strips intact, no hematoma, swelling or bleeding noted  Pulmonary: CTA b/l, unlabored  GI: soft NTND +BS  Extremities: no pedal edema, +distal pulses b/l; + scrotal edema   Groins: soft, + ecchymosis, no bleeding or hematoma noted. No bruit.    Neuro: non focal, SABA x4  Right IJ in place, site benign; LUE midline intact  + zayas catheter >valdez urine     LABS:                        9.6    10.28 )-----------( 76       ( 2020 04:52 )             29.5     07-23    138  |  104  |  25.0<H>  ----------------------------<  134<H>  3.9   |  26.0  |  0.84    Ca    8.2<L>      2020 04:52  Phos  3.0     07-23  Mg     1.7     07-23    Urinalysis Basic - ( 2020 10:47 )    Color: Yellow / Appearance: Clear / S.025 / pH: x  Gluc: x / Ketone: Trace  / Bili: Negative / Urobili: Negative mg/dL   Blood: x / Protein: 30 mg/dL / Nitrite: Negative   Leuk Esterase: Small / RBC: 11-25 /HPF / WBC 3-5   Sq Epi: x / Non Sq Epi: Negative / Bacteria: Negative    TTE: 2020:   Summary:   1. Endocardial visualization was enhanced with intravenous echo contrast.   2. Mildly enlarged left atrium.   3. Left ventricular ejection fraction, by visual estimation, is <20%.   4. The right atrium is normal in size.   5. The right ventricular size is mildly enlarged. Moderately reduced RV systolic function.   6. No significant valvular abnormality.   7. There is no evidence of pericardial effusion.   8. Dilatation of the aortic root and sinuses of Valsalva.   9. Compared to study from 2020, there is no significant change in LV function.  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  He underwent another PCI to mLAD this admission, but continued to have episodes of prolonged VT.  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  Overnight event in which TVP appeared to be capturing inappropriately on VVI w/rate of 30, intrinsic rate 50-60's.  TVP was turned off for fear of R on T precipitating an arrhythmia.  Pt subsequently underwent 2x 5-6 second pauses followed by asystolic arrest.  ACLS initiated, pt intubated, TVP turned back on and 1 epi given with ROSC.  Pt returned to EP lab 2020 for MDT CRT-D implant via left cephalic access.  Successfully extubated overnight 2020.  Noted to have new onset atrial flutter on 2020 AM.  Bedside atrial ATP through device performed by Dr Wright, with successful conversion to sinus rhythm however, pt now with recurrent atrial flutter.      Overnight, continues to do better, off levo, and off oxygen. Back in rate controlled AFL, and not yet on AC given fresh CRTD implant. Device site examined and seems to be well healing with no any bruises or bleeding. Groins are fine with no symptoms or signs of bleeding, though there is drop in H/H, along with drop in PLT and WBC. HIT score 5 (intermediate probability and not on any heparin now). sputum culture shows Staph aureus, pending sensitivity       1. MDT CRT-D implant   -Pain control with PO analgesia PRN.   -pt weak and unable to have CXR PA/LAT at this time, will plan for quality portable CXR on 2020 and CXR PA/LAT prior to d/c if possible (baseline is wheelchair bound)  -Encourage de-lining patient as clinically feasible including RIJ, zayas catheter and LUE midline. Concern for device infection.  -NO HEPARIN OR LOVENOX, INCLUDING PROPHYLACTIC/SUBCUT DOSING, UNTIL OTHERWISE ADVISED BY EP.   -b/l LE compression device for DVT prophylaxis   -IV zosyn started secondary to concern for aspiration pneumonia   -Repeat CBC @ 2pm today to reassess all numbers.   -will assess [facility] site on 2020 and consider resuming Eliquis at that time   -no lifting left arm over shoulder x 6 weeks  -outpt follow up 2 weeks, [facility] for site and device check    2. New onset atrial flutter  -Bedside trial ATP through device performed by Dr. Wright with successful conversion to sinus rhythm on 2020.  Now with recurrent atrial flutter.  No further intervention for Aflutter at this time.    -Pending device site: Will start Eliquis 5mgPO BID on 2020 AM.  Will d/w interventionalist triple therapy in setting of recent PCI  -ultimately pt may benefit from atrial flutter ablation and enabling atrial ATP algorithms once pt is anticoagulated and leads have matured.    3. ICM/HFrEF  -cont GDMT - metoprolol. Lisinopril was held due to low BP however levophed now discontinued.  Continue to monitor BP and restart lisinopril when BP tolerates.      4. Possible Infection, seems responding to Pip/Ivan. Sputum culture shows SA, pending sensitivity.   - Please consider full 10 days course antibiotic, can be changed to oral upon discharge.   - Follow up S/C sensitivity  - Remove all lines/catheters ASAP    5. CBC results:  - No signs of active bleeding on examination, but need to repeat and follow H/H, suspect dilutional   - Drop in PLT, HIT score 5 (intermediate). Avoid all heparin, and monitor platelet. Will consider heme consult if HIT remained suspected.     Post procedures (CRTD and VT ablation) instructions given to the patient and primary team.      Seen with Dr. Wright, agrees

## 2020-07-23 NOTE — EEG REPORT - NS EEG TEXT BOX
[facility]   COMPREHENSIVE EPILEPSY CENTER   REPORT OF LONG-TERM VIDEO EEG     [facility]: [address], [place] [address], Ph#: [phone]  [facility]: [id number], [place] [address], Ph#: [phone]  [facility]: [address] [place] [address], Ph#: [phone]    Patient Name: SASKIA ELIAS  Age and : 79y ([birth year])  MRN #: [id number]  Location: [place] [address]  Referring Physician: Ed Jameson    Start Time/Date: 10:56 on 20  End Time/Date: 08:00 on 20  Duration: 21hr 4m    _____________________________________________________________  STUDY INFORMATION    EEG Recording Technique:  The patient underwent continuous Video-EEG monitoring, using Telemetry System hardware on the XLTek Digital System. EEG and video data were stored on a computer hard drive with important events saved in digital archive files. The material was reviewed by a physician (electroencephalographer / epileptologist) on a daily basis. Iglesia and seizure detection algorithms were utilized and reviewed. An EEG Technician attended to the patient, and was available throughout daytime work hours.  The epilepsy center neurologist was available in person or on call 24-hours per day.    EEG Placement and Labeling of Electrodes:  The EEG was performed utilizing 20 channel referential EEG connections (coronal over temporal over parasagittal montage) using all standard 10-20 electrode placements with EKG, with additional electrodes placed in the inferior temporal region using the modified 10-10 montage electrode placements for elective admissions, or if deemed necessary. Recording was at a sampling rate of 256 samples per second per channel. Time synchronized digital video recording was done simultaneously with EEG recording. A low light infrared camera was used for low light recording.     _____________________________________________________________  HISTORY    Patient is a 79y old  Male who presents with a chief complaint of per hpi (2020 09:58)      PERTINENT MEDICATION:  none    _____________________________________________________________  STUDY INTERPRETATION    Findings: The background was continuous, spontaneously variable and reactive. During wakefulness, the posterior dominant rhythm consisted of symmetric, poorly-modulated 8-8.5 Hz activity, with amplitude to 30 uV, that attenuated to eye opening.      Background Slowing:  No generalized background slowing was present.    Focal Slowing:   None were present.    Sleep Background:  Drowsiness was characterized by fragmentation, attenuation, and slowing of the background activity.    Sleep was characterized by the presence of vertex waves, symmetric sleep spindles and K-complexes.    Other Non-Epileptiform Findings:  None were present.    Interictal Epileptiform Activity:   None were present.    Events:  Clinical events: None recorded.  Seizures: None recorded.    Activation Procedures:   Hyperventilation was not performed.    Photic stimulation was not performed.     Artifacts:  Intermittent myogenic and movement artifacts were noted.    ECG:  The heart rate on single channel ECG was predominantly between 70-80 BPM.    _____________________________________________________________  EEG SUMMARY/CLASSIFICATION    Abnormal EEG in the awake, drowsy and asleep states.  - Mild generalized slowing.    _____________________________________________________________  EEG IMPRESSION/CLINICAL CORRELATE    Abnormal EEG study.  1. Mild nonspecific diffuse or multifocal cerebral dysfunction.   2. No epileptiform pattern or seizure seen.    _____________________________________________________________    Shama Sylvester MD  Attending Physician, [facility]

## 2020-07-23 NOTE — PROGRESS NOTE ADULT - SUBJECTIVE AND OBJECTIVE BOX
SASKIA ELIAS  ----------------------------------------  The patient was seen and evaluated for cardiac arrest. Offers no complaints, reports feeling well. Denied dyspnea.    Vital Signs Last 24 Hrs  T(C): 36.8 (2020 16:41), Max: 37.6 (2020 19:44)  T(F): 98.2 (2020 16:41), Max: 99.7 (2020 19:44)  HR: 78 (2020 16:00) (60 - 92)  BP: 122/63 (2020 16:00) (77/47 - 132/80)  BP(mean): 87 (2020 16:00) (57 - 100)  RR: 19 (2020 16:00) (16 - 30)  SpO2: 97% (2020 16:00) (86% - 100%)    PHYSICAL EXAMINATION:  ----------------------------------------  General appearance: No acute distress, Awake, Alert  HEENT: Normocephalic, Atraumatic, Conjunctiva clear, EOMI  Neck: Supple, No JVD, No tenderness, Right dressing clean an intact  Lungs: Breath sound equal bilaterally, No wheezes, Occasional rhonchi  Cardiovascular: S1S2, Regular rhythm  Abdomen: Soft, Nontender, Nondistended, No guarding/rebound, Positive bowel sounds  Extremities: No clubbing, No cyanosis, No edema, No calf tenderness  Neuro: Strength equal bilaterally, No tremors  Psychiatric: Appropriate mood, Normal affect    LABORATORY STUDIES:  ----------------------------------------             9.7    12.54 )-----------( 87       ( 2020 13:27 )             29.3     07-23    138  |  104  |  25.0<H>  ----------------------------<  134<H>  3.9   |  26.0  |  0.84    Ca    8.2<L>      2020 04:52  Phos  3.0     07-  Mg     1.7     07-23    Urinalysis Basic - ( 2020 10:47 )  Color: Yellow / Appearance: Clear / S.025 / pH: x  Gluc: x / Ketone: Trace  / Bili: Negative / Urobili: Negative mg/dL   Blood: x / Protein: 30 mg/dL / Nitrite: Negative   Leuk Esterase: Small / RBC: 11-25 /HPF / WBC 3-5   Sq Epi: x / Non Sq Epi: Negative / Bacteria: Negative    Culture - Sputum (collected 2020 06:41)  Source: .Sputum Sputum  Gram Stain (2020 11:24):    Numerous polymorphonuclear leukocytes per low power field    No Squamous epithelial cells per low power field    Rare Gram Positive Cocci in Pairs and Chains  Preliminary Report (2020 07:09):    Moderate Staphylococcus aureus    Normal Respiratory Rosana absent    MEDICATIONS  (STANDING):  aspirin  chewable 81 milliGRAM(s) Oral daily  atorvastatin 20 milliGRAM(s) Oral at bedtime  buPROPion XL . 150 milliGRAM(s) Oral daily  chlorhexidine 4% Liquid 1 Application(s) Topical <User Schedule>  clopidogrel Tablet 75 milliGRAM(s) Oral daily  melatonin 5 milliGRAM(s) Oral at bedtime  metoprolol tartrate 12.5 milliGRAM(s) Oral two times a day  midodrine 15 milliGRAM(s) Oral every 8 hours  nystatin Cream 1 Application(s) Topical two times a day  piperacillin/tazobactam IVPB.. 3.375 Gram(s) IV Intermittent every 8 hours  senna 2 Tablet(s) Oral at bedtime  tamsulosin 0.4 milliGRAM(s) Oral at bedtime    MEDICATIONS  (PRN):  acetaminophen   Tablet .. 650 milliGRAM(s) Oral every 6 hours PRN Temp greater or equal to 38C (100.4F), Moderate Pain (4 - 6)  traMADol 50 milliGRAM(s) Oral three times a day PRN Moderate Pain (4 - 6)      ASSESSMENT / PLAN:  ----------------------------------------  79M with history of stroke, depression, hypertension, and coronary artery disease who was previously admitted for ventricular tachycardia found to have cardiomyopathy and required percutaneous coronary intervention to the left anterior descending artery and was discharged home with an external defibrillator vest. The patient presented with episodes of syncope and alarms from the defibrillator vest found to have ventricular tachycardia. The patient was placed on antiarrhythmic medications and underwent VT ablation. He subsequently had cardiac arrest and required intubation with initiation of vasopressor support and empiric antibiotics. An AICD was implanted and the patient subsequently extubated.    Ventricular tachycardia / Cardiac arrest - Status post VT ablation and AICD implantation. Cardiology and Electrophysiology consultation noted. Metoprolol and lisinopril were held due to hypotension. Levophed weaned off and on midodrine. Monitoring blood pressures.    Coronary artery disease - Status post percutaneous coronary intervention. On aspirin and clopidogrel.     Atrial fibrillation - Paced rhythm on telemetry. To initiate anticoagulation once thought to be feasible by Electrophysiology.    Acute respiratory failure - Status post extubation. No dyspnea or hypoxia on examination. On empiric piperacillin/tazobactam.    Acute kidney injury - Renal function improved.    Depression - On bupropion. SASKIA ELIAS  ----------------------------------------  The patient was seen and evaluated for cardiac arrest. Offers no complaints, reports feeling well. Denied dyspnea.    Vital Signs Last 24 Hrs  T(C): 36.8 (2020 16:41), Max: 37.6 (2020 19:44)  T(F): 98.2 (2020 16:41), Max: 99.7 (2020 19:44)  HR: 78 (2020 16:00) (60 - 92)  BP: 122/63 (2020 16:00) (77/47 - 132/80)  BP(mean): 87 (2020 16:00) (57 - 100)  RR: 19 (2020 16:00) (16 - 30)  SpO2: 97% (2020 16:00) (86% - 100%)    PHYSICAL EXAMINATION:  ----------------------------------------  General appearance: No acute distress, Awake, Alert  HEENT: Normocephalic, Atraumatic, Conjunctiva clear, EOMI  Neck: Supple, No JVD, No tenderness, Right dressing clean an intact  Lungs: Breath sound equal bilaterally, No wheezes, Occasional rhonchi  Cardiovascular: S1S2, Regular rhythm  Abdomen: Soft, Nontender, Nondistended, No guarding/rebound, Positive bowel sounds  Extremities: No clubbing, No cyanosis, No edema, No calf tenderness  Neuro: Strength equal bilaterally, No tremors  Psychiatric: Appropriate mood, Normal affect    LABORATORY STUDIES:  ----------------------------------------             9.7    12.54 )-----------( 87       ( 2020 13:27 )             29.3     07-23    138  |  104  |  25.0<H>  ----------------------------<  134<H>  3.9   |  26.0  |  0.84    Ca    8.2<L>      2020 04:52  Phos  3.0     07-  Mg     1.7     07-23    Urinalysis Basic - ( 2020 10:47 )  Color: Yellow / Appearance: Clear / S.025 / pH: x  Gluc: x / Ketone: Trace  / Bili: Negative / Urobili: Negative mg/dL   Blood: x / Protein: 30 mg/dL / Nitrite: Negative   Leuk Esterase: Small / RBC: 11-25 /HPF / WBC 3-5   Sq Epi: x / Non Sq Epi: Negative / Bacteria: Negative    Culture - Sputum (collected 2020 06:41)  Source: .Sputum Sputum  Gram Stain (2020 11:24):    Numerous polymorphonuclear leukocytes per low power field    No Squamous epithelial cells per low power field    Rare Gram Positive Cocci in Pairs and Chains  Preliminary Report (2020 07:09):    Moderate Staphylococcus aureus    Normal Respiratory Rosana absent    MEDICATIONS  (STANDING):  aspirin  chewable 81 milliGRAM(s) Oral daily  atorvastatin 20 milliGRAM(s) Oral at bedtime  buPROPion XL . 150 milliGRAM(s) Oral daily  chlorhexidine 4% Liquid 1 Application(s) Topical <User Schedule>  clopidogrel Tablet 75 milliGRAM(s) Oral daily  melatonin 5 milliGRAM(s) Oral at bedtime  metoprolol tartrate 12.5 milliGRAM(s) Oral two times a day  midodrine 15 milliGRAM(s) Oral every 8 hours  nystatin Cream 1 Application(s) Topical two times a day  piperacillin/tazobactam IVPB.. 3.375 Gram(s) IV Intermittent every 8 hours  senna 2 Tablet(s) Oral at bedtime  tamsulosin 0.4 milliGRAM(s) Oral at bedtime    MEDICATIONS  (PRN):  acetaminophen   Tablet .. 650 milliGRAM(s) Oral every 6 hours PRN Temp greater or equal to 38C (100.4F), Moderate Pain (4 - 6)  traMADol 50 milliGRAM(s) Oral three times a day PRN Moderate Pain (4 - 6)      ASSESSMENT / PLAN:  ----------------------------------------  79M with history of stroke, depression, hypertension, and coronary artery disease who was previously admitted for ventricular tachycardia found to have cardiomyopathy and required percutaneous coronary intervention to the left anterior descending artery and was discharged home with an external defibrillator vest. The patient presented with episodes of syncope and alarms from the defibrillator vest found to have ventricular tachycardia. The patient was placed on antiarrhythmic medications and underwent VT ablation. He subsequently had cardiac arrest and required intubation with initiation of vasopressor support and empiric antibiotics. An AICD was implanted and the patient subsequently extubated.    Ventricular tachycardia / Cardiac arrest - Status post VT ablation and AICD implantation. Cardiology and Electrophysiology consultation noted. Metoprolol and lisinopril were held due to hypotension. Levophed weaned off and on midodrine. Monitoring blood pressures.    Coronary artery disease - Status post percutaneous coronary intervention. On aspirin and clopidogrel.     Atrial fibrillation - Paced rhythm on telemetry. To initiate anticoagulation once thought to be feasible by Electrophysiology.    Acute respiratory failure - Status post extubation. No dyspnea or hypoxia on examination. On empiric piperacillin/tazobactam.    Thrombocytopenia - Monitoring platelet count. No obvious bleeding noted.    Acute kidney injury - Renal function improved.    Depression - On bupropion.

## 2020-07-23 NOTE — PHYSICAL THERAPY INITIAL EVALUATION ADULT - ADDITIONAL COMMENTS
Pt. lives alone in an apartment with no stairs to enter and no stairs inside. Pt. reports he has been amb only from bed <--> w/c or performing independent SPT to w/c for past 5 years due to pain from OA. Pt. was independent with all PTA and owns a W/C, shower chair, and no other DME.

## 2020-07-23 NOTE — PROGRESS NOTE ADULT - SUBJECTIVE AND OBJECTIVE BOX
Patient is a 79y old  Male who presents with a chief complaint of VT (21 Jul 2020 08:21)    BRIEF HOSPITAL COURSE:   80 yo M PMH HTN, HLD, CAD, OA, CVA (10 years ago) now wheel chair bound from OA/CVA, admitted June 2020 for SVT that progressed to VT was successfully cardioverted, new onset ischemic cardiomyopathy s/p LHC and stent placement in 6/2020 presents to the ED c/o weakness and episodes of unresponsiveness beginning today.  Pt admitted to the ICU for VT/SVT, ischemic cardiomyopathy HFrEF <20% from previous admission,  CARLA pre-renal vs. ATN     Events last 24 hours:   No acute overnight events. EEG completed. Taken off Levo this morning. Afebrile. Wet cough    PAST MEDICAL & SURGICAL HISTORY:  Stroke  Osteoarthritis  Hyperlipidemia  Depression  HTN (hypertension)  S/P stroke due to cerebrovascular disease    Review of Systems:  Could not assess given sedation     Physical Examination:  ICU Vital Signs Last 24 Hrs  T(C): 37.6 (23 Jul 2020 12:01), Max: 37.6 (22 Jul 2020 19:44)  T(F): 99.7 (23 Jul 2020 12:01), Max: 99.7 (22 Jul 2020 19:44)  HR: 65 (23 Jul 2020 12:01) (60 - 92)  BP: 131/60 (23 Jul 2020 12:01) (77/47 - 132/80)  BP(mean): 86 (23 Jul 2020 12:01) (57 - 100)  ABP: --  ABP(mean): --  RR: 24 (23 Jul 2020 12:01) (15 - 30)  SpO2: 93% (23 Jul 2020 12:01) (86% - 100%)      Neuro: AAOx 2, moving all extremities    HEENT: Pupils equal, reactive to light, Moist oral mucosa    PULM: Occasional ronchi+     CVS: Regular rhythm and controlled rate, no murmurs, rubs, or gallops    ABD: Soft, nondistended, nontender, normoactive bowel sounds    EXT: No b/l LE edema, nontender with pedal pulse palpable     SKIN: Warm and well perfused, no acute rashes     MEDICATIONS  (STANDING):  aspirin  chewable 81 milliGRAM(s) Oral daily  atorvastatin 20 milliGRAM(s) Oral at bedtime  buPROPion XL . 150 milliGRAM(s) Oral daily  chlorhexidine 4% Liquid 1 Application(s) Topical <User Schedule>  clopidogrel Tablet 75 milliGRAM(s) Oral daily  melatonin 5 milliGRAM(s) Oral at bedtime  metoprolol tartrate 12.5 milliGRAM(s) Oral two times a day  midodrine 15 milliGRAM(s) Oral every 8 hours  norepinephrine Infusion 0.05 MICROgram(s)/kG/Min (9.28 mL/Hr) IV Continuous <Continuous>  nystatin Cream 1 Application(s) Topical two times a day  piperacillin/tazobactam IVPB.. 3.375 Gram(s) IV Intermittent every 8 hours  senna 2 Tablet(s) Oral at bedtime  tamsulosin 0.4 milliGRAM(s) Oral at bedtime    MEDICATIONS  (PRN):  acetaminophen   Tablet .. 650 milliGRAM(s) Oral every 6 hours PRN Temp greater or equal to 38C (100.4F), Moderate Pain (4 - 6)  traMADol 50 milliGRAM(s) Oral three times a day PRN Moderate Pain (4 - 6)

## 2020-07-23 NOTE — PROGRESS NOTE ADULT - SUBJECTIVE AND OBJECTIVE BOX
[place] [facility]                                   [address] [place] [address]                                                      PHONE: [phone]                                                         FAX: [phone]  [email or web address]  ---------------------------------------------------------------------------------------------------------------------------------    Overnight events/patient complaints:  Pressors weaned off.  Patient remains hemodynamically stable off pressors.  No chest pain or dyspnea overnight.       PAST MEDICAL & SURGICAL HISTORY:  Stroke  Osteoarthritis  Hyperlipidemia  Depression  HTN (hypertension)  S/P stroke due to cerebrovascular disease      No Known Allergies    MEDICATIONS  (STANDING):  aspirin  chewable 81 milliGRAM(s) Oral daily  atorvastatin 20 milliGRAM(s) Oral at bedtime  buPROPion XL . 150 milliGRAM(s) Oral daily  chlorhexidine 0.12% Liquid 15 milliLiter(s) Oral Mucosa every 12 hours  chlorhexidine 4% Liquid 1 Application(s) Topical <User Schedule>  clopidogrel Tablet 75 milliGRAM(s) Oral daily  melatonin 5 milliGRAM(s) Oral at bedtime  metoprolol tartrate 12.5 milliGRAM(s) Oral two times a day  midodrine 15 milliGRAM(s) Oral every 8 hours  norepinephrine Infusion 0.05 MICROgram(s)/kG/Min (9.28 mL/Hr) IV Continuous <Continuous>  nystatin Cream 1 Application(s) Topical two times a day  piperacillin/tazobactam IVPB.. 3.375 Gram(s) IV Intermittent every 8 hours  senna 2 Tablet(s) Oral at bedtime    MEDICATIONS  (PRN):  acetaminophen   Tablet .. 650 milliGRAM(s) Oral every 6 hours PRN Temp greater or equal to 38C (100.4F), Moderate Pain (4 - 6)  traMADol 50 milliGRAM(s) Oral three times a day PRN Moderate Pain (4 - 6)      Vital Signs Last 24 Hrs  T(C): 37.3 (2020 08:03), Max: 37.8 (2020 11:43)  T(F): 99.1 (2020 08:03), Max: 100 (2020 11:43)  HR: 81 (2020 08:30) (60 - 92)  BP: 88/61 (2020 08:30) (77/47 - 132/80)  BP(mean): 70 (2020 08:30) (57 - 100)  RR: 27 (2020 08:30) (15 - 30)  SpO2: 96% (2020 08:30) (86% - 100%)  ICU Vital Signs Last 24 Hrs  SASKIA ELIAS  I&O's Detail    2020 07:01  -  2020 07:00  --------------------------------------------------------  IN:    norepinephrine Infusion: 72.8 mL    Oral Fluid: 510 mL    Solution: 50 mL    Solution: 300 mL  Total IN: 932.8 mL    OUT:    Indwelling Catheter - Urethral: 975 mL  Total OUT: 975 mL    Total NET: -42.2 mL        I&O's Summary    2020 07:01  -  2020 07:00  --------------------------------------------------------  IN: 932.8 mL / OUT: 975 mL / NET: -42.2 mL      Drug Dosing Weight  SASKIA ELIAS      PHYSICAL EXAM:  General: Appears well developed, well nourished alert and cooperative.  HEENT: Head; normocephalic, atraumatic.  Eyes: Pupils reactive, cornea wnl.  Neck: Supple, no nodes adenopathy  CARDIOVASCULAR: Normal S1 and S2, No murmur, rub, gallop or lift.   LUNGS: No rales, rhonchi or wheeze. Normal breath sounds bilaterally.  ABDOMEN: Soft, nontender without mass or organomegaly. bowel sounds normoactive.  EXTREMITIES: No clubbing, cyanosis or edema. Distal pulses wnl.   SKIN: warm and dry with normal turgor.  NEURO: Alert/oriented x 3  PSYCH: normal affect.        LABS:                        9.6    10.28 )-----------( 76       ( 2020 04:52 )             29.5     07-    138  |  104  |  25.0<H>  ----------------------------<  134<H>  3.9   |  26.0  |  0.84    Ca    8.2<L>      2020 04:52  Phos  3.0     07  Mg     1.7           SASKIA ELIAS        Urinalysis Basic - ( 2020 10:47 )    Color: Yellow / Appearance: Clear / S.025 / pH: x  Gluc: x / Ketone: Trace  / Bili: Negative / Urobili: Negative mg/dL   Blood: x / Protein: 30 mg/dL / Nitrite: Negative   Leuk Esterase: Small / RBC: 11-25 /HPF / WBC 3-5   Sq Epi: x / Non Sq Epi: Negative / Bacteria: Negative        RADIOLOGY & ADDITIONAL STUDIES:    INTERPRETATION OF TELEMETRY (personally reviewed): no episodes of VT, atrial flutter with BiV pacing        ASSESSMENT AND PLAN:  In summary, SASKIA ELIAS is an 79y Male with past medical history significant for HTN, HLD, CVA, CAD sp BENSON to LAD aw recurrent VT sp ablation and course complicated by asystolic arrest/TVP and subsequent BIV-ICD.     CAD- continue asa, plavix and statin    HFrEF- patient euvolemic on exam.  BP borderline right now.  If it improves, would start guideline directed HF meds including  ARNI/ACE/ARB and aldactone.  Will continue to follow.    -would switch tartrate to metoprolol succinate 25mg daily for HF indication    Atrial flutter- back in flutter.  Patient was cardioverted but converted back to the flutter yesterday.  I agree with starting eliquis.  Once eliquis is started, I would discontinue aspirin and continue with eliquis/plavix for blooding thinning/antiplatelet regimen.            Thank you for allowing [facility] to participate in the care of this patient.  Please feel free to call with any questions or concerns.

## 2020-07-23 NOTE — PROGRESS NOTE ADULT - ASSESSMENT
Sustained VT s/p ablation   Cardiac arrest, ROSC 3min   Acute hypoxic resp failure  CAD, s/p PCI (07/17)  ICM w/ EF < 25%    Patient seen and examined     Neuro: Neuro status likely back to normal. CTH grossly normal and EEG with no seizure like activity  Cardiovascular: S/p LHC w/ PCI to LAC on 07/17. S/p VT ablation, 07/20 and CRT-D, 07/21. Stopped antiarrhythmics after ablation. On ASA, statin, low dose beta-blockers (with parameters). Midodrine 10mg q8  Resp/Chest: Maintain SpO2 > 92%  GI/Nutrition: On pureed/ nectar thick  ID: Started on empiric Abx for aspiration PNA  Renal: Avoid nephrotoxic agents. Strict I&O. DC zayas   Endocrinology: Maintain Blood sugar < 180. ISS as per protocol  Haem/Oncology:  DVT ppx w/ SCDs. No heparinoid products as per Cards. Plan to start NOAC possibly tomorrow AM    Will d/c RIJ cordis and zayas today    Critical Care time: 35 minutes assessing presenting problems of acute illness that poses high probability of life threatening deterioration or end organ damage/dysfunction.  Medical decision making including Initiating plan of care, reviewing data, reviewing radiology, discussing with multidisciplinary team, non inclusive of procedures

## 2020-07-24 NOTE — CONSULT NOTE ADULT - ASSESSMENT
Patient is a 79 year old male with a pmhx of HTN, HLD, CAD, OA, CVA (10 years ago) now wheel chair bound from OA/CVA, admitted June 2020 for SVT that progressed to VT was successfully cardioverted, new onset ischemic cardiomyopathy s/p LHC and stent placement in 6/2020 presents to the ED c/o weakness and episodes of unresponsiveness, found to have:    1. VT  2. Cardiac arrest  3. Hypovolemia shock  4. AMS  5. Thrombocytopenia       Plan  Neuro: mental status improving with administration of fluid, pain control prn, delirium episode has resolved  Cardio: s/p stent to mLAD this admission, s/pVT ablation as well as PPM. TTE with known EF ~20%. Cardiac arrest several days ago with return of mental status. Pt with acute drop in BP this morning SBP 60s and acute change in mental status. I/Os noted to be negative 2.3L yesterday (he was given lasix). Will bolus with 1L of fluid. Bedside POCUS trace pericardial effusion, LV systolic fnx appears reduced, no b lines, IVC collapsed. Pt appears volume down will place on standing IV fluids. Stat TTE to r/u pericardial effusion. Hold BB, diuretics given dehydration. midodrine 15mg q8  Pulm: No active issues  Renal: Strict I/Os, renally dose meds, replace lytes prn, flomax   GI: NPO for now. Stat cbc to monitor for bleeding. CT abd to r/o RP bleed when able   Endo: no issues   Heme: cont aspirin, plavix, no chemical AC for now. will defer to EP. serial cbc. send HIT antibody given thrombocytopenia   ID: on zosyn, SA in sputum, consult ID, send blood cultures       Dispo: upgrade back to ICU, case d/w Dr. Ackerman

## 2020-07-24 NOTE — CONSULT NOTE ADULT - ASSESSMENT
79 year old male w/PMHx significant for CVA, ischemic cardiomyopathy HFrEF <20% with monomorphic VT, s/p PCI 6/2020 who presented to [facility] with syncope and recurrent VT.  He underwent another PCI to mLAD this admission, but continued to have episodes of prolonged VT.  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  s/p AICD on 7/21/20.  Hematology requested for thrombocytopenia.  Trend of platelets   7/21 - 177  7/22 - 117  7/23 - 76, 87  7/24 - 88, 97, 73    Exposure to heparin starting 7/16 and previous admission in 6/2020    HIT score 4-5 (intermediate)    Plan:  HIT is possible given recent exposure to heparin x 2 in last 1 month  Agree with HIT ab testing, if positive, should get a serotonin release assay for confirmation  Monitor closely for thrombosis, and if develops then argatroban gtt  HIT ab are transient, and even if positive, does not absolutely rule out future use of heparin (but not this admission), will need to be followed 79 year old male w/PMHx significant for CVA, ischemic cardiomyopathy HFrEF <20% with monomorphic VT, s/p PCI 6/2020 who presented to [facility] with syncope and recurrent VT.  He underwent another PCI to mLAD this admission, but continued to have episodes of prolonged VT.  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  s/p AICD on 7/21/20.  Hematology requested for thrombocytopenia.  Trend of platelets   7/21 - 177  7/22 - 117  7/23 - 76, 87  7/24 - 88, 97, 73    Exposure to heparin starting 7/16 and previous admission in 6/2020    HIT score 4-5 (intermediate)    Plan:  HIT is possible given recent exposure to heparin x 2 in last 1 month  Agree with HIT ab testing, if positive, should get a serotonin release assay for confirmation  Monitor closely for thrombosis, and if develops then argatroban gtt  HIT ab are transient, and even if positive, does not absolutely rule out future use of heparin (but not this admission), will need to be followed  daily CBC

## 2020-07-24 NOTE — PROGRESS NOTE ADULT - SUBJECTIVE AND OBJECTIVE BOX
[facility]                                   [address] [place] [address]                                                      PHONE: [phone]                                                         FAX: [phone]  [email or web address]  ---------------------------------------------------------------------------------------------------------------------------------    Overnight events/patient complaints:  NAD feeling better after IVF     No Known Allergies    MEDICATIONS  (STANDING):  aspirin  chewable 81 milliGRAM(s) Oral daily  atorvastatin 20 milliGRAM(s) Oral at bedtime  buPROPion XL . 150 milliGRAM(s) Oral daily  chlorhexidine 4% Liquid 1 Application(s) Topical <User Schedule>  clopidogrel Tablet 75 milliGRAM(s) Oral daily  furosemide    Tablet 40 milliGRAM(s) Oral daily  magnesium sulfate  IVPB 2 Gram(s) IV Intermittent once  melatonin 5 milliGRAM(s) Oral at bedtime  midodrine 15 milliGRAM(s) Oral every 8 hours  multiple electrolytes Injection Type 1 Bolus 500 milliLiter(s) IV Bolus once  nystatin Cream 1 Application(s) Topical two times a day  piperacillin/tazobactam IVPB.. 3.375 Gram(s) IV Intermittent every 8 hours  saccharomyces boulardii 250 milliGRAM(s) Oral two times a day  senna 2 Tablet(s) Oral at bedtime  tamsulosin 0.4 milliGRAM(s) Oral at bedtime    MEDICATIONS  (PRN):  acetaminophen   Tablet .. 650 milliGRAM(s) Oral every 6 hours PRN Temp greater or equal to 38C (100.4F), Moderate Pain (4 - 6)  traMADol 50 milliGRAM(s) Oral three times a day PRN Moderate Pain (4 - 6)      Vital Signs Last 24 Hrs  T(C): 36.6 (2020 08:00), Max: 37.6 (2020 12:01)  T(F): 97.9 (2020 08:00), Max: 99.7 (2020 12:01)  HR: 65 (2020 09:05) (59 - 93)  BP: 84/51 (2020 09:05) (56/41 - 147/78)  BP(mean): 63 (2020 09:05) (46 - 160)  RR: 13 (2020 09:05) (12 - 26)  SpO2: 96% (2020 09:05) (92% - 100%)  ICU Vital Signs Last 24 Hrs  SASKIA ELIAS  I&O's Detail    2020 07:01  -  2020 07:00  --------------------------------------------------------  IN:    Oral Fluid: 150 mL    Solution: 275 mL  Total IN: 425 mL    OUT:    Indwelling Catheter - Urethral: 2525 mL  Total OUT: 2525 mL    Total NET: -2100 mL        I&O's Summary    2020 07:01  -  2020 07:00  --------------------------------------------------------  IN: 425 mL / OUT: 2525 mL / NET: -2100 mL      Drug Dosing Weight  SASKIA ELIAS      PHYSICAL EXAM:  General: Appears well developed, well nourished alert and cooperative.  HEENT: Head; normocephalic, atraumatic.  Eyes: Pupils reactive, cornea wnl.  Neck: Supple, no nodes adenopathy, no NVD or carotid bruit or thyromegaly.  CARDIOVASCULAR: Normal S1 and S2, 1/6 murmur, rub, gallop or lift.   LUNGS: No rales, rhonchi or wheeze. Normal breath sounds bilaterally.  ABDOMEN: Soft, nontender without mass or organomegaly. bowel sounds normoactive.  EXTREMITIES: No clubbing, cyanosis or edema. Distal pulses wnl.   SKIN: warm and dry with normal turgor.  NEURO: Alert/oriented x 3/normal motor exam. No pathologic reflexes.    PSYCH: normal affect.        LABS:                        8.8    9.63  )-----------( 88       ( 2020 06:30 )             27.3     07-24    139  |  99  |  22.0<H>  ----------------------------<  90  3.8   |  27.0  |  0.86    Ca    8.6      2020 06:30  Phos  3.5     07-24  Mg     1.5     07-24    TPro  6.0<L>  /  Alb  3.1<L>  /  TBili  0.6  /  DBili  x   /  AST  15  /  ALT  8   /  AlkPhos  64  07-24    SASKIA ELIAS        Urinalysis Basic - ( 2020 10:47 )    Color: Yellow / Appearance: Clear / S.025 / pH: x  Gluc: x / Ketone: Trace  / Bili: Negative / Urobili: Negative mg/dL   Blood: x / Protein: 30 mg/dL / Nitrite: Negative   Leuk Esterase: Small / RBC: 11-25 /HPF / WBC 3-5   Sq Epi: x / Non Sq Epi: Negative / Bacteria: Negative        RADIOLOGY & ADDITIONAL STUDIES:    INTERPRETATION OF TELEMETRY (personally reviewed):      Summary:   1. Endocardial visualization was enhanced withintravenous echo contrast.   2. Mildly enlarged left atrium.   3. Left ventricular ejection fraction, by visual estimation, is <20%.   4. The right atrium is normal in size.   5. The right ventricular size is mildly enlarged. Moderately reduced RV systolic function.   6. No significant valvular abnomality.   7. There is no evidence of pericardial effusion.   8. Dilatation of the aortic root and sinuses of Valsalva.   9. Comapred to study from 2020, there is no significant change in LV function.    MD Rona, RPVI Electronically signed on 2020 at 4:05:19 PM         CARDIAC CATHETERIZATION:  HEMODYNAMICS: Hemodynamic assessment demonstrates mildly elevated LVEDP.  CORONARY VESSELS: The coronary circulation is left dominant.  LM:   --  Mid left main: There was a 30 % stenosis.  --  Distal left main: Angiography showed minor luminal irregularities with  no flow limiting lesions.  LAD:   --  Proximal LAD: There was a 0 % stenosis at the site of a prior  stent.  --  Mid LAD: There was a 70 % stenosis.  CX:   --  Proximal circumflex: Angiography showed minor luminal  irregularities with no flow limiting lesions.  --  Mid circumflex: Angiography showed minor luminal irregularities with no  flow limiting lesions.  --  Distal circumflex: Angiography showed minor luminal irregularities with  no flow limiting lesions.  --  L AV groove: Angiography showed minor luminal irregularities with no  flow limiting lesions.  RCA:   --  Proximal RCA: Angiography showed minor luminal irregularities  with no flow limiting lesions.  --  Mid RCA: There was a 40 % stenosis.  COMPLICATIONS: There were no complications. No complications occurred  during the cath lab visit.  SUMMARY:  HEMODYNAMICS: Hemodynamic assessment demonstrates mildly elevated LVEDP.  DIAGNOSTIC IMPRESSIONS: Patent ostial LAD stent.  IVUS and iFR of LAD shoed 70% mid LAD stenosis with positive iFR s/p IVUS  assisted PTCA and 3.0 x 34 BENSON postdilated proximally to 4.5mm.  DIAGNOSTIC RECOMMENDATIONS: Medical therapy for CAD  ICD evaluation for sustained VT.  INTERVENTIONAL IMPRESSIONS: Patent ostialLAD stent.  IVUS and iFR of LAD shoed 70% mid LAD stenosis with positive iFR s/p IVUS  assisted PTCA and 3.0 x 34 BENSON postdilated proximally to 4.5mm.  INTERVENTIONAL RECOMMENDATIONS: Medical therapy for CAD  ICD evaluation for sustained VT.  Prepared and signed by  Roger Robert MD      ASSESSMENT AND PLAN:  In summary, SASKIA ELIAS is an 79y Male with past medical history significant for HTN, HLD, CVA, CAD/MI s/p BENSON to LAD presents with recurrent VT s/p ablation and course complicated by asystolic arrest/TVP and subsequent BIV-ICD; Hypotension improved with NS 500cc; Lasix 40 mg yesterday 2.3 out patient; limited ECHO TDS no clear evidence of effusion pericardial; Prox Aflutter not on long term AC due to bleeding risk.    Plan  1.  Continue DAPT and monitor H/H  2.  Limited TTE today to rule out pericardial effusion   3.  Hold B-Blockers, Lasix, ACE-I or ARB at this time   4.  Monitor statin therapy   5.  MICU care

## 2020-07-24 NOTE — CONSULT NOTE ADULT - ATTENDING COMMENTS
79M w/ PMhx HTN, HLD, CAD s/p recent PCI, ICM, CVA was admitted w/ sustained WCT which was initially chemically cardioverted w/ Lidocaine gtt.  S/p LHC w/ PCI to LAD on 07/17 after which he underwent VT ablation, 07/20 and later a CRT-D insertion on 07/21. He also had an EEG while in the ICU w/ no seizure like activity.  Upgraded back to ICU this morning after he was found to be hypotensive. He was -2.5L yesterday after diuresis. His BP responded to 1L hydration and he was placed on gentle hydration.   Heam/ ID eval as per Cards. CT A/P pending. PF4 requested

## 2020-07-24 NOTE — CONSULT NOTE ADULT - SUBJECTIVE AND OBJECTIVE BOX
REASON FOR CONSULTATION:     HPI:  78 yo M PMH HTN, HLD, CAD, OA, CVA (10 years ago) now wheel chair bound from OA/CVA, admitted June 2020 for SVT that progressed to VT was successfully cardioverted, new onset ischemic cardiomyopathy s/p LHC and stent placement in 6/2020 presented to the ED on 7/16/20 c/o weakness and episodes of unresponsiveness   He reports feeling unwell for the past several days, then at approx noon today he began to feel very weak and reports having several episodes of passing out.  He wears a LifeVest and states the alarm went off several times today, he kept pressing a button to turn it off and then the vest would alarm again several minutes after.  He then decided to call 911, and took the LifeVest off at home     Pt admitted to the ICU for VT/SVT, ischemic cardiomyopathy HFrEF <20% from previous admission,  CARLA pre-renal vs. ATN (16 Jul 2020 23:19)      REVIEW OF SYSTEMS:  Constitutional, Eyes, ENT, Cardiovascular, Respiratory, Gastrointestinal, Genitourinary, Musculoskeletal, Integumentary, Neurological, Psychiatric, Endocrine, Heme/Lymph, and Allergic/Immunologic review of systems are otherwise negative except as noted in the HPI.    PAST MEDICAL & SURGICAL HISTORY:  Stroke  Osteoarthritis  Hyperlipidemia  Depression  HTN (hypertension)  S/P stroke due to cerebrovascular disease      FAMILY HISTORY:  Family history unknown      SOCIAL HISTORY:    Allergies    No Known Allergies    Intolerances        MEDICATIONS  (STANDING):  aspirin  chewable 81 milliGRAM(s) Oral daily  atorvastatin 20 milliGRAM(s) Oral at bedtime  buPROPion XL . 150 milliGRAM(s) Oral daily  chlorhexidine 2% Cloths 1 Application(s) Topical <User Schedule>  chlorhexidine 4% Liquid 1 Application(s) Topical <User Schedule>  clopidogrel Tablet 75 milliGRAM(s) Oral daily  lactated ringers. 1000 milliLiter(s) (60 mL/Hr) IV Continuous <Continuous>  melatonin 5 milliGRAM(s) Oral at bedtime  midodrine 15 milliGRAM(s) Oral every 8 hours  nystatin Cream 1 Application(s) Topical two times a day  piperacillin/tazobactam IVPB.. 3.375 Gram(s) IV Intermittent every 8 hours  saccharomyces boulardii 250 milliGRAM(s) Oral two times a day  senna 2 Tablet(s) Oral at bedtime  tamsulosin 0.4 milliGRAM(s) Oral at bedtime    MEDICATIONS  (PRN):  acetaminophen   Tablet .. 650 milliGRAM(s) Oral every 6 hours PRN Temp greater or equal to 38C (100.4F), Moderate Pain (4 - 6)  traMADol 50 milliGRAM(s) Oral three times a day PRN Moderate Pain (4 - 6)      Vital Signs Last 24 Hrs  T(C): 36.6 (24 Jul 2020 08:00), Max: 37.6 (23 Jul 2020 12:01)  T(F): 97.9 (24 Jul 2020 08:00), Max: 99.7 (23 Jul 2020 12:01)  HR: 89 (24 Jul 2020 11:00) (59 - 93)  BP: 90/55 (24 Jul 2020 11:00) (56/41 - 147/78)  BP(mean): 66 (24 Jul 2020 11:00) (46 - 160)  RR: 15 (24 Jul 2020 11:00) (12 - 26)  SpO2: 98% (24 Jul 2020 11:00) (90% - 100%)    PHYSICAL EXAM:    GENERAL: NAD, well-groomed, well-developed  HEAD:  Atraumatic, Normocephalic  EYES: EOMI, PERRLA, conjunctiva and sclera clear  ENMT: No tonsillar erythema, exudates, or enlargement; Moist mucous membranes, Good dentition, No lesions  NECK: Supple, No JVD, Normal thyroid  NERVOUS SYSTEM:  Alert & Oriented X3, Good concentration; Motor Strength 5/5 B/L upper and lower extremities; DTRs 2+ intact and symmetric  CHEST/LUNG: Clear to auscultation bilaterally; No rales, rhonchi, wheezing, or rubs  HEART: Regular rate and rhythm; No murmurs, rubs, or gallops  ABDOMEN: Soft, Nontender, Nondistended; Bowel sounds present  EXTREMITIES:  2+ Peripheral Pulses, No clubbing, cyanosis, or edema  LYMPH: No lymphadenopathy noted  SKIN: No rashes or lesions      LABS:                        9.2    9.22  )-----------( 73       ( 24 Jul 2020 10:09 )             28.0     07-24    135  |  95<L>  |  22.0<H>  ----------------------------<  128<H>  3.5   |  27.0  |  0.96    Ca    8.6      24 Jul 2020 09:17  Phos  3.5     07-24  Mg     1.5     07-24    TPro  5.8<L>  /  Alb  3.2<L>  /  TBili  0.7  /  DBili  x   /  AST  15  /  ALT  9   /  AlkPhos  64  07-24            RADIOLOGY & ADDITIONAL STUDIES:    PATHOLOGY: REASON FOR CONSULTATION:     HPI:  78 yo M PMH HTN, HLD, CAD, OA, CVA (10 years ago) now wheel chair bound from OA/CVA, admitted June 2020 for SVT that progressed to VT was successfully cardioverted, new onset ischemic cardiomyopathy s/p LHC and stent placement in 6/2020 presented to the ED on 7/16/20 c/o weakness and episodes of unresponsiveness   He reports feeling unwell for the past several days, then at approx noon today he began to feel very weak and reports having several episodes of passing out.  He wears a LifeVest and states the alarm went off several times today, he kept pressing a button to turn it off and then the vest would alarm again several minutes after.  He then decided to call 911, and took the LifeVest off at home     Pt admitted to the ICU for VT/SVT, ischemic cardiomyopathy HFrEF <20% from previous admission,  CARLA pre-renal vs. ATN (16 Jul 2020 23:19)      REVIEW OF SYSTEMS:  Constitutional, Eyes, ENT, Cardiovascular, Respiratory, Gastrointestinal, Genitourinary, Musculoskeletal, Integumentary, Neurological, Psychiatric, Endocrine, Heme/Lymph, and Allergic/Immunologic review of systems are otherwise negative except as noted in the HPI.    PAST MEDICAL & SURGICAL HISTORY:  Stroke  Osteoarthritis  Hyperlipidemia  Depression  HTN (hypertension)  S/P stroke due to cerebrovascular disease      FAMILY HISTORY:  Family history unknown      SOCIAL HISTORY:    Allergies    No Known Allergies    Intolerances        MEDICATIONS  (STANDING):  aspirin  chewable 81 milliGRAM(s) Oral daily  atorvastatin 20 milliGRAM(s) Oral at bedtime  buPROPion XL . 150 milliGRAM(s) Oral daily  chlorhexidine 2% Cloths 1 Application(s) Topical <User Schedule>  chlorhexidine 4% Liquid 1 Application(s) Topical <User Schedule>  clopidogrel Tablet 75 milliGRAM(s) Oral daily  lactated ringers. 1000 milliLiter(s) (60 mL/Hr) IV Continuous <Continuous>  melatonin 5 milliGRAM(s) Oral at bedtime  midodrine 15 milliGRAM(s) Oral every 8 hours  nystatin Cream 1 Application(s) Topical two times a day  piperacillin/tazobactam IVPB.. 3.375 Gram(s) IV Intermittent every 8 hours  saccharomyces boulardii 250 milliGRAM(s) Oral two times a day  senna 2 Tablet(s) Oral at bedtime  tamsulosin 0.4 milliGRAM(s) Oral at bedtime    MEDICATIONS  (PRN):  acetaminophen   Tablet .. 650 milliGRAM(s) Oral every 6 hours PRN Temp greater or equal to 38C (100.4F), Moderate Pain (4 - 6)  traMADol 50 milliGRAM(s) Oral three times a day PRN Moderate Pain (4 - 6)      Vital Signs Last 24 Hrs  T(C): 36.6 (24 Jul 2020 08:00), Max: 37.6 (23 Jul 2020 12:01)  T(F): 97.9 (24 Jul 2020 08:00), Max: 99.7 (23 Jul 2020 12:01)  HR: 89 (24 Jul 2020 11:00) (59 - 93)  BP: 90/55 (24 Jul 2020 11:00) (56/41 - 147/78)  BP(mean): 66 (24 Jul 2020 11:00) (46 - 160)  RR: 15 (24 Jul 2020 11:00) (12 - 26)  SpO2: 98% (24 Jul 2020 11:00) (90% - 100%)    PHYSICAL EXAM:    GENERAL: lethargic  HEAD:  Atraumatic, Normocephalic  ENMT:  Moist mucous membranes,   NECK: Supple,   NERVOUS SYSTEM:  Alert & Oriented X3, Good concentration;   CHEST/LUNG: Clear to auscultation bilaterally;   HEART: Regular rate and rhythm;  ABDOMEN: Soft, Nontender,   EXTREMITIES: no edema  LYMPH: No lymphadenopathy noted  SKIN: No rashes or lesions      LABS:                        9.2    9.22  )-----------( 73       ( 24 Jul 2020 10:09 )             28.0     07-24    135  |  95<L>  |  22.0<H>  ----------------------------<  128<H>  3.5   |  27.0  |  0.96    Ca    8.6      24 Jul 2020 09:17  Phos  3.5     07-24  Mg     1.5     07-24    TPro  5.8<L>  /  Alb  3.2<L>  /  TBili  0.7  /  DBili  x   /  AST  15  /  ALT  9   /  AlkPhos  64  07-24

## 2020-07-24 NOTE — CONSULT NOTE ADULT - SUBJECTIVE AND OBJECTIVE BOX
INFECTIOUS DISEASES AND INTERNAL MEDICINE at [place]  =======================================================  Andrey Johnson MD  Diplomates [employer] Board of Internal Medicine and Infectious Diseases  Telephone [phone]  Fax            [phone]  =======================================================    SASKIA [id number]      HPI:  78 yo M PMH HTN, HLD, CAD, OA, CVA (10 years ago) now wheel chair bound from OA/CVA, admitted June 2020 for SVT that progressed to VT was successfully cardioverted, new onset ischemic cardiomyopathy s/p LHC and stent placement in 6/2020 presents to the ED c/o weakness and episodes of unresponsiveness beginning today.  He reports feeling unwell for the past several days, then at approx noon today he began to feel very weak and reports having several episodes of passing out.  He wears a LifeVest and states the alarm went off several times today, he kept pressing a button to turn it off and then the vest would alarm again several minutes after.  He then decided to call 911, and took the LifeVest off at home  In ED pt went unresponsive in VT was given total 100 of lido and started on amio gtt, BP dropped ton SBP 60's then after maintaining NSR SBPin 80's, pt given gentle bolus of 500cc. Pt had multiple episodes of VT in ED while on amio gtt, pt converted spontaneously twice then was given another 50 of lidocaine, and started on Liodcaine gtt. Pt BP remained normotensive throughout and, pt was asymptomatic.   Multiple calls made out to cards/EPservice pending eval, pads and zol at bedside will likely need device placed in AM. Pt admitted to the ICU for VT/SVT, ischemic cardiomyopathy HFrEF <20% from previous admission,  CARLA pre-renal vs. ATN   PT WITH  EPISODE OF  ASYSTOLE   CPR DONE  INTUBATION THEN HAD V/P ABLATION AND IMPLANTATION ICD  PT HAS BEEN ON IV ABX FOR POSSIBLE ASPIRATION  PNEUMONIA  PT WITH EPISODE OF HYPOTENSION THIS  AM    ASKED TO EVALUATE FROM ID STANDPOINT         PAST MEDICAL & SURGICAL HISTORY:  Stroke  Osteoarthritis  Hyperlipidemia  Depression  HTN (hypertension)  S/P stroke due to cerebrovascular disease      ANTIBIOTICS  piperacillin/tazobactam IVPB.. 3.375 Gram(s) IV Intermittent every 8 hours      Allergies    No Known Allergies    Intolerances        SOCIAL HISTORY:     FAMILY HX   FAMILY HISTORY:  Family history unknown      Vital Signs Last 24 Hrs  T(C): 36.6 (24 Jul 2020 08:00), Max: 37.6 (23 Jul 2020 12:01)  T(F): 97.9 (24 Jul 2020 08:00), Max: 99.7 (23 Jul 2020 12:01)  HR: 89 (24 Jul 2020 11:00) (59 - 93)  BP: 90/55 (24 Jul 2020 11:00) (56/41 - 147/78)  BP(mean): 66 (24 Jul 2020 11:00) (46 - 160)  RR: 15 (24 Jul 2020 11:00) (12 - 26)  SpO2: 98% (24 Jul 2020 11:00) (90% - 100%)  Drug Dosing Weight  Height (cm): 182.9 (17 Jul 2020 01:09)  Weight (kg): 99 (17 Jul 2020 01:09)  BMI (kg/m2): 29.6 (17 Jul 2020 01:09)  BSA (m2): 2.21 (17 Jul 2020 01:09)      REVIEW OF SYSTEMS:    CONSTITUTIONAL:  As per HPI.    HEENT:  Eyes:  No diplopia or blurred vision. ENT:  No earache, sore throat or runny nose.    CARDIOVASCULAR:  No pressure, squeezing, strangling, tightness, heaviness or aching about the chest, neck, axilla or epigastrium.    RESPIRATORY:  No cough, shortness of breath, PND or orthopnea.    GASTROINTESTINAL:  No nausea, vomiting or diarrhea.    GENITOURINARY:  No dysuria, frequency or urgency.    MUSCULOSKELETAL:  As per HPI.    SKIN:  No change in skin, hair or nails.    NEUROLOGIC:  No paresthesias, fasciculations, seizures or weakness.                  PHYSICAL EXAMINATION:    GENERAL: The patient is a well-developed, well-nourished __ IN NAD    VITAL SIGNS: T(C): 36.6 (07-24-20 @ 08:00), Max: 37.6 (07-23-20 @ 12:01)  HR: 89 (07-24-20 @ 11:00) (59 - 93)  BP: 90/55 (07-24-20 @ 11:00) (56/41 - 147/78)  RR: 15 (07-24-20 @ 11:00) (12 - 26)  SpO2: 98% (07-24-20 @ 11:00) (90% - 100%)  Wt(kg): --    HEENT: Head is normocephalic and atraumatic.  ANICTERIC  NECK: Supple. No carotid bruits.  No lymphadenopathy or thyromegaly.    LUNGS:COARSE BREATH SOUNDS    HEART: Regular rate and rhythm without murmur.    ABDOMEN: Soft, nontender, and nondistended.  Positive bowel sounds.  No hepatosplenomegaly was noted. NO REBOUND NO GUARDING    EXTREMITIES: NO EDEMA NO ERYTHEMA    NEUROLOGIC: NON FOCAL      SKIN: No ulceration or induration present. NO RASH        BLOOD CULTURES  Culture Results:   Moderate Staphylococcus aureus  Normal Respiratory Rosana absent (07-22 @ 06:41)       URINE CX          LABS:                        9.2    9.22  )-----------( 73       ( 24 Jul 2020 10:09 )             28.0     07-24    135  |  95<L>  |  22.0<H>  ----------------------------<  128<H>  3.5   |  27.0  |  0.96    Ca    8.6      24 Jul 2020 09:17  Phos  3.5     07-24  Mg     1.5     07-24    TPro  5.8<L>  /  Alb  3.2<L>  /  TBili  0.7  /  DBili  x   /  AST  15  /  ALT  9   /  AlkPhos  64  07-24          RADIOLOGY & ADDITIONAL STUDIES:      ASSESSMENT/PLAN  78 yo M PMH HTN, HLD, CAD, OA, CVA (10 years ago) now wheel chair bound from OA/CVA, admitted June 2020 for SVT that progressed to VT was successfully cardioverted, new onset ischemic cardiomyopathy s/p LHC and stent placement in 6/2020 presents to the ED c/o weakness and episodes of unresponsiveness beginning today.  He reports feeling unwell for the past several days, then at approx noon today he began to feel very weak and reports having several episodes of passing out.  He wears a LifeVest and states the alarm went off several times today, he kept pressing a button to turn it off and then the vest would alarm again several minutes after.  He then decided to call 911, and took the LifeVest off at home  In ED pt went unresponsive in VT was given total 100 of lido and started on amio gtt, BP dropped ton SBP 60's then after maintaining NSR SBPin 80's, pt given gentle bolus of 500cc. Pt had multiple episodes of VT in ED while on amio gtt, pt converted spontaneously twice then was given another 50 of lidocaine, and started on Liodcaine gtt. Pt BP remained normotensive throughout and, pt was asymptomatic.   Multiple calls made out to cards/EPservice pending eval, pads and zol at bedside will likely need device placed in AM. Pt admitted to the ICU for VT/SVT, ischemic cardiomyopathy HFrEF <20% from previous admission,  CARLA pre-renal vs. ATN   PT WITH  EPISODE OF  ASYSTOLE   CPR DONE  INTUBATION THEN HAD V/P ABLATION AND IMPLANTATION ICD  PT HAS BEEN ON IV ABX FOR POSSIBLE ASPIRATION  PNEUMONIA   HAD EPISODE OF HYPOTENSION THIS AM  PT CURRENTLY AWAKE AND ALERT  AGREE WITH BLOOD CX X2 SETS ALTHOUGH LOWER YIELD AS PT ALREADY ON ABX   SPUTUM CX WITH STAPH  AUREUS MSSA  CXR NEG  WILL D/W ICU   CONTINUE ZOSYN FOR NOW  WILL FOLLOW UP WITH OTIS GUAJARDO MD

## 2020-07-24 NOTE — PROGRESS NOTE ADULT - SUBJECTIVE AND OBJECTIVE BOX
SASKIA ELIAS  ----------------------------------------  The patient was seen and evaluated for     Vital Signs Last 24 Hrs  T(C): 36.6 (24 Jul 2020 08:00), Max: 37.6 (23 Jul 2020 12:01)  T(F): 97.9 (24 Jul 2020 08:00), Max: 99.7 (23 Jul 2020 12:01)  HR: 89 (24 Jul 2020 11:00) (59 - 93)  BP: 90/55 (24 Jul 2020 11:00) (56/41 - 147/78)  BP(mean): 66 (24 Jul 2020 11:00) (46 - 160)  RR: 15 (24 Jul 2020 11:00) (12 - 26)  SpO2: 98% (24 Jul 2020 11:00) (90% - 100%)    CAPILLARY BLOOD GLUCOSE        PHYSICAL EXAMINATION:  ----------------------------------------      LABORATORY STUDIES:  ----------------------------------------                        9.2    9.22  )-----------( 73       ( 24 Jul 2020 10:09 )             28.0     07-24    135  |  95<L>  |  22.0<H>  ----------------------------<  128<H>  3.5   |  27.0  |  0.96    Ca    8.6      24 Jul 2020 09:17  Phos  3.5     07-24  Mg     1.5     07-24    TPro  5.8<L>  /  Alb  3.2<L>  /  TBili  0.7  /  DBili  x   /  AST  15  /  ALT  9   /  AlkPhos  64  07-24    LIVER FUNCTIONS - ( 24 Jul 2020 09:17 )  Alb: 3.2 g/dL / Pro: 5.8 g/dL / ALK PHOS: 64 U/L / ALT: 9 U/L / AST: 15 U/L / GGT: x                         Culture - Sputum (collected 22 Jul 2020 06:41)  Source: .Sputum Sputum  Gram Stain (22 Jul 2020 11:24):    Numerous polymorphonuclear leukocytes per low power field    No Squamous epithelial cells per low power field    Rare Gram Positive Cocci in Pairs and Chains  Final Report (24 Jul 2020 11:23):    Moderate Staphylococcus aureus    Normal Respiratory Rosana absent  Organism: Staphylococcus aureus (24 Jul 2020 11:23)  Organism: Staphylococcus aureus (24 Jul 2020 11:23)        MEDICATIONS  (STANDING):  aspirin  chewable 81 milliGRAM(s) Oral daily  atorvastatin 20 milliGRAM(s) Oral at bedtime  buPROPion XL . 150 milliGRAM(s) Oral daily  chlorhexidine 2% Cloths 1 Application(s) Topical <User Schedule>  chlorhexidine 4% Liquid 1 Application(s) Topical <User Schedule>  clopidogrel Tablet 75 milliGRAM(s) Oral daily  lactated ringers. 1000 milliLiter(s) (60 mL/Hr) IV Continuous <Continuous>  melatonin 5 milliGRAM(s) Oral at bedtime  midodrine 15 milliGRAM(s) Oral every 8 hours  nystatin Cream 1 Application(s) Topical two times a day  piperacillin/tazobactam IVPB.. 3.375 Gram(s) IV Intermittent every 8 hours  saccharomyces boulardii 250 milliGRAM(s) Oral two times a day  senna 2 Tablet(s) Oral at bedtime  tamsulosin 0.4 milliGRAM(s) Oral at bedtime    MEDICATIONS  (PRN):  acetaminophen   Tablet .. 650 milliGRAM(s) Oral every 6 hours PRN Temp greater or equal to 38C (100.4F), Moderate Pain (4 - 6)  traMADol 50 milliGRAM(s) Oral three times a day PRN Moderate Pain (4 - 6)      ASSESSMENT / PLAN:  ---------------------------------------- SASKIA JADA  ----------------------------------------  The patient was seen and evaluated for cardiac arrest. Denied any chest pain or dyspnea.    Vital Signs Last 24 Hrs  T(C): 36.6 (24 Jul 2020 08:00), Max: 37.6 (23 Jul 2020 12:01)  T(F): 97.9 (24 Jul 2020 08:00), Max: 99.7 (23 Jul 2020 12:01)  HR: 89 (24 Jul 2020 11:00) (59 - 93)  BP: 90/55 (24 Jul 2020 11:00) (56/41 - 147/78)  BP(mean): 66 (24 Jul 2020 11:00) (46 - 160)  RR: 15 (24 Jul 2020 11:00) (12 - 26)  SpO2: 98% (24 Jul 2020 11:00) (90% - 100%)    PHYSICAL EXAMINATION:  ----------------------------------------  General appearance: No acute distress, Awake, Alert  HEENT: Normocephalic, Atraumatic, Conjunctiva clear, EOMI  Neck: Supple, No JVD, No tenderness, Right dressing clean an intact  Lungs: Breath sound equal bilaterally, No wheezes, Occasional rhonchi  Cardiovascular: S1S2, Regular rhythm  Abdomen: Soft, Nontender, Nondistended, No guarding/rebound, Positive bowel sounds  Extremities: No clubbing, No cyanosis, No edema, No calf tenderness  Neuro: Strength equal bilaterally, No tremors  Psychiatric: Appropriate mood, Normal affect    LABORATORY STUDIES:  ----------------------------------------             9.2    9.22  )-----------( 73       ( 24 Jul 2020 10:09 )             28.0     07-24    135  |  95<L>  |  22.0<H>  ----------------------------<  128<H>  3.5   |  27.0  |  0.96    Ca    8.6      24 Jul 2020 09:17  Phos  3.5     07-24  Mg     1.5     07-24    TPro  5.8<L>  /  Alb  3.2<L>  /  TBili  0.7  /  DBili  x   /  AST  15  /  ALT  9   /  AlkPhos  64  07-24    LIVER FUNCTIONS - ( 24 Jul 2020 09:17 )  Alb: 3.2 g/dL / Pro: 5.8 g/dL / ALK PHOS: 64 U/L / ALT: 9 U/L / AST: 15 U/L / GGT: x           Culture - Sputum (collected 22 Jul 2020 06:41)  Source: .Sputum Sputum  Gram Stain (22 Jul 2020 11:24):    Numerous polymorphonuclear leukocytes per low power field    No Squamous epithelial cells per low power field    Rare Gram Positive Cocci in Pairs and Chains  Final Report (24 Jul 2020 11:23):    Moderate Staphylococcus aureus    Normal Respiratory Rosana absent  Organism: Staphylococcus aureus (24 Jul 2020 11:23)  Organism: Staphylococcus aureus (24 Jul 2020 11:23)    MEDICATIONS  (STANDING):  aspirin  chewable 81 milliGRAM(s) Oral daily  atorvastatin 20 milliGRAM(s) Oral at bedtime  buPROPion XL . 150 milliGRAM(s) Oral daily  chlorhexidine 2% Cloths 1 Application(s) Topical <User Schedule>  chlorhexidine 4% Liquid 1 Application(s) Topical <User Schedule>  clopidogrel Tablet 75 milliGRAM(s) Oral daily  lactated ringers. 1000 milliLiter(s) (60 mL/Hr) IV Continuous <Continuous>  melatonin 5 milliGRAM(s) Oral at bedtime  midodrine 15 milliGRAM(s) Oral every 8 hours  nystatin Cream 1 Application(s) Topical two times a day  piperacillin/tazobactam IVPB.. 3.375 Gram(s) IV Intermittent every 8 hours  saccharomyces boulardii 250 milliGRAM(s) Oral two times a day  senna 2 Tablet(s) Oral at bedtime  tamsulosin 0.4 milliGRAM(s) Oral at bedtime    MEDICATIONS  (PRN):  acetaminophen   Tablet .. 650 milliGRAM(s) Oral every 6 hours PRN Temp greater or equal to 38C (100.4F), Moderate Pain (4 - 6)  traMADol 50 milliGRAM(s) Oral three times a day PRN Moderate Pain (4 - 6)      ASSESSMENT / PLAN:  ----------------------------------------  79M with history of stroke, depression, hypertension, and coronary artery disease who was previously admitted for ventricular tachycardia found to have cardiomyopathy and required percutaneous coronary intervention to the left anterior descending artery and was discharged home with an external defibrillator vest. The patient presented with episodes of syncope and alarms from the defibrillator vest found to have ventricular tachycardia. The patient was placed on antiarrhythmic medications and underwent VT ablation. He subsequently had cardiac arrest and required intubation with initiation of vasopressor support and empiric antibiotics. An AICD was implanted and the patient subsequently extubated.    Ventricular tachycardia / Cardiac arrest - Status post VT ablation and AICD implantation. Cardiology and Electrophysiology consultation noted. Metoprolol and lisinopril were held due to hypotension. Levophed weaned off and on midodrine but had recurrent hypotension. Intravenous fluids administered, close hemodynamic monitoring.  Monitoring blood pressures.    Respiratory failure / Suspected aspiration - On piperacillin/tazobactam. Infectious Disease consultation noted.    Coronary artery disease - Status post percutaneous coronary intervention. On aspirin and clopidogrel.     Atrial fibrillation - Paced rhythm on telemetry. To initiate anticoagulation once thought to be feasible by Electrophysiology.    Acute respiratory failure - Status post extubation. No dyspnea or hypoxia on examination. On empiric piperacillin/tazobactam.    Thrombocytopenia - Monitoring platelet count. No obvious bleeding noted. Awaiting HIT serology. Hematology consultation noted.    Acute kidney injury - Renal function improved. Monitoring urine output from Knox catheter.    Depression - On bupropion. SASKIA JADA  ----------------------------------------  The patient was seen and evaluated for cardiac arrest. Denied any chest pain or dyspnea.    Vital Signs Last 24 Hrs  T(C): 36.6 (24 Jul 2020 08:00), Max: 37.6 (23 Jul 2020 12:01)  T(F): 97.9 (24 Jul 2020 08:00), Max: 99.7 (23 Jul 2020 12:01)  HR: 89 (24 Jul 2020 11:00) (59 - 93)  BP: 90/55 (24 Jul 2020 11:00) (56/41 - 147/78)  BP(mean): 66 (24 Jul 2020 11:00) (46 - 160)  RR: 15 (24 Jul 2020 11:00) (12 - 26)  SpO2: 98% (24 Jul 2020 11:00) (90% - 100%)    PHYSICAL EXAMINATION:  ----------------------------------------  General appearance: No acute distress, Awake, Alert  HEENT: Normocephalic, Atraumatic, Conjunctiva clear, EOMI  Neck: Supple, No JVD, No tenderness, Right dressing clean an intact  Lungs: Breath sound equal bilaterally, No wheezes, Occasional rhonchi  Cardiovascular: S1S2, Regular rhythm  Abdomen: Soft, Nontender, Nondistended, No guarding/rebound, Positive bowel sounds  Extremities: No clubbing, No cyanosis, No edema, No calf tenderness  Neuro: Strength equal bilaterally, No tremors  Psychiatric: Appropriate mood, Normal affect    LABORATORY STUDIES:  ----------------------------------------             9.2    9.22  )-----------( 73       ( 24 Jul 2020 10:09 )             28.0     07-24    135  |  95<L>  |  22.0<H>  ----------------------------<  128<H>  3.5   |  27.0  |  0.96    Ca    8.6      24 Jul 2020 09:17  Phos  3.5     07-24  Mg     1.5     07-24    TPro  5.8<L>  /  Alb  3.2<L>  /  TBili  0.7  /  DBili  x   /  AST  15  /  ALT  9   /  AlkPhos  64  07-24    LIVER FUNCTIONS - ( 24 Jul 2020 09:17 )  Alb: 3.2 g/dL / Pro: 5.8 g/dL / ALK PHOS: 64 U/L / ALT: 9 U/L / AST: 15 U/L / GGT: x           Culture - Sputum (collected 22 Jul 2020 06:41)  Source: .Sputum Sputum  Gram Stain (22 Jul 2020 11:24):    Numerous polymorphonuclear leukocytes per low power field    No Squamous epithelial cells per low power field    Rare Gram Positive Cocci in Pairs and Chains  Final Report (24 Jul 2020 11:23):    Moderate Staphylococcus aureus    Normal Respiratory Rosana absent  Organism: Staphylococcus aureus (24 Jul 2020 11:23)  Organism: Staphylococcus aureus (24 Jul 2020 11:23)    MEDICATIONS  (STANDING):  aspirin  chewable 81 milliGRAM(s) Oral daily  atorvastatin 20 milliGRAM(s) Oral at bedtime  buPROPion XL . 150 milliGRAM(s) Oral daily  chlorhexidine 2% Cloths 1 Application(s) Topical <User Schedule>  chlorhexidine 4% Liquid 1 Application(s) Topical <User Schedule>  clopidogrel Tablet 75 milliGRAM(s) Oral daily  lactated ringers. 1000 milliLiter(s) (60 mL/Hr) IV Continuous <Continuous>  melatonin 5 milliGRAM(s) Oral at bedtime  midodrine 15 milliGRAM(s) Oral every 8 hours  nystatin Cream 1 Application(s) Topical two times a day  piperacillin/tazobactam IVPB.. 3.375 Gram(s) IV Intermittent every 8 hours  saccharomyces boulardii 250 milliGRAM(s) Oral two times a day  senna 2 Tablet(s) Oral at bedtime  tamsulosin 0.4 milliGRAM(s) Oral at bedtime    MEDICATIONS  (PRN):  acetaminophen   Tablet .. 650 milliGRAM(s) Oral every 6 hours PRN Temp greater or equal to 38C (100.4F), Moderate Pain (4 - 6)  traMADol 50 milliGRAM(s) Oral three times a day PRN Moderate Pain (4 - 6)      ASSESSMENT / PLAN:  ----------------------------------------  79M with history of stroke, depression, hypertension, and coronary artery disease who was previously admitted for ventricular tachycardia found to have cardiomyopathy and required percutaneous coronary intervention to the left anterior descending artery and was discharged home with an external defibrillator vest. The patient presented with episodes of syncope and alarms from the defibrillator vest found to have ventricular tachycardia. The patient was placed on antiarrhythmic medications and underwent VT ablation. He subsequently had cardiac arrest and required intubation with initiation of vasopressor support and empiric antibiotics. An AICD was implanted and the patient subsequently extubated.    Ventricular tachycardia / Cardiac arrest - Status post VT ablation and AICD implantation. Cardiology and Electrophysiology consultation noted. Metoprolol and lisinopril were held due to hypotension. Levophed weaned off and on midodrine but had recurrent hypotension. Intravenous fluids administered, close hemodynamic monitoring.  Monitoring blood pressures. Under ICU management.    Respiratory failure / Suspected aspiration - On piperacillin/tazobactam. Infectious Disease consultation noted.    Coronary artery disease - Status post percutaneous coronary intervention. On aspirin and clopidogrel.     Atrial fibrillation - Paced rhythm on telemetry. To initiate anticoagulation once thought to be feasible by Electrophysiology.    Acute respiratory failure - Status post extubation. No dyspnea or hypoxia on examination. On empiric piperacillin/tazobactam.    Thrombocytopenia - Monitoring platelet count. No obvious bleeding noted. Awaiting HIT serology. Hematology consultation noted.    Acute kidney injury - Renal function improved. Monitoring urine output from Knox catheter.    Depression - On bupropion.

## 2020-07-24 NOTE — CONSULT NOTE ADULT - SUBJECTIVE AND OBJECTIVE BOX
REASON FOR CONSULT: Hypotension    CONSULT REQUESTED BY: EP     Patient is a 79y old  Male who presents with a chief complaint of VT (2020 09:19)      BRIEF HOSPITAL COURSE:   Patient is a 79 year old male with a pmhx of       Events last 24 hours:         PAST MEDICAL & SURGICAL HISTORY:  Stroke  Osteoarthritis  Hyperlipidemia  Depression  HTN (hypertension)  S/P stroke due to cerebrovascular disease    Allergies    No Known Allergies    Intolerances      FAMILY HISTORY:  Family history unknown      Review of Systems:  CONSTITUTIONAL: No fever, chills, or fatigue  EYES: No eye pain, visual disturbances, or discharge  ENMT:  No difficulty hearing, tinnitus, vertigo; No sinus or throat pain  NECK: No pain or stiffness  RESPIRATORY: No cough, wheezing, chills or hemoptysis; No shortness of breath  CARDIOVASCULAR: No chest pain, palpitations, dizziness, or leg swelling  GASTROINTESTINAL: No abdominal or epigastric pain. No nausea, vomiting, or hematemesis; No diarrhea or constipation. No melena or hematochezia.  GENITOURINARY: No dysuria, frequency, hematuria, or incontinence  NEUROLOGICAL: No headaches, memory loss, loss of strength, numbness, or tremors  SKIN: No itching, burning, rashes, or lesions   MUSCULOSKELETAL: No joint pain or swelling; No muscle, back, or extremity pain  PSYCHIATRIC: No depression, anxiety, mood swings, or difficulty sleeping      Medications:  piperacillin/tazobactam IVPB.. 3.375 Gram(s) IV Intermittent every 8 hours    midodrine 15 milliGRAM(s) Oral every 8 hours  tamsulosin 0.4 milliGRAM(s) Oral at bedtime      acetaminophen   Tablet .. 650 milliGRAM(s) Oral every 6 hours PRN  buPROPion XL . 150 milliGRAM(s) Oral daily  melatonin 5 milliGRAM(s) Oral at bedtime  traMADol 50 milliGRAM(s) Oral three times a day PRN      aspirin  chewable 81 milliGRAM(s) Oral daily  clopidogrel Tablet 75 milliGRAM(s) Oral daily    senna 2 Tablet(s) Oral at bedtime      atorvastatin 20 milliGRAM(s) Oral at bedtime    lactated ringers. 1000 milliLiter(s) IV Continuous <Continuous>      chlorhexidine 2% Cloths 1 Application(s) Topical <User Schedule>  chlorhexidine 4% Liquid 1 Application(s) Topical <User Schedule>  nystatin Cream 1 Application(s) Topical two times a day    saccharomyces boulardii 250 milliGRAM(s) Oral two times a day          ICU Vital Signs Last 24 Hrs  T(C): 36.6 (2020 08:00), Max: 37.6 (2020 12:01)  T(F): 97.9 (2020 08:00), Max: 99.7 (2020 12:01)  HR: 88 (2020 10:00) (59 - 93)  BP: 92/56 (2020 10:00) (56/41 - 147/78)  BP(mean): 66 (2020 10:00) (46 - 160)  ABP: --  ABP(mean): --  RR: 16 (2020 10:00) (12 - )  SpO2: 98% (2020 10:00) (90% - 100%)    Vital Signs Last 24 Hrs  T(C): 36.6 (2020 08:00), Max: 37.6 (2020 12:01)  T(F): 97.9 (2020 08:00), Max: 99.7 (2020 12:01)  HR: 88 (2020 10:00) (59 - 93)  BP: 92/56 (2020 10:00) (56/41 - 147/78)  BP(mean): 66 (2020 10:00) (46 - 160)  RR: 16 (2020 10:00) (12 - )  SpO2: 98% (2020 10:00) (90% - 100%)        I&O's Detail    2020 07:01  -  2020 07:00  --------------------------------------------------------  IN:    Oral Fluid: 150 mL    Solution: 275 mL  Total IN: 425 mL    OUT:    Indwelling Catheter - Urethral: 2525 mL  Total OUT: 2525 mL    Total NET: -2100 mL      2020 07:01  -  2020 10:29  --------------------------------------------------------  IN:    lactated ringers.: 60 mL    multiple electrolytes Injection Type 1 Bolus: 1000 mL    Solution: 50 mL  Total IN: 1110 mL    OUT:    Indwelling Catheter - Urethral: 500 mL  Total OUT: 500 mL    Total NET: 610 mL            LABS:                        10.8   11.99 )-----------( 97       ( 2020 09:18 )             32.3     07-    135  |  95<L>  |  22.0<H>  ----------------------------<  128<H>  3.5   |  27.0  |  0.96    Ca    8.6      2020 09:17  Phos  3.5     -  Mg     1.5         TPro  5.8<L>  /  Alb  3.2<L>  /  TBili  0.7  /  DBili  x   /  AST  15  /  ALT  9   /  AlkPhos  64            CAPILLARY BLOOD GLUCOSE          Urinalysis Basic - ( 2020 10:47 )    Color: Yellow / Appearance: Clear / S.025 / pH: x  Gluc: x / Ketone: Trace  / Bili: Negative / Urobili: Negative mg/dL   Blood: x / Protein: 30 mg/dL / Nitrite: Negative   Leuk Esterase: Small / RBC: 11-25 /HPF / WBC 3-5   Sq Epi: x / Non Sq Epi: Negative / Bacteria: Negative      CULTURES:  Culture Results:   Moderate Staphylococcus aureus  Normal Respiratory Rosana absent ( @ 06:41)      Physical Examination:    General: No acute distress.  Alert, oriented, interactive, nonfocal    HEENT: Pupils equal, reactive to light.  Symmetric.    PULM: Clear to auscultation bilaterally, no significant sputum production    CVS: Regular rate and rhythm, no murmurs, rubs, or gallops    ABD: Soft, nondistended, nontender, normoactive bowel sounds, no masses    EXT: No edema, nontender    SKIN: Warm and well perfused, no rashes noted.    RADIOLOGY: ***    CRITICAL CARE TIME SPENT: *** REASON FOR CONSULT: Hypotension    CONSULT REQUESTED BY: EP     Patient is a 79y old  Male who presents with a chief complaint of VT (2020 09:19)      BRIEF HOSPITAL COURSE:   Patient is a 79 year old male with a pmhx of HTN, HLD, CAD, OA, CVA (10 years ago) now wheel chair bound from OA/CVA, admitted 2020 for SVT that progressed to VT was successfully cardioverted, new onset ischemic cardiomyopathy s/p LHC and stent placement in 2020 presents to the ED c/o weakness and episodes of unresponsiveness. While at home the life vest alarm went off several times, he kept pressing a button to turn it off and then the vest would alarm again several minutes after.  He then decided to call 911, and took the LifeVest off at home. In ED pt went unresponsive in VT was started on lidocaine infusion.  He underwent another PCI to mLAD this admission, but continued to have episodes of prolonged VT.  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  That night patient, had TVP appeared to be capturing inappropriately on VVI w/rate of 30, intrinsic rate 50-60's.  TVP was turned off for fear of R on T precipitating an arrhythmia.  Pt subsequently underwent 2x 5-6 second pauses followed by asystolic arrest.  ACLS initiated, pt intubated, TVP turned back on and 1 epi given with ROSC. downtime ~3mins. Pt returned to EP lab 2020 for MDT CRT-D implant via left cephalic access.  Successfully extubated overnight 2020.  Noted to have new onset atrial flutter on 2020 AM.  Bedside atrial ATP through device performed by Dr Wright, with successful conversion to sinus rhythm however, pt now with recurrent atrial flutter. He was then transferred to the medical floor for further management. This morning patient had acute drop in BP, SBPs in low 60s, became altered and given stat bolus. Of note patient received 40mg lasix yesterday and had 2.3L of urine output yesterday. Re-admitted to ICU. Pt seen and examined, endorses weakness and fatigue. Denies CP, SOB, N/V/D. no bleeding         PAST MEDICAL & SURGICAL HISTORY:  Stroke  Osteoarthritis  Hyperlipidemia  Depression  HTN (hypertension)  S/P stroke due to cerebrovascular disease    Allergies    No Known Allergies    Intolerances      FAMILY HISTORY:  Family history unknown        Review of Systems:  CONSTITUTIONAL: No fever, chills, or + fatigue  EYES: No eye pain, visual disturbances, or discharge  ENMT:  No difficulty hearing, tinnitus, vertigo; No sinus or throat pain  NECK: No pain or stiffness  RESPIRATORY: No cough, wheezing, chills or hemoptysis; No shortness of breath  CARDIOVASCULAR: No chest pain, palpitations, dizziness, or leg swelling  GASTROINTESTINAL: No abdominal or epigastric pain. No nausea, vomiting, or hematemesis; No diarrhea or constipation. No melena or hematochezia.  GENITOURINARY: No dysuria, frequency, hematuria, or incontinence  NEUROLOGICAL: No headaches, memory loss, loss of strength, numbness, or tremors  SKIN: No itching, burning, rashes, or lesions   MUSCULOSKELETAL: No joint pain or swelling; No muscle, back, or extremity pain  PSYCHIATRIC: No depression, anxiety, mood swings, or difficulty sleeping      Medications:  piperacillin/tazobactam IVPB.. 3.375 Gram(s) IV Intermittent every 8 hours  midodrine 15 milliGRAM(s) Oral every 8 hours  tamsulosin 0.4 milliGRAM(s) Oral at bedtime  acetaminophen   Tablet .. 650 milliGRAM(s) Oral every 6 hours PRN  buPROPion XL . 150 milliGRAM(s) Oral daily  melatonin 5 milliGRAM(s) Oral at bedtime  traMADol 50 milliGRAM(s) Oral three times a day PRN  aspirin  chewable 81 milliGRAM(s) Oral daily  clopidogrel Tablet 75 milliGRAM(s) Oral daily  senna 2 Tablet(s) Oral at bedtime  atorvastatin 20 milliGRAM(s) Oral at bedtime  lactated ringers. 1000 milliLiter(s) IV Continuous <Continuous>  chlorhexidine 2% Cloths 1 Application(s) Topical <User Schedule>  chlorhexidine 4% Liquid 1 Application(s) Topical <User Schedule>  nystatin Cream 1 Application(s) Topical two times a day  saccharomyces boulardii 250 milliGRAM(s) Oral two times a day      ICU Vital Signs Last 24 Hrs  T(C): 36.6 (2020 08:00), Max: 37.6 (2020 12:01)  T(F): 97.9 (2020 08:00), Max: 99.7 (2020 12:01)  HR: 88 (2020 10:00) (59 - 93)  BP: 92/56 (2020 10:00) (56/41 - 147/78)  BP(mean): 66 (2020 10:00) (46 - 160)  RR: 16 (2020 10:00) (12 - 26)  SpO2: 98% (2020 10:00) (90% - 100%)      I&O's Detail    2020 07:01  -  2020 07:00  --------------------------------------------------------  IN:    Oral Fluid: 150 mL    Solution: 275 mL  Total IN: 425 mL    OUT:    Indwelling Catheter - Urethral: 2525 mL  Total OUT: 2525 mL    Total NET: -2100 mL      2020 07:01  -  2020 10:29  --------------------------------------------------------  IN:    lactated ringers.: 60 mL    multiple electrolytes Injection Type 1 Bolus: 1000 mL    Solution: 50 mL  Total IN: 1110 mL    OUT:    Indwelling Catheter - Urethral: 500 mL  Total OUT: 500 mL    Total NET: 610 mL            LABS:                        10.8   11.99 )-----------( 97       ( 2020 09:18 )             32.3     07-24    135  |  95<L>  |  22.0<H>  ----------------------------<  128<H>  3.5   |  27.0  |  0.96    Ca    8.6      2020 09:17  Phos  3.5     07-24  Mg     1.5     07-24    TPro  5.8<L>  /  Alb  3.2<L>  /  TBili  0.7  /  DBili  x   /  AST  15  /  ALT  9   /  AlkPhos  64  07-24          CAPILLARY BLOOD GLUCOSE          Urinalysis Basic - ( 2020 10:47 )    Color: Yellow / Appearance: Clear / S.025 / pH: x  Gluc: x / Ketone: Trace  / Bili: Negative / Urobili: Negative mg/dL   Blood: x / Protein: 30 mg/dL / Nitrite: Negative   Leuk Esterase: Small / RBC: 11-25 /HPF / WBC 3-5   Sq Epi: x / Non Sq Epi: Negative / Bacteria: Negative      CULTURES:  Culture Results:   Moderate Staphylococcus aureus  Normal Respiratory Rosana absent ( @ 06:41)      Physical Examination:    General: confused, awake, mentating in chair at bedside     HEENT: Pupils equal, reactive to light.  Symmetric.    PULM: Clear to auscultation bilaterally, no significant sputum production    CVS: Regular rate and rhythm, no murmurs, rubs, or gallops    ABD: Soft, nondistended, nontender, normoactive bowel sounds, no masses    EXT: No edema, nontender    SKIN: Warm and well perfused, no rashes noted.    RADIOLOGY: ***    CRITICAL CARE TIME SPENT: *** REASON FOR CONSULT: Hypotension    CONSULT REQUESTED BY: EP     Patient is a 79y old  Male who presents with a chief complaint of VT (2020 09:19)      BRIEF HOSPITAL COURSE:   Patient is a 79 year old male with a pmhx of HTN, HLD, CAD, OA, CVA (10 years ago) now wheel chair bound from OA/CVA, admitted 2020 for SVT that progressed to VT was successfully cardioverted, new onset ischemic cardiomyopathy s/p LHC and stent placement in 2020 presents to the ED c/o weakness and episodes of unresponsiveness. While at home the life vest alarm went off several times, he kept pressing a button to turn it off and then the vest would alarm again several minutes after.  He then decided to call 911, and took the LifeVest off at home. In ED pt went unresponsive in VT was started on lidocaine infusion.  He underwent another PCI to mLAD this admission, but continued to have episodes of prolonged VT.  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  That night patient, had TVP appeared to be capturing inappropriately on VVI w/rate of 30, intrinsic rate 50-60's.  TVP was turned off for fear of R on T precipitating an arrhythmia.  Pt subsequently underwent 2x 5-6 second pauses followed by asystolic arrest.  ACLS initiated, pt intubated, TVP turned back on and 1 epi given with ROSC. downtime ~3mins. Pt returned to EP lab 2020 for MDT CRT-D implant via left cephalic access.  Successfully extubated overnight 2020.  Noted to have new onset atrial flutter on 2020 AM.  Bedside atrial ATP through device performed by Dr Wright, with successful conversion to sinus rhythm however, pt now with recurrent atrial flutter. He was then transferred to the medical floor for further management. This morning patient had acute drop in BP, SBPs in low 60s, became altered and given stat bolus. Of note patient received 40mg lasix yesterday and had 2.3L of urine output yesterday. Re-admitted to ICU. Pt seen and examined, endorses weakness and fatigue. Denies CP, SOB, N/V/D. no bleeding         PAST MEDICAL & SURGICAL HISTORY:  Stroke  Osteoarthritis  Hyperlipidemia  Depression  HTN (hypertension)  S/P stroke due to cerebrovascular disease    Allergies    No Known Allergies    Intolerances      FAMILY HISTORY:  Family history unknown        Review of Systems:  CONSTITUTIONAL: No fever, chills, or + fatigue  EYES: No eye pain, visual disturbances, or discharge  ENMT:  No difficulty hearing, tinnitus, vertigo; No sinus or throat pain  NECK: No pain or stiffness  RESPIRATORY: No cough, wheezing, chills or hemoptysis; No shortness of breath  CARDIOVASCULAR: No chest pain, palpitations, dizziness, or leg swelling  GASTROINTESTINAL: No abdominal or epigastric pain. No nausea, vomiting, or hematemesis; No diarrhea or constipation. No melena or hematochezia.  GENITOURINARY: No dysuria, frequency, hematuria, or incontinence  NEUROLOGICAL: No headaches, memory loss, loss of strength, numbness, or tremors  SKIN: No itching, burning, rashes, or lesions   MUSCULOSKELETAL: No joint pain or swelling; No muscle, back, or extremity pain  PSYCHIATRIC: No depression, anxiety, mood swings, or difficulty sleeping      Medications:  piperacillin/tazobactam IVPB.. 3.375 Gram(s) IV Intermittent every 8 hours  midodrine 15 milliGRAM(s) Oral every 8 hours  tamsulosin 0.4 milliGRAM(s) Oral at bedtime  acetaminophen   Tablet .. 650 milliGRAM(s) Oral every 6 hours PRN  buPROPion XL . 150 milliGRAM(s) Oral daily  melatonin 5 milliGRAM(s) Oral at bedtime  traMADol 50 milliGRAM(s) Oral three times a day PRN  aspirin  chewable 81 milliGRAM(s) Oral daily  clopidogrel Tablet 75 milliGRAM(s) Oral daily  senna 2 Tablet(s) Oral at bedtime  atorvastatin 20 milliGRAM(s) Oral at bedtime  lactated ringers. 1000 milliLiter(s) IV Continuous <Continuous>  chlorhexidine 2% Cloths 1 Application(s) Topical <User Schedule>  chlorhexidine 4% Liquid 1 Application(s) Topical <User Schedule>  nystatin Cream 1 Application(s) Topical two times a day  saccharomyces boulardii 250 milliGRAM(s) Oral two times a day      ICU Vital Signs Last 24 Hrs  T(C): 36.6 (2020 08:00), Max: 37.6 (2020 12:01)  T(F): 97.9 (2020 08:00), Max: 99.7 (2020 12:01)  HR: 88 (2020 10:00) (59 - 93)  BP: 92/56 (2020 10:00) (56/41 - 147/78)  BP(mean): 66 (2020 10:00) (46 - 160)  RR: 16 (2020 10:00) (12 - 26)  SpO2: 98% (2020 10:00) (90% - 100%)      I&O's Detail    2020 07:01  -  2020 07:00  --------------------------------------------------------  IN:    Oral Fluid: 150 mL    Solution: 275 mL  Total IN: 425 mL    OUT:    Indwelling Catheter - Urethral: 2525 mL  Total OUT: 2525 mL    Total NET: -2100 mL      2020 07:01  -  2020 10:29  --------------------------------------------------------  IN:    lactated ringers.: 60 mL    multiple electrolytes Injection Type 1 Bolus: 1000 mL    Solution: 50 mL  Total IN: 1110 mL    OUT:    Indwelling Catheter - Urethral: 500 mL  Total OUT: 500 mL    Total NET: 610 mL            LABS:                        10.8   11.99 )-----------( 97       ( 2020 09:18 )             32.3     07-24    135  |  95<L>  |  22.0<H>  ----------------------------<  128<H>  3.5   |  27.0  |  0.96    Ca    8.6      2020 09:17  Phos  3.5     07-24  Mg     1.5     07-24    TPro  5.8<L>  /  Alb  3.2<L>  /  TBili  0.7  /  DBili  x   /  AST  15  /  ALT  9   /  AlkPhos  64  07-24          CAPILLARY BLOOD GLUCOSE          Urinalysis Basic - ( 2020 10:47 )    Color: Yellow / Appearance: Clear / S.025 / pH: x  Gluc: x / Ketone: Trace  / Bili: Negative / Urobili: Negative mg/dL   Blood: x / Protein: 30 mg/dL / Nitrite: Negative   Leuk Esterase: Small / RBC: 11-25 /HPF / WBC 3-5   Sq Epi: x / Non Sq Epi: Negative / Bacteria: Negative      CULTURES:  Culture Results:   Moderate Staphylococcus aureus  Normal Respiratory Rosana absent ( @ 06:41)      Physical Examination:    General: confused, awake, mentating in chair at bedside     HEENT: Pupils equal, reactive to light.  Symmetric.    PULM: Clear to auscultation bilaterally, no significant sputum production    CVS: +s1/s2    ABD: Soft, nondistended, nontender, normoactive bowel sounds, no masses    EXT: trace edema    SKIN: Warm and well perfused    Neuro: confused    RADIOLOGY:   < from: Xray Chest 1 View- PORTABLE-Urgent (20 @ 13:26) >  INTERPRETATION:  Portable chest radiograph        CLINICAL INFORMATION:   Coronary artery bypass graft implant    TECHNIQUE:  Portable  AP view of thechest was obtained.    COMPARISON: 2020 available for review.    FINDINGS:   The lungs are clear of airspace consolidations or effusions. No pneumothorax.  RIGHT IJ catheter tip in SVC.  The heart and mediastinum are within normal limits.  Rightatrium and biventricular cardiac wire leads in place.  Visualized osseous structures are intact.        IMPRESSION:   No evidence of active chest disease.  Right atrium and biventricular cardiac wire leads in place.          Critical Care time: 35 mins assessing presenting problems of acute illness that poses high probability of life threatening deterioration or end organ damage/dysfunction.  Medical decision making inculding Initiating plan of care, reviewing data, reviewing radiology,direct patient bedside evaluation and interpretation of vital signs, any necessary ventilator management , discusion with multidisciplinary team, discussing goals of care with patient/family, all non inclusive of procedures

## 2020-07-24 NOTE — PROGRESS NOTE ADULT - SUBJECTIVE AND OBJECTIVE BOX
Pt seen sitting in chair in MICU this am. Pt was downgraded to telemetry yesterday under Dr Jos Diaz/hospitalist service.  BP this am 76/40, pt received IV lasix yesterday. He offers no specific complaints, just overall fatigue.  CBC with mild decrease in platelets and H/H, no obvious source of bleeding identified. (CRT-D site and bilateral groin sites stable.      TELE: atrial flutter with Biventricular pacing    MEDICATIONS  (STANDING):  aspirin  chewable 81 milliGRAM(s) Oral daily  atorvastatin 20 milliGRAM(s) Oral at bedtime  buPROPion XL . 150 milliGRAM(s) Oral daily  chlorhexidine 2% Cloths 1 Application(s) Topical <User Schedule>  chlorhexidine 4% Liquid 1 Application(s) Topical <User Schedule>  clopidogrel Tablet 75 milliGRAM(s) Oral daily  lactated ringers. 1000 milliLiter(s) (60 mL/Hr) IV Continuous <Continuous>  melatonin 5 milliGRAM(s) Oral at bedtime  midodrine 15 milliGRAM(s) Oral every 8 hours  nystatin Cream 1 Application(s) Topical two times a day  piperacillin/tazobactam IVPB.. 3.375 Gram(s) IV Intermittent every 8 hours  saccharomyces boulardii 250 milliGRAM(s) Oral two times a day  senna 2 Tablet(s) Oral at bedtime  tamsulosin 0.4 milliGRAM(s) Oral at bedtime      Vital Signs Last 24 Hrs  T(C): 36.6 (2020 08:00), Max: 37.6 (2020 12:01)  T(F): 97.9 (2020 08:00), Max: 99.7 (2020 12:01)  HR: 88 (2020 10:00) (59 - 93)  BP: 92/56 (2020 10:00) (56/41 - 147/78)  BP(mean): 66 (2020 10:00) (46 - 160)  RR: 16 (2020 10:00) (12 - 26)  SpO2: 98% (2020 10:00) (90% - 100%)    Physical Exam:  Constitutional: awake, alert sitting in chair, tongue pale and dry   Cardiovascular: +S1S2 RRR,   LACW device site +steristrips intact without bleeding, swelling, hematoma   Pulmonary: decreased at bases, poor effort   GI: soft NTND +BS  Extremities: no edema  Groins: Right soft, non tender, +ecchymosis, no hematoma, no audible bruit. Left groin: +ecchymosis extending to testicles and around left flank. +testicular swelling consistent with prior exams and sonograms revealing large inguinal hernia and hydroceles. No audible bruit.  Neuro: non focal, speech clear  +Knox->BSG valdez urine  +LUE midline, site wnl    LABS:                        10.8   11.99 )-----------( 97       ( 2020 09:18 )             32.3     07-24    135  |  95<L>  |  22.0<H>  ----------------------------<  128<H>  3.5   |  27.0  |  0.96    Ca    8.6      2020 09:17  Phos  3.5     07-24  Mg     1.5     07-24    TPro  5.8<L>  /  Alb  3.2<L>  /  TBili  0.7  /  DBili  x   /  AST  15  /  ALT  9   /  AlkPhos  64  07-24      Urinalysis Basic - ( 2020 10:47 )    Color: Yellow / Appearance: Clear / S.025 / pH: x  Gluc: x / Ketone: Trace  / Bili: Negative / Urobili: Negative mg/dL   Blood: x / Protein: 30 mg/dL / Nitrite: Negative   Leuk Esterase: Small / RBC: 11-25 /HPF / WBC 3-5   Sq Epi: x / Non Sq Epi: Negative / Bacteria: Negative    RADIOLOGY & ADDITIONAL TESTS:    TTE 2020: enlarged LA, EF <20%, moderately reduced RVFx,     A/P: 79 year old male w/PMHx significant for CVA, ischemic cardiomyopathy HFrEF <20% with monomorphic VT, s/p PCI to ostial lad 2020 who presented to [facility] with syncope and recurrent VT.  He underwent another PCI to mLAD this admission, but continued to have episodes of prolonged VT.  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  Overnight 2020 TVP appeared to be capturing inappropriately on VVI w/rate of 30, intrinsic rate 50-60's.  TVP was turned off for fear of R on T precipitating an arrhythmia.  Pt subsequently underwent 2x 5-6 second pauses followed by asystolic arrest.  ACLS initiated, pt intubated, TVP turned back on and 1 epi given with ROSC.  Pt returned to EP lab 2020 for MDT CRT-D implant via left cephalic access.  Successfully extubated overnight 2020.  Noted to have new onset atrial flutter on 2020 AM.  Bedside atrial ATP through device performed by Dr Wright, with successful conversion to sinus rhythm however, pt now with recurrent atrial flutter.  NOAC held due to recent CRT-D implant and drop in H/H and PLTs. Pt downgraded to medicine service overnight.  On exam this morning pt is hypotensive sitting in chair 76/40, with complaints of fatigue.    1. Hypotensive this morning  - 500cc plasmalyte given with good response, hold anti-HTN and diuretics  - repeat CBC, possible dilutional changes vs draws from midline with inadequate waste  - concern for underlying sepsis- bld cultures x 2, lactate level, ID consult (Dr Brambila contacted), repeat CXR  - r/o occult bleed, groins are stable on exam, but with recent vascular access will check non-contrast CT ABD/Pelvis to rule out RP bleed   - Please consider full 10 days course antibiotic, can be changed to oral upon discharge.   - Remove all lines/catheters as clinically feasible   - ICU reconsult for upgrade     2. thrombocytopenia  - Hematology consult requested- Dr Zavaleta contacted  - Heparin antibody ordered   - Drop in PLT, HIT score 5 (intermediate). Avoid all heparin products, and monitor platelet.    3. MDT CRT-D implant   -Pain control with PO analgesia PRN.   -pt weak and unable to have CXR PA/LAT at this time, will plan for quality portable CXR on 2020 and CXR PA/LAT prior to d/c if possible (baseline is wheelchair bound)  -NO HEPARIN OR LOVENOX, INCLUDING PROPHYLACTIC/SUBCUT DOSING, UNTIL OTHERWISE ADVISED BY EP.   -b/l LE compression device for DVT prophylaxis   -IV zosyn started secondary to concern for aspiration pneumonia   -no lifting left arm over shoulder x 6 weeks  -outpt follow up 2 weeks, [facility] for site and device check    4. New onset atrial flutter  - Bedside trial ATP through device performed by Dr. Wright with successful conversion to sinus rhythm on 2020.  Now with recurrent atrial flutter.  No further intervention for Aflutter at this time, rate is controlled    - Pending ID and hematology evaluations will consider Eliquis 5mgPO BID. Will d/w interventionalist triple therapy in setting of recent PCI  - ultimately pt may benefit from atrial flutter ablation and enabling atrial ATP algorithms once pt is anticoagulated and leads have matured.    5. ICM/HFrEF  - cont GDMT  when BP allows please resume metoprolol & Lisinopril  - cont asa/plavix         Seen with Dr. Wright, agrees Pt seen sitting in chair in MICU this am. Pt was downgraded to telemetry yesterday under Dr Jos Diaz/hospitalist service.  BP this am 76/40, pt received IV lasix yesterday. He offers no specific complaints, just overall fatigue.  CBC with mild decrease in platelets and H/H, no obvious source of bleeding identified. (CRT-D site and bilateral groin sites stable.      TELE: atrial flutter with Biventricular pacing    MEDICATIONS  (STANDING):  aspirin  chewable 81 milliGRAM(s) Oral daily  atorvastatin 20 milliGRAM(s) Oral at bedtime  buPROPion XL . 150 milliGRAM(s) Oral daily  chlorhexidine 2% Cloths 1 Application(s) Topical <User Schedule>  chlorhexidine 4% Liquid 1 Application(s) Topical <User Schedule>  clopidogrel Tablet 75 milliGRAM(s) Oral daily  lactated ringers. 1000 milliLiter(s) (60 mL/Hr) IV Continuous <Continuous>  melatonin 5 milliGRAM(s) Oral at bedtime  midodrine 15 milliGRAM(s) Oral every 8 hours  nystatin Cream 1 Application(s) Topical two times a day  piperacillin/tazobactam IVPB.. 3.375 Gram(s) IV Intermittent every 8 hours  saccharomyces boulardii 250 milliGRAM(s) Oral two times a day  senna 2 Tablet(s) Oral at bedtime  tamsulosin 0.4 milliGRAM(s) Oral at bedtime      Vital Signs Last 24 Hrs  T(C): 36.6 (2020 08:00), Max: 37.6 (2020 12:01)  T(F): 97.9 (2020 08:00), Max: 99.7 (2020 12:01)  HR: 88 (2020 10:00) (59 - 93)  BP: 92/56 (2020 10:00) (56/41 - 147/78)  BP(mean): 66 (2020 10:00) (46 - 160)  RR: 16 (2020 10:00) (12 - 26)  SpO2: 98% (2020 10:00) (90% - 100%)    Physical Exam:  Constitutional: awake, alert sitting in chair, tongue pale and dry   Cardiovascular: +S1S2 RRR,   LACW device site +steristrips intact without bleeding, swelling, hematoma   Pulmonary: decreased at bases, poor effort   GI: soft NTND +BS  Extremities: no edema  Groins: Right soft, non tender, +ecchymosis, no hematoma, no audible bruit. Left groin: +ecchymosis extending to testicles and around left flank. +testicular swelling consistent with prior exams and sonograms revealing large inguinal hernia and hydroceles. No audible bruit.  Neuro: non focal, speech clear  +Knox->BSG valdez urine  +LUE midline, site wnl    LABS:                        10.8   11.99 )-----------( 97       ( 2020 09:18 )             32.3     07-24    135  |  95<L>  |  22.0<H>  ----------------------------<  128<H>  3.5   |  27.0  |  0.96    Ca    8.6      2020 09:17  Phos  3.5     07-24  Mg     1.5     07-24    TPro  5.8<L>  /  Alb  3.2<L>  /  TBili  0.7  /  DBili  x   /  AST  15  /  ALT  9   /  AlkPhos  64  07-24      Urinalysis Basic - ( 2020 10:47 )    Color: Yellow / Appearance: Clear / S.025 / pH: x  Gluc: x / Ketone: Trace  / Bili: Negative / Urobili: Negative mg/dL   Blood: x / Protein: 30 mg/dL / Nitrite: Negative   Leuk Esterase: Small / RBC: 11-25 /HPF / WBC 3-5   Sq Epi: x / Non Sq Epi: Negative / Bacteria: Negative    RADIOLOGY & ADDITIONAL TESTS:    TTE 2020: enlarged LA, EF <20%, moderately reduced RVFx,     A/P: 79 year old male w/PMHx significant for CVA, ischemic cardiomyopathy HFrEF <20% with monomorphic VT, s/p PCI to ostial lad 2020 who presented to [facility] with syncope and recurrent VT.  He underwent another PCI to mLAD this admission, but continued to have episodes of prolonged VT.  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  Overnight 2020 TVP appeared to be capturing inappropriately on VVI w/rate of 30, intrinsic rate 50-60's.  TVP was turned off for fear of R on T precipitating an arrhythmia.  Pt subsequently underwent 2x 5-6 second pauses followed by asystolic arrest.  ACLS initiated, pt intubated, TVP turned back on and 1 epi given with ROSC.  Pt returned to EP lab 2020 for MDT CRT-D implant via left cephalic access.  Successfully extubated overnight 2020.  Noted to have new onset atrial flutter on 2020 AM.  Bedside atrial ATP through device performed by Dr Wright, with successful conversion to sinus rhythm however, pt now with recurrent atrial flutter.  NOAC held due to recent CRT-D implant and drop in H/H and PLTs. Pt downgraded to medicine service overnight.  On exam this morning pt is hypotensive sitting in chair 76/40, with complaints of fatigue.    1. Hypotensive this morning  - 500cc plasmalyte given with good response, hold anti-HTN and diuretics  - repeat CBC, possible dilutional changes vs draws from midline with inadequate waste  - concern for underlying sepsis- bld cultures x 2, lactate level, ID consult (Dr Brambila contacted), repeat CXR  - r/o occult bleed, groins are stable on exam, but with recent vascular access will check non-contrast CT ABD/Pelvis to rule out RP bleed   - Please consider full 10 days course antibiotic, can be changed to oral upon discharge.   - Remove all lines/catheters as clinically feasible   - ICU reconsult for upgrade     2. thrombocytopenia  - Hematology consult requested- Dr Zavaleta contacted  - Heparin antibody ordered   - Drop in PLT, HIT score 5 (intermediate). Avoid all heparin products, and monitor platelet.    3. MDT CRT-D implant   -Pain control with PO analgesia PRN.   -pt weak and unable to have CXR PA/LAT at this time, will plan for quality portable CXR on 2020 and CXR PA/LAT prior to d/c if possible (baseline is wheelchair bound)  -NO HEPARIN OR LOVENOX, INCLUDING PROPHYLACTIC/SUBCUT DOSING, UNTIL OTHERWISE ADVISED BY EP.   -b/l LE compression device for DVT prophylaxis   -IV zosyn started secondary to concern for aspiration pneumonia   -no lifting left arm over shoulder x 6 weeks  -outpt follow up 2 weeks, [facility] for site and device check    4. New onset atrial flutter  - Bedside trial ATP through device performed by Dr. Wright with successful conversion to sinus rhythm on 2020.  Now with recurrent atrial flutter.  No further intervention for Aflutter at this time, rate is controlled    - Pending ID and hematology evaluations will consider Eliquis 5mgPO BID. Will d/w interventionalist triple therapy in setting of recent PCI  - ultimately pt may benefit from atrial flutter ablation and enabling atrial ATP algorithms once pt is anticoagulated and leads have matured.    5. 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 ICM/HFrEF  - cont GDMT  when BP allows please resume metoprolol & Lisinopril  - cont asa/plavix         Seen with Dr. Wright, agrees Pt seen sitting in chair in MICU this am. Pt was downgraded to telemetry yesterday under Dr Jos Diaz/hospitalist service.  BP this am 76/40, pt received IV lasix yesterday. He offers no specific complaints, just overall fatigue.  CBC with mild decrease in platelets and H/H, no obvious source of bleeding identified. (CRT-D site and bilateral groin sites stable.      TELE: atrial flutter with Biventricular pacing    MEDICATIONS  (STANDING):  aspirin  chewable 81 milliGRAM(s) Oral daily  atorvastatin 20 milliGRAM(s) Oral at bedtime  buPROPion XL . 150 milliGRAM(s) Oral daily  chlorhexidine 2% Cloths 1 Application(s) Topical <User Schedule>  chlorhexidine 4% Liquid 1 Application(s) Topical <User Schedule>  clopidogrel Tablet 75 milliGRAM(s) Oral daily  lactated ringers. 1000 milliLiter(s) (60 mL/Hr) IV Continuous <Continuous>  melatonin 5 milliGRAM(s) Oral at bedtime  midodrine 15 milliGRAM(s) Oral every 8 hours  nystatin Cream 1 Application(s) Topical two times a day  piperacillin/tazobactam IVPB.. 3.375 Gram(s) IV Intermittent every 8 hours  saccharomyces boulardii 250 milliGRAM(s) Oral two times a day  senna 2 Tablet(s) Oral at bedtime  tamsulosin 0.4 milliGRAM(s) Oral at bedtime      Vital Signs Last 24 Hrs  T(C): 36.6 (2020 08:00), Max: 37.6 (2020 12:01)  T(F): 97.9 (2020 08:00), Max: 99.7 (2020 12:01)  HR: 88 (2020 10:00) (59 - 93)  BP: 92/56 (2020 10:00) (56/41 - 147/78)  BP(mean): 66 (2020 10:00) (46 - 160)  RR: 16 (2020 10:00) (12 - 26)  SpO2: 98% (2020 10:00) (90% - 100%)    Physical Exam:  Constitutional: awake, alert sitting in chair, tongue pale and dry   Cardiovascular: +S1S2 RRR,   LACW device site +steristrips intact without bleeding, swelling, hematoma   Pulmonary: decreased at bases, poor effort   GI: soft NTND +BS  Extremities: no edema  Groins: Right soft, non tender, +ecchymosis, no hematoma, no audible bruit. Left groin: +ecchymosis extending to testicles and around left flank. +testicular swelling consistent with prior exams and sonograms revealing large inguinal hernia and hydroceles. No audible bruit.  Neuro: non focal, speech clear  +Knox->BSG valdez urine  +LUE midline, site wnl    LABS:                        10.8   11.99 )-----------( 97       ( 2020 09:18 )             32.3     07-24    135  |  95<L>  |  22.0<H>  ----------------------------<  128<H>  3.5   |  27.0  |  0.96    Ca    8.6      2020 09:17  Phos  3.5     07-24  Mg     1.5     07-24    TPro  5.8<L>  /  Alb  3.2<L>  /  TBili  0.7  /  DBili  x   /  AST  15  /  ALT  9   /  AlkPhos  64  07-24      Urinalysis Basic - ( 2020 10:47 )    Color: Yellow / Appearance: Clear / S.025 / pH: x  Gluc: x / Ketone: Trace  / Bili: Negative / Urobili: Negative mg/dL   Blood: x / Protein: 30 mg/dL / Nitrite: Negative   Leuk Esterase: Small / RBC: 11-25 /HPF / WBC 3-5   Sq Epi: x / Non Sq Epi: Negative / Bacteria: Negative    RADIOLOGY & ADDITIONAL TESTS:    TTE 2020: enlarged LA, EF <20%, moderately reduced RVFx,     A/P: 79 year old male w/PMHx significant for CVA, ischemic cardiomyopathy HFrEF <20% with monomorphic VT, s/p PCI to ostial lad 2020 who presented to [facility] with syncope and recurrent VT.  He underwent another PCI to mLAD this admission, but continued to have episodes of prolonged VT.  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  Overnight 2020 TVP appeared to be capturing inappropriately on VVI w/rate of 30, intrinsic rate 50-60's.  TVP was turned off for fear of R on T precipitating an arrhythmia.  Pt subsequently underwent 2x 5-6 second pauses followed by asystolic arrest.  ACLS initiated, pt intubated, TVP turned back on and 1 epi given with ROSC.  Pt returned to EP lab 2020 for MDT CRT-D implant via left cephalic access.  Successfully extubated overnight 2020.  Noted to have new onset atrial flutter on 2020 AM.  Bedside atrial ATP through device performed by Dr Wright, with successful conversion to sinus rhythm however, pt now with recurrent atrial flutter.  NOAC held due to recent CRT-D implant and drop in H/H and PLTs. Pt downgraded to medicine service overnight.  On exam this morning pt is hypotensive sitting in chair 76/40, with complaints of fatigue.    1. Hypotensive this morning  - 500cc plasmalyte given with good response, hold anti-HTN and diuretics  - repeat CBC, possible dilutional changes vs draws from midline with inadequate waste  - concern for underlying sepsis- bld cultures x 2, lactate level, ID consult (Dr Brambila contacted), repeat CXR  - r/o occult bleed, groins are stable on exam, but with recent vascular access will check non-contrast CT ABD/Pelvis to rule out RP bleed   - Please consider full 10 days course antibiotic, can be changed to oral upon discharge.   - Remove all lines/catheters as clinically feasible   - ICU reconsult for upgrade     2. thrombocytopenia  - Hematology consult requested- Dr Zavaleta contacted  - Heparin antibody ordered   - Drop in PLT, HIT score 5 (intermediate). Avoid all heparin products, and monitor platelet.    3. MDT CRT-D implant   -Pain control with PO analgesia PRN.   -pt weak and unable to have CXR PA/LAT at this time, will plan for quality portable CXR on 2020 and CXR PA/LAT prior to d/c if possible (baseline is wheelchair bound)  -NO HEPARIN OR LOVENOX, INCLUDING PROPHYLACTIC/SUBCUT DOSING, UNTIL OTHERWISE ADVISED BY EP.   -b/l LE compression device for DVT prophylaxis   -IV zosyn started secondary to concern for aspiration pneumonia   -no lifting left arm over shoulder x 6 weeks  -outpt follow up 2 weeks, [facility] for site and device check    4. New onset atrial flutter  - Bedside trial ATP through device performed by Dr. Wright with successful conversion to sinus rhythm on 2020.  Now with recurrent atrial flutter.  No further intervention for Aflutter at this time, rate is controlled    - Pending ID and hematology evaluations will consider Eliquis 5mgPO BID. Will d/w interventionalist triple therapy in setting of recent PCI  - ultimately pt may benefit from atrial flutter ablation and enabling atrial ATP algorithms once pt is anticoagulated and leads have matured.    5. 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 ICM/HFrEF  - cont GDMT  when BP allows please resume metoprolol & Lisinopril  - cont asa/plavix       7. No anticoagulation for now. Abdo-pelvis CT with no contrast to r/o hematoma or RPB.         Seen with Dr. Wright, agrees

## 2020-07-25 NOTE — PROGRESS NOTE ADULT - ASSESSMENT
79 years old male with history of stroke, depression, hypertension, and coronary artery disease who was previously admitted for ventricular tachycardia found to have cardiomyopathy and required percutaneous coronary intervention to the left anterior descending artery and was discharged home with an external defibrillator vest. The patient presented with episodes of syncope and alarms from the defibrillator vest found to have ventricular tachycardia. The patient was placed on antiarrhythmic medications and underwent VT ablation. He subsequently had cardiac arrest and required intubation with initiation of vasopressor support and empiric antibiotics. An AICD was implanted and the patient subsequently extubated.    1) Ventricular tachycardia / Cardiac arrest   - Status post VT ablation and AICD implantation  - Cardiology and Electrophysiology consultation noted  - Metoprolol and Lisinopril on hold due to hypotension  - Levophed weaned off and on Midodrine now     2) Acute Respiratory Failure / Suspected Aspiration  - s/p extubation  - Continue Zosyn   - ID following     3) CAD   - s/p PCI  - Continue Aspirin, Plavix and Lipitor  - Lisinopril and Metoprolol on hold due to hypotension    4) New Onset Atrial Flutter  - Rate controlled  - AC once thought to be feasible by Electrophysiology    5) Thrombocytopenia  - HIT ruled out  - No signs of bleeding  - Monitor     6) CARLA   - Resolved  - Monitor renal function    7) Urinary Retention  - Failed voiding trials  - Continue Knox's Catheter   - Continue Flomax     8) Left Inguinal Hernia  - Outpatient follow up     9) Depression   - Continue Bupropion     DVT Prophylaxis -- Lovenox 40 mg SQ    Dispo: ANT in 48 hours 79 years old male with history of stroke, depression, hypertension, and coronary artery disease who was previously admitted for ventricular tachycardia found to have cardiomyopathy and required percutaneous coronary intervention to the left anterior descending artery and was discharged home with an external defibrillator vest. The patient presented with episodes of syncope and alarms from the defibrillator vest found to have ventricular tachycardia. The patient was placed on antiarrhythmic medications and underwent VT ablation. He subsequently had cardiac arrest and required intubation with initiation of vasopressor support and empiric antibiotics. An AICD was implanted and the patient subsequently extubated.    1) Ventricular tachycardia / Cardiac arrest   - Status post VT ablation and AICD implantation  - Cardiology and Electrophysiology consultation noted  - Metoprolol and Lisinopril on hold due to hypotension  - Levophed weaned off and now on Midodrine     2) Acute Respiratory Failure / Suspected Aspiration  - s/p extubation  - Continue Zosyn   - ID following     3) CAD   - s/p PCI  - Continue Aspirin, Plavix and Lipitor  - Lisinopril and Metoprolol on hold due to hypotension    4) New Onset Atrial Flutter  - Rate controlled  - AC once thought to be feasible by Electrophysiology    5) Thrombocytopenia  - HIT ruled out  - No signs of bleeding  - Monitor     6) CARLA   - Resolved  - Monitor renal function    7) Urinary Retention  - Failed voiding trials  - Continue Knox's Catheter   - Continue Flomax     8) Left Inguinal Hernia  - Outpatient follow up     9) Depression   - Continue Bupropion     DVT Prophylaxis -- Lovenox 40 mg SQ    Dispo: ANT in 48 hours 79 years old male with history of stroke, depression, hypertension, and coronary artery disease who was previously admitted for ventricular tachycardia found to have cardiomyopathy and required percutaneous coronary intervention to the left anterior descending artery and was discharged home with an external defibrillator vest. The patient presented with episodes of syncope and alarms from the defibrillator vest found to have ventricular tachycardia. The patient was placed on antiarrhythmic medications and underwent VT ablation. He subsequently had cardiac arrest and required intubation with initiation of vasopressor support and empiric antibiotics. An AICD was implanted and the patient subsequently extubated.    1) Ventricular tachycardia / Cardiac arrest   - Status post VT ablation and AICD implantation  - Cardiology and Electrophysiology consultation noted  - Metoprolol and Lisinopril on hold due to hypotension  - Levophed weaned off and now on Midodrine     2) Acute Respiratory Failure / Suspected Aspiration  - s/p extubation  - Continue Zosyn   - ID following     3) CAD   - s/p PCI  - Continue Aspirin, Plavix and Lipitor  - Lisinopril and Metoprolol on hold due to hypotension    4) New Onset Atrial Flutter  - Rate controlled  - AC once thought to be feasible by Electrophysiology    5) Thrombocytopenia  - HIT ruled out  - No signs of bleeding  - Monitor     6) CARLA   - Resolved  - Monitor renal function    7) Urinary Retention  - Failed voiding trials  - Continue Knox's Catheter   - Continue Flomax     8) Left Inguinal Hernia  - Outpatient follow up     9) Depression   - Continue Bupropion     10) Hypokalemia  - Replaced    DVT Prophylaxis -- Lovenox 40 mg SQ    Dispo: ANT in 48 hours

## 2020-07-25 NOTE — PROGRESS NOTE ADULT - PROBLEM SELECTOR PLAN 3
Extubated several days ago  continue with aggressive chest PT  pt able to self suction if needed  on zosyn for possible aspiration event during cardiac arrest will complete course

## 2020-07-25 NOTE — PROGRESS NOTE ADULT - SUBJECTIVE AND OBJECTIVE BOX
INFECTIOUS DISEASES AND INTERNAL MEDICINE at [place]  =======================================================  Andrey Johnson MD  Diplomates American Board of Internal Medicine and Infectious Diseases  Telephone [phone]  Fax            [phone]  =======================================================    JADASASKIA EMANUEL [id number]    Follow up: PNUEMONIA    Allergies:  No Known Allergies      Medications:  acetaminophen   Tablet .. 650 milliGRAM(s) Oral every 6 hours PRN  aspirin  chewable 81 milliGRAM(s) Oral daily  atorvastatin 20 milliGRAM(s) Oral at bedtime  buPROPion XL . 150 milliGRAM(s) Oral daily  chlorhexidine 2% Cloths 1 Application(s) Topical <User Schedule>  chlorhexidine 4% Liquid 1 Application(s) Topical <User Schedule>  clopidogrel Tablet 75 milliGRAM(s) Oral daily  enoxaparin Injectable 40 milliGRAM(s) SubCutaneous daily  melatonin 5 milliGRAM(s) Oral at bedtime  midodrine 15 milliGRAM(s) Oral every 8 hours  nystatin Cream 1 Application(s) Topical two times a day  piperacillin/tazobactam IVPB.. 3.375 Gram(s) IV Intermittent every 8 hours  saccharomyces boulardii 250 milliGRAM(s) Oral two times a day  senna 2 Tablet(s) Oral at bedtime  tamsulosin 0.4 milliGRAM(s) Oral at bedtime    SOCIAL       FAMILY   FAMILY HISTORY:  Family history unknown    REVIEW OF SYSTEMS:  CONSTITUTIONAL:  No Fever or chills  HEENT:   No diplopia or blurred vision.  No earache, sore throat or runny nose.  CARDIOVASCULAR:  No pressure, squeezing, strangling, tightness, heaviness or aching about the chest, neck, axilla or epigastrium.  RESPIRATORY:   cough, shortness of breath,    GASTROINTESTINAL:  No nausea, vomiting or diarrhea.  GENITOURINARY:  No dysuria, frequency or urgency. No Blood in urine  MUSCULOSKELETAL:   AS PER HPI  SKIN:  No change in skin, hair or nails.  NEUROLOGIC:  No paresthesias, fasciculations, seizures or weakness.            Physical Exam:  ICU Vital Signs Last 24 Hrs  T(C): 36.7 (25 Jul 2020 11:39), Max: 36.8 (25 Jul 2020 04:15)  T(F): 98.1 (25 Jul 2020 11:39), Max: 98.2 (25 Jul 2020 04:15)  HR: 66 (25 Jul 2020 13:00) (59 - 87)  BP: 95/49 (25 Jul 2020 13:00) (78/48 - 135/70)  BP(mean): 69 (25 Jul 2020 13:00) (58 - 98)  ABP: --  ABP(mean): --  RR: 16 (25 Jul 2020 13:00) (10 - 28)  SpO2: 97% (25 Jul 2020 13:00) (95% - 100%)    GEN: NAD,  OOB TO CHAIR   HEENT: normocephalic and atraumatic. EOMI. TONNY.    NECK: Supple. No carotid bruits.  No lymphadenopathy or thyromegaly.  LUNGS: Clear to auscultation.  HEART: Regular rate and rhythm without murmur.  ABDOMEN: Soft, nontender, and nondistended.  Positive bowel sounds.    : No CVA tenderness  EXTREMITIES: Without any cyanosis, clubbing, rash, lesions or edema.  MSK: no joint swelling  NEUROLOGIC: Cranial nerves II through XII are grossly intact.  PSYCHIATRIC: Appropriate affect .  SKIN: No ulceration or induration present.        Labs:                          9.4    7.58  )-----------( 89       ( 25 Jul 2020 05:35 )             28.4   07-25    138  |  96<L>  |  19.0  ----------------------------<  106<H>  3.3<L>   |  29.0  |  0.72    Ca    8.4<L>      25 Jul 2020 05:35  Phos  3.3     07-25  Mg     1.7     07-25    TPro  5.5<L>  /  Alb  2.9<L>  /  TBili  0.5  /  DBili  x   /  AST  13  /  ALT  7   /  AlkPhos  57  07-25

## 2020-07-25 NOTE — PROGRESS NOTE ADULT - SUBJECTIVE AND OBJECTIVE BOX
ICU DOWNGRADE / TRANSFER NOTE    Ventricular tachycardia    HPI:  78 yo M PMH HTN, HLD, CAD, OA, CVA (10 years ago) now wheel chair bound from OA/CVA, admitted June 2020 for SVT that progressed to VT was successfully cardioverted, new onset ischemic cardiomyopathy s/p LHC and stent placement in 6/2020 presents to the ED c/o weakness and episodes of unresponsiveness beginning today.  He reports feeling unwell for the past several days, then at approx noon today he began to feel very weak and reports having several episodes of passing out.  He wears a LifeVest and states the alarm went off several times today, he kept pressing a button to turn it off and then the vest would alarm again several minutes after.  He then decided to call 911, and took the LifeVest off at home    In ED pt went unresponsive in VT was given total 100 of lido and started on amio gtt, BP dropped ton SBP 60's then after maintaining NSR SBPin 80's, pt given gentle bolus of 500cc. Pt had multiple episodes of VT in ED while on amio gtt, pt converted spontaneously twice then was given another 50 of lidocaine, and started on Liodcaine gtt. Pt BP remained normotensive throughout and, pt was asymptomatic.     Multiple calls made out to cards/EPservice pending eval, pads and zol at bedside will likely need device placed in AM. Pt admitted to the ICU for VT/SVT, ischemic cardiomyopathy HFrEF <20% from previous admission,  CARLA pre-renal vs. ATN (16 Jul 2020 23:19)    Hospital Course:  79M with history of stroke, depression, hypertension, and coronary artery disease who was previously admitted for ventricular tachycardia found to have cardiomyopathy and required percutaneous coronary intervention to the left anterior descending artery and was discharged home with an external defibrillator vest. The patient presented with episodes of syncope and alarms from the defibrillator vest found to have ventricular tachycardia. The patient was placed on antiarrhythmic medications and underwent VT ablation. He subsequently had cardiac arrest and required intubation with initiation of vasopressor support and empiric antibiotics. An AICD was implanted and the patient subsequently extubated. Downgrade held on 7/24 due to symptomatic hypotension. He was given IVF. BP still on lower side but he is asymptomatic now.     Interval History:  Patient was seen and examined at bedside. Feeling better.   Denies chest pain, palpitations, shortness of breath, headache, dizziness, visual symptoms, nausea, vomiting or abdominal pain.  Tolerating PO.     ROS:  As per interval history otherwise unremarkable.    PHYSICAL EXAM:  Vital Signs   T(C): 36.7 (25 Jul 2020 11:39), Max: 36.8 (25 Jul 2020 04:15)  T(F): 98.1 (25 Jul 2020 11:39), Max: 98.2 (25 Jul 2020 04:15)  HR: 67 (25 Jul 2020 11:00) (59 - 87)  BP: 91/63 (25 Jul 2020 12:00) (78/48 - 135/70)  BP(mean): 73 (25 Jul 2020 12:00) (58 - 98)  RR: 17 (25 Jul 2020 12:00) (10 - 28)  SpO2: 98% (25 Jul 2020 12:00) (95% - 100%)  General: Elderly male sitting in recliner comfortably. No acute distress  HEENT: PERRLA. EOMI. Clear conjunctivae. Moist mucus membrane  Neck: Supple.   Chest: CTA bilaterally - no wheezing, rales or rhonchi. No chest wall tenderness. ICD in left upper chest.   Heart: Normal S1 & S2. RRR.   Abdomen: Soft. Non-tender. Non-distended. + BS  : Knox's catheter in place. Large left inguinal hernia.   Ext: No pedal edema. No calf tenderness   Neuro: AAO x 3. No focal deficit. No speech disorder  Skin: Warm and Dry  Psychiatry: Normal mood and affect    I&O's Summary    24 Jul 2020 07:01  -  25 Jul 2020 07:00  --------------------------------------------------------  IN: 1995 mL / OUT: 1590 mL / NET: 405 mL    LABS:                        9.4    7.58  )-----------( 89       ( 25 Jul 2020 05:35 )             28.4     07-25    138  |  96<L>  |  19.0  ----------------------------<  106<H>  3.3<L>   |  29.0  |  0.72    Ca    8.4<L>      25 Jul 2020 05:35  Phos  3.3     07-25  Mg     1.7     07-25    TPro  5.5<L>  /  Alb  2.9<L>  /  TBili  0.5  /  DBili  x   /  AST  13  /  ALT  7   /  AlkPhos  57  07-25    RADIOLOGY & ADDITIONAL STUDIES:  Reviewed     MEDICATIONS  (STANDING):  aspirin  chewable 81 milliGRAM(s) Oral daily  atorvastatin 20 milliGRAM(s) Oral at bedtime  buPROPion XL . 150 milliGRAM(s) Oral daily  chlorhexidine 2% Cloths 1 Application(s) Topical <User Schedule>  chlorhexidine 4% Liquid 1 Application(s) Topical <User Schedule>  clopidogrel Tablet 75 milliGRAM(s) Oral daily  enoxaparin Injectable 40 milliGRAM(s) SubCutaneous daily  melatonin 5 milliGRAM(s) Oral at bedtime  midodrine 15 milliGRAM(s) Oral every 8 hours  nystatin Cream 1 Application(s) Topical two times a day  piperacillin/tazobactam IVPB.. 3.375 Gram(s) IV Intermittent every 8 hours  saccharomyces boulardii 250 milliGRAM(s) Oral two times a day  senna 2 Tablet(s) Oral at bedtime  tamsulosin 0.4 milliGRAM(s) Oral at bedtime    MEDICATIONS  (PRN):  acetaminophen   Tablet .. 650 milliGRAM(s) Oral every 6 hours PRN Temp greater or equal to 38C (100.4F), Moderate Pain (4 - 6) ICU DOWNGRADE / TRANSFER NOTE    Ventricular tachycardia    HPI:  80 yo M PMH HTN, HLD, CAD, OA, CVA (10 years ago) now wheel chair bound from OA/CVA, admitted June 2020 for SVT that progressed to VT was successfully cardioverted, new onset ischemic cardiomyopathy s/p LHC and stent placement in 6/2020 presents to the ED c/o weakness and episodes of unresponsiveness beginning today.  He reports feeling unwell for the past several days, then at approx noon today he began to feel very weak and reports having several episodes of passing out.  He wears a LifeVest and states the alarm went off several times today, he kept pressing a button to turn it off and then the vest would alarm again several minutes after.  He then decided to call 911, and took the LifeVest off at home    In ED pt went unresponsive in VT was given total 100 of lido and started on amio gtt, BP dropped ton SBP 60's then after maintaining NSR SBPin 80's, pt given gentle bolus of 500cc. Pt had multiple episodes of VT in ED while on amio gtt, pt converted spontaneously twice then was given another 50 of lidocaine, and started on Liodcaine gtt. Pt BP remained normotensive throughout and, pt was asymptomatic.     Multiple calls made out to cards/EPservice pending eval, pads and zol at bedside will likely need device placed in AM. Pt admitted to the ICU for VT/SVT, ischemic cardiomyopathy HFrEF <20% from previous admission,  CARLA pre-renal vs. ATN (16 Jul 2020 23:19)    Hospital Course:  79M with history of stroke, depression, hypertension, and coronary artery disease who was previously admitted for ventricular tachycardia found to have cardiomyopathy and required percutaneous coronary intervention to the left anterior descending artery and was discharged home with an external defibrillator vest. The patient presented with episodes of syncope and alarms from the defibrillator vest found to have ventricular tachycardia. The patient was placed on antiarrhythmic medications and underwent VT ablation. He subsequently had cardiac arrest and required intubation with initiation of vasopressor support and empiric antibiotics. An AICD was implanted and the patient subsequently extubated.     Interval History:  Patient was seen and examined at bedside. Feeling better.   Denies chest pain, palpitations, shortness of breath, headache, dizziness, visual symptoms, nausea, vomiting or abdominal pain.  Tolerating PO.     ROS:  As per interval history otherwise unremarkable.    PHYSICAL EXAM:  Vital Signs   T(C): 36.7 (25 Jul 2020 11:39), Max: 36.8 (25 Jul 2020 04:15)  T(F): 98.1 (25 Jul 2020 11:39), Max: 98.2 (25 Jul 2020 04:15)  HR: 67 (25 Jul 2020 11:00) (59 - 87)  BP: 91/63 (25 Jul 2020 12:00) (78/48 - 135/70)  BP(mean): 73 (25 Jul 2020 12:00) (58 - 98)  RR: 17 (25 Jul 2020 12:00) (10 - 28)  SpO2: 98% (25 Jul 2020 12:00) (95% - 100%)  General: Elderly male sitting in recliner comfortably. No acute distress  HEENT: PERRLA. EOMI. Clear conjunctivae. Moist mucus membrane  Neck: Supple.   Chest: CTA bilaterally - no wheezing, rales or rhonchi. No chest wall tenderness. ICD in left upper chest.   Heart: Normal S1 & S2. RRR.   Abdomen: Soft. Non-tender. Non-distended. + BS  : Knox's catheter in place. Large left inguinal hernia.   Ext: No pedal edema. No calf tenderness   Neuro: AAO x 3. No focal deficit. No speech disorder  Skin: Warm and Dry  Psychiatry: Normal mood and affect    I&O's Summary    24 Jul 2020 07:01  -  25 Jul 2020 07:00  --------------------------------------------------------  IN: 1995 mL / OUT: 1590 mL / NET: 405 mL    LABS:                        9.4    7.58  )-----------( 89       ( 25 Jul 2020 05:35 )             28.4     07-25    138  |  96<L>  |  19.0  ----------------------------<  106<H>  3.3<L>   |  29.0  |  0.72    Ca    8.4<L>      25 Jul 2020 05:35  Phos  3.3     07-25  Mg     1.7     07-25    TPro  5.5<L>  /  Alb  2.9<L>  /  TBili  0.5  /  DBili  x   /  AST  13  /  ALT  7   /  AlkPhos  57  07-25    RADIOLOGY & ADDITIONAL STUDIES:  Reviewed     MEDICATIONS  (STANDING):  aspirin  chewable 81 milliGRAM(s) Oral daily  atorvastatin 20 milliGRAM(s) Oral at bedtime  buPROPion XL . 150 milliGRAM(s) Oral daily  chlorhexidine 2% Cloths 1 Application(s) Topical <User Schedule>  chlorhexidine 4% Liquid 1 Application(s) Topical <User Schedule>  clopidogrel Tablet 75 milliGRAM(s) Oral daily  enoxaparin Injectable 40 milliGRAM(s) SubCutaneous daily  melatonin 5 milliGRAM(s) Oral at bedtime  midodrine 15 milliGRAM(s) Oral every 8 hours  nystatin Cream 1 Application(s) Topical two times a day  piperacillin/tazobactam IVPB.. 3.375 Gram(s) IV Intermittent every 8 hours  saccharomyces boulardii 250 milliGRAM(s) Oral two times a day  senna 2 Tablet(s) Oral at bedtime  tamsulosin 0.4 milliGRAM(s) Oral at bedtime    MEDICATIONS  (PRN):  acetaminophen   Tablet .. 650 milliGRAM(s) Oral every 6 hours PRN Temp greater or equal to 38C (100.4F), Moderate Pain (4 - 6)

## 2020-07-25 NOTE — PROGRESS NOTE ADULT - ASSESSMENT
80 yo M PMH HTN, HLD, CAD, OA, CVA (10 years ago) now wheel chair bound from OA/CVA, admitted June 2020 for SVT that progressed to VT was successfully cardioverted, new onset ischemic cardiomyopathy s/p LHC and stent placement in 6/2020 presents to the ED c/o weakness and episodes of unresponsiveness beginning today.  He reports feeling unwell for the past several days, then at approx noon today he began to feel very weak and reports having several episodes of passing out.  He wears a LifeVest and states the alarm went off several times today, he kept pressing a button to turn it off and then the vest would alarm again several minutes after.  He then decided to call 911, and took the LifeVest off at home  In ED pt went unresponsive in VT was given total 100 of lido and started on amio gtt, BP dropped ton SBP 60's then after maintaining NSR SBPin 80's, pt given gentle bolus of 500cc. Pt had multiple episodes of VT in ED while on amio gtt, pt converted spontaneously twice then was given another 50 of lidocaine, and started on Liodcaine gtt. Pt BP remained normotensive throughout and, pt was asymptomatic.   Multiple calls made out to cards/EPservice pending eval, pads and zol at bedside will likely need device placed in AM. Pt admitted to the ICU for VT/SVT, ischemic cardiomyopathy HFrEF <20% from previous admission,  CARLA pre-renal vs. ATN   PT WITH  EPISODE OF  ASYSTOLE   CPR DONE  INTUBATION THEN HAD V/P ABLATION AND IMPLANTATION ICD  PT HAS BEEN ON IV ABX FOR POSSIBLE ASPIRATION  PNEUMONIA   HAD EPISODE OF HYPOTENSION  YESTERDAY   PT CURRENTLY AWAKE AND ALERT OOB TO CHAIR    BLOOD CX X2 SETS  SO FAR NEG    SPUTUM CX WITH STAPH  AUREUS MSSA  CXR NEG  PT HAS COMPLETED 5 DYAS IV ZOSYN WOULD   CONSIDER D/C AS NO OBVIOUS PNEUMONIA  WILL FOLLOW UP

## 2020-07-25 NOTE — CHART NOTE - NSCHARTNOTEFT_GEN_A_CORE
Source: Patient [ ]  Family [ ]   other [x ]EMR    [facility] with thin liquids    PO intake:  < 50% [ ]   50-75%  [ x]   %  [x ]  other :    Source for PO intake [ ] Patient [ ] family [x ] chart [ ] staff [ ] other    Enteral /Parenteral Nutrition:     Current Weight: 7/17 218.2#, 7/20 226#, 7/22 228#, 7/24 221.5#  generalized 1 +edema    % Weight Change     Pertinent Medications: MEDICATIONS  (STANDING):  aspirin  chewable 81 milliGRAM(s) Oral daily  atorvastatin 20 milliGRAM(s) Oral at bedtime  buPROPion XL . 150 milliGRAM(s) Oral daily  chlorhexidine 2% Cloths 1 Application(s) Topical <User Schedule>  chlorhexidine 4% Liquid 1 Application(s) Topical <User Schedule>  clopidogrel Tablet 75 milliGRAM(s) Oral daily  lactated ringers. 1000 milliLiter(s) (60 mL/Hr) IV Continuous <Continuous>  melatonin 5 milliGRAM(s) Oral at bedtime  midodrine 15 milliGRAM(s) Oral every 8 hours  nystatin Cream 1 Application(s) Topical two times a day  piperacillin/tazobactam IVPB.. 3.375 Gram(s) IV Intermittent every 8 hours  potassium chloride  10 mEq/100 mL IVPB 10 milliEquivalent(s) IV Intermittent every 1 hour  saccharomyces boulardii 250 milliGRAM(s) Oral two times a day  senna 2 Tablet(s) Oral at bedtime  tamsulosin 0.4 milliGRAM(s) Oral at bedtime    MEDICATIONS  (PRN):  acetaminophen   Tablet .. 650 milliGRAM(s) Oral every 6 hours PRN Temp greater or equal to 38C (100.4F), Moderate Pain (4 - 6)    Pertinent Labs: CBC Full  -  ( 25 Jul 2020 05:35 )  WBC Count : 7.58 K/uL  RBC Count : 2.94 M/uL  Hemoglobin : 9.4 g/dL  Hematocrit : 28.4 %  Platelet Count - Automated : 89 K/uL  Mean Cell Volume : 96.6 fl  Mean Cell Hemoglobin : 32.0 pg  Mean Cell Hemoglobin Concentration : 33.1 gm/dL  Auto Neutrophil # : x  Auto Lymphocyte # : x  Auto Monocyte # : x  Auto Eosinophil # : x  Auto Basophil # : x  Auto Neutrophil % : x  Auto Lymphocyte % : x  Auto Monocyte % : x  Auto Eosinophil % : x  Auto Basophil % : x      07-25 Na138 mmol/L Glu 106 mg/dL<H> K+ 3.3 mmol/L<L> Cr  0.72 mg/dL BUN 19.0 mg/dL Phos 3.3 mg/dL Alb 2.9 g/dL<L> PAB n/a           Skin:  R malleolus stage II, IAD        Nutrition focused physical exam previously conducted - found signs of malnutrition [ ]absent [ x]present    Subcutaneous fat loss: [ x] Orbital fat pads region, [ ]Buccal fat region, [ ]Triceps region,  [ ]Ribs region    Muscle wasting: [x ]Temples region, [x ]Clavicle region, [x ]Shoulder region, [ ]Scapula region, [ ]Interosseous region,  [ ]thigh region, [ ]Calf region    Estimated Needs:   [x] no change since previous assessment  [ ] recalculated:     Current Nutrition Diagnosis: Pt remains at high nutrition risk and meets criteria for moderate, chronic malnutrition related to inability to meet sufficient protein-energy in setting of depression, CHF, and skin breakdown as evidenced by moderate muscle/fat loss and likely meeting <75% nutrient needs >1 mo. Pt now with improving po intake % of meals per documentation. 7/24 last bowel movement fecal incontinence noted.     Recommendations:   1) Add Ensure Enlive TID to optimize po intake and provide an additional 350kcal, 20g protein per serving   2) RX: MVI, Vitamin C (500mg) daily  3) Monitor wts and labs        Monitoring and Evaluation:   [xx ] PO intake [x ] Tolerance to diet prescription [X] Weights  [X] Follow up per protocol [X] Labs:

## 2020-07-25 NOTE — PROGRESS NOTE ADULT - ASSESSMENT
Patient is a 79 year old male with a pmhx of HTN, HLD, CAD, OA, CVA (10 years ago) now wheel chair bound from OA/CVA, VTach s/p ablation,  CHB s/p AICD/PPM placement, s/p cardiac arrest with successful ROSC, s/p respiratory failure now extubated

## 2020-07-25 NOTE — PROGRESS NOTE ADULT - SUBJECTIVE AND OBJECTIVE BOX
80 yo M PMH HTN, HLD, CAD, OA, CVA (10 years ago) now wheel chair bound from OA/CVA, admitted June 2020 for SVT that progressed to VT was successfully cardioverted, new onset ischemic cardiomyopathy s/p LHC and stent placement in 6/2020 presents to the ED c/o weakness and episodes of unresponsiveness.  Pt admitted to the ICU for VT/SVT, ischemic cardiomyopathy HFrEF <20% from previous admission,  CARLA pre-renal vs. ATN  He underwent EPS and RF ablation of bundle branch re-entry VT on 7/20/2020 and TVP via RIJ.  That night patient, had TVP appeared to be capturing inappropriately on VVI w/rate of 30, intrinsic rate 50-60's.  TVP was turned off for fear of R on T precipitating an arrhythmia.  Pt subsequently underwent 2x 5-6 second pauses followed by asystolic arrest.  ACLS initiated, pt intubated, TVP turned back on and 1 epi given with ROSC. downtime ~3mins. Pt returned to EP lab 7/21/2020 for MDT CRT-D implant via left cephalic access.  Successfully extubated overnight 7/22/2020.  Noted to have new onset atrial flutter on 7/22/2020 AM.  Bedside atrial ATP through device performed by Dr Wright, with successful conversion to sinus rhythm however, pt now with recurrent atrial flutter. He was then transferred 7/24 to the medical floor for further management but yesterday morning patient had acute drop in BP, SBPs in low 60s, became altered and given stat bolus. Of note patient received 40mg lasix yesterday and had 2.3L of urine output yesterday. Re-admitted to ICU.   Today pt is feeling better, BP has normalized.  He has been OOB to chair.  He was downgraded from ICU and Dr Jameson gave report to Dr Miller.  Patient is a 79y old  Male who presents with a chief complaint of CHF MI (25 Jul 2020 08:54)      BRIEF HOSPITAL COURSE: ***    Events last 24 hours: ***    PAST MEDICAL & SURGICAL HISTORY:  Stroke  Osteoarthritis  Hyperlipidemia  Depression  HTN (hypertension)  S/P stroke due to cerebrovascular disease      Review of Systems:  CONSTITUTIONAL: No fever, chills, or fatigue  EYES: No eye pain, visual disturbances, or discharge  ENMT:  No difficulty hearing, tinnitus, vertigo; No sinus or throat pain  NECK: No pain or stiffness  RESPIRATORY: No cough, wheezing, chills or hemoptysis; No shortness of breath  CARDIOVASCULAR: No chest pain, palpitations, dizziness, or leg swelling  GASTROINTESTINAL: No abdominal or epigastric pain. No nausea, vomiting, or hematemesis; No diarrhea or constipation. No melena or hematochezia.  GENITOURINARY: No dysuria, frequency, hematuria, or incontinence  NEUROLOGICAL: No headaches, memory loss, loss of strength, numbness, or tremors  SKIN: No itching, burning, rashes, or lesions   MUSCULOSKELETAL: No joint pain or swelling; No muscle, back, or extremity pain  PSYCHIATRIC: No depression, anxiety, mood swings, or difficulty sleeping      Medications:  piperacillin/tazobactam IVPB.. 3.375 Gram(s) IV Intermittent every 8 hours  midodrine 15 milliGRAM(s) Oral every 8 hours  tamsulosin 0.4 milliGRAM(s) Oral at bedtime  acetaminophen   Tablet .. 650 milliGRAM(s) Oral every 6 hours PRN  buPROPion XL . 150 milliGRAM(s) Oral daily  melatonin 5 milliGRAM(s) Oral at bedtime  aspirin  chewable 81 milliGRAM(s) Oral daily  clopidogrel Tablet 75 milliGRAM(s) Oral daily  enoxaparin Injectable 40 milliGRAM(s) SubCutaneous daily  senna 2 Tablet(s) Oral at bedtime  atorvastatin 20 milliGRAM(s) Oral at bedtime  chlorhexidine 2% Cloths 1 Application(s) Topical <User Schedule>  chlorhexidine 4% Liquid 1 Application(s) Topical <User Schedule>  nysatin Cream 1 Application(s) Topical two times a day  saccaomyces boulardii 250 milliGRAM(s) Oral two times a day    ICU Vital Signs Last 24 Hrs  T(C): 36.7 (25 Jul 2020 11:39), Max: 36.8 (25 Jul 2020 04:15)  T(F): 98.1 (25 Jul 2020 11:39), Max: 98.2 (25 Jul 2020 04:15)  HR: 66 (25 Jul 2020 13:00) (59 - 87)  BP: 95/49 (25 Jul 2020 13:00) (78/48 - 135/70)  BP(mean): 69 (25 Jul 2020 13:00) (58 - 98)  ABP: --  ABP(mean): --  RR: 16 (25 Jul 2020 13:00) (10 - 28)  SpO2: 97% (25 Jul 2020 13:00) (95% - 100%)    LABS:                        9.4    7.58  )-----------( 89       ( 25 Jul 2020 05:35 )             28.4     07-25    138  |  96<L>  |  19.0  ----------------------------<  106<H>  3.3<L>   |  29.0  |  0.72    Ca    8.4<L>      25 Jul 2020 05:35  Phos  3.3     07-25  Mg     1.7     07-25    TPro  5.5<L>  /  Alb  2.9<L>  /  TBili  0.5  /  DBili  x   /  AST  13  /  ALT  7   /  AlkPhos  57  07-25    CAPILLARY BLOOD GLUCOSE  CULTURES:  Culture Results:   Moderate Staphylococcus aureus  Normal Respiratory Rosana absent (07-22 @ 06:41)      Physical Examination:    General: No acute distress.  Alert, oriented, interactive, nonfocal    HEENT: Pupils equal, reactive to light.  Symmetric.    PULM: Diminished at bases bilaterally, no significant sputum production    CVS: Paced, no murmurs, rubs, or gallops    ABD: Soft, nondistended, nontender, normoactive bowel sounds, no masses    EXT: b/l LE edema, nontender    SKIN: Warm and well perfused, no rashes noted.    RADIOLOGY: images reviewed    CRITICAL CARE TIME SPENT: ***

## 2020-07-25 NOTE — PROGRESS NOTE ADULT - SUBJECTIVE AND OBJECTIVE BOX
[place] [facility] - [facility]                                   [address] [place] [address]                                                      PHONE: [phone]                                                         FAX: [phone]  [email or web address]  ---------------------------------------------------------------------------------------------------------------------------------    Overnight events/patient complaints:  NAD feeling well today BP stable     No Known Allergies    MEDICATIONS  (STANDING):  aspirin  chewable 81 milliGRAM(s) Oral daily  atorvastatin 20 milliGRAM(s) Oral at bedtime  buPROPion XL . 150 milliGRAM(s) Oral daily  chlorhexidine 2% Cloths 1 Application(s) Topical <User Schedule>  chlorhexidine 4% Liquid 1 Application(s) Topical <User Schedule>  clopidogrel Tablet 75 milliGRAM(s) Oral daily  lactated ringers. 1000 milliLiter(s) (60 mL/Hr) IV Continuous <Continuous>  melatonin 5 milliGRAM(s) Oral at bedtime  midodrine 15 milliGRAM(s) Oral every 8 hours  nystatin Cream 1 Application(s) Topical two times a day  piperacillin/tazobactam IVPB.. 3.375 Gram(s) IV Intermittent every 8 hours  potassium chloride  10 mEq/100 mL IVPB 10 milliEquivalent(s) IV Intermittent every 1 hour  saccharomyces boulardii 250 milliGRAM(s) Oral two times a day  senna 2 Tablet(s) Oral at bedtime  tamsulosin 0.4 milliGRAM(s) Oral at bedtime    MEDICATIONS  (PRN):  acetaminophen   Tablet .. 650 milliGRAM(s) Oral every 6 hours PRN Temp greater or equal to 38C (100.4F), Moderate Pain (4 - 6)      Vital Signs Last 24 Hrs  T(C): 36.5 (25 Jul 2020 07:37), Max: 36.8 (25 Jul 2020 04:15)  T(F): 97.7 (25 Jul 2020 07:37), Max: 98.2 (25 Jul 2020 04:15)  HR: 64 (25 Jul 2020 08:00) (59 - 92)  BP: 86/54 (25 Jul 2020 08:00) (56/41 - 135/70)  BP(mean): 65 (25 Jul 2020 08:00) (46 - 98)  RR: 19 (25 Jul 2020 08:00) (10 - 28)  SpO2: 96% (25 Jul 2020 08:00) (90% - 100%)  ICU Vital Signs Last 24 Hrs  SASKIA ELIAS  I&O's Detail    24 Jul 2020 07:01  -  25 Jul 2020 07:00  --------------------------------------------------------  IN:    lactated ringers.: 720 mL    multiple electrolytes Injection Type 1 Bolus: 1000 mL    Solution: 50 mL    Solution: 225 mL  Total IN: 1995 mL    OUT:    Indwelling Catheter - Urethral: 1590 mL  Total OUT: 1590 mL    Total NET: 405 mL        I&O's Summary    24 Jul 2020 07:01  -  25 Jul 2020 07:00  --------------------------------------------------------  IN: 1995 mL / OUT: 1590 mL / NET: 405 mL      Drug Dosing Weight  SASKIA ELIAS      PHYSICAL EXAM:  General: Appears well developed, well nourished alert and cooperative.  HEENT: Head; normocephalic, atraumatic.  Eyes: Pupils reactive, cornea wnl.  Neck: Supple, no nodes adenopathy, no NVD or carotid bruit or thyromegaly.  CARDIOVASCULAR: Normal S1 and S2, 2/6 murmur, rub, gallop or lift.   LUNGS: No rales, rhonchi or wheeze. Normal breath sounds bilaterally.  ABDOMEN: Soft, nontender without mass or organomegaly. bowel sounds normoactive.  EXTREMITIES: No clubbing, cyanosis or edema. Distal pulses wnl.   SKIN: warm and dry with normal turgor.  NEURO: Alert/oriented x 3/normal motor exam. No pathologic reflexes.    PSYCH: normal affect.        LABS:                        9.4    7.58  )-----------( 89       ( 25 Jul 2020 05:35 )             28.4     07-25    138  |  96<L>  |  19.0  ----------------------------<  106<H>  3.3<L>   |  29.0  |  0.72    Ca    8.4<L>      25 Jul 2020 05:35  Phos  3.3     07-25  Mg     1.7     07-25    TPro  5.5<L>  /  Alb  2.9<L>  /  TBili  0.5  /  DBili  x   /  AST  13  /  ALT  7   /  AlkPhos  57  07-25    [facility]            RADIOLOGY & ADDITIONAL STUDIES:    INTERPRETATION OF TELEMETRY (personally reviewed):        Summary:   1. Endocardial visualization was enhanced withintravenous echo contrast.   2. Mildly enlarged left atrium.   3. Left ventricular ejection fraction, by visual estimation, is <20%.   4. The right atrium is normal in size.   5. The right ventricular size is mildly enlarged. Moderately reduced RV systolic function.   6. No significant valvular abnomality.   7. There is no evidence of pericardial effusion.   8. Dilatation of the aortic root and sinuses of Valsalva.   9. Comapred to study from 6/2020, there is no significant change in LV function.    MD Rona, RPVI Electronically signed on 7/17/2020 at 4:05:19 PM         CARDIAC CATHETERIZATION:  HEMODYNAMICS: Hemodynamic assessment demonstrates mildly elevated LVEDP.  CORONARY VESSELS: The coronary circulation is left dominant.  LM:   --  Mid left main: There was a 30 % stenosis.  --  Distal left main: Angiography showed minor luminal irregularities with  no flow limiting lesions.  LAD:   --  Proximal LAD: There was a 0 % stenosis at the site of a prior  stent.  --  Mid LAD: There was a 70 % stenosis.  CX:   --  Proximal circumflex: Angiography showed minor luminal  irregularities with no flow limiting lesions.  --  Mid circumflex: Angiography showed minor luminal irregularities with no  flow limiting lesions.  --  Distal circumflex: Angiography showed minor luminal irregularities with  no flow limiting lesions.  --  L AV groove: Angiography showed minor luminal irregularities with no  flow limiting lesions.  RCA:   --  Proximal RCA: Angiography showed minor luminal irregularities  with no flow limiting lesions.  --  Mid RCA: There was a 40 % stenosis.  COMPLICATIONS: There were no complications. No complications occurred  during the cath lab visit.  SUMMARY:  HEMODYNAMICS: Hemodynamic assessment demonstrates mildly elevated LVEDP.  DIAGNOSTIC IMPRESSIONS: Patent ostial LAD stent.  IVUS and iFR of LAD shoed 70% mid LAD stenosis with positive iFR s/p IVUS  assisted PTCA and 3.0 x 34 BENSON postdilated proximally to 4.5mm.  DIAGNOSTIC RECOMMENDATIONS: Medical therapy for CAD  ICD evaluation for sustained VT.  INTERVENTIONAL IMPRESSIONS: Patent ostialLAD stent.  IVUS and iFR of LAD shoed 70% mid LAD stenosis with positive iFR s/p IVUS  assisted PTCA and 3.0 x 34 BENSON postdilated proximally to 4.5mm.  INTERVENTIONAL RECOMMENDATIONS: Medical therapy for CAD  ICD evaluation for sustained VT.  Prepared and signed by  Roger Robert MD      ASSESSMENT AND PLAN:  In summary, SASKIA ELIAS is an 79y Male with past medical history significant for HTN, HLD, CVA, CAD/MI s/p BENSON to LAD presents with recurrent VT s/p ablation and course complicated by asystolic arrest/TVP and subsequent BIV-ICD; Hypotension improved with NS 500cc; Lasix 40 mg yesterday 2.3 out patient; limited ECHO TDS no clear evidence of effusion pericardial; Prox Aflutter not on long term AC due to bleeding risk; CT ABD negative for hematoma and repeat TTE limited negative for effusion     Plan  1.  Continue DAPT and monitor H/H  2.  Restart optimal medical therapy if BP stable    3.  ID and Hematology follow up

## 2020-07-26 NOTE — PROGRESS NOTE ADULT - SUBJECTIVE AND OBJECTIVE BOX
INFECTIOUS DISEASES AND INTERNAL MEDICINE at [place]  =======================================================  Andrey Johnson MD  Diplomates American Board of Internal Medicine and Infectious Diseases  Telephone [phone]  Fax            [phone]  =======================================================    JADASASKIA EMANUEL [id number]    Follow up: PNUEMONIA    Allergies:  No Known Allergies      Medications:  acetaminophen   Tablet .. 650 milliGRAM(s) Oral every 6 hours PRN  aspirin  chewable 81 milliGRAM(s) Oral daily  atorvastatin 20 milliGRAM(s) Oral at bedtime  buPROPion XL . 150 milliGRAM(s) Oral daily  chlorhexidine 2% Cloths 1 Application(s) Topical <User Schedule>  chlorhexidine 4% Liquid 1 Application(s) Topical <User Schedule>  clopidogrel Tablet 75 milliGRAM(s) Oral daily  enoxaparin Injectable 40 milliGRAM(s) SubCutaneous daily  melatonin 5 milliGRAM(s) Oral at bedtime  midodrine 15 milliGRAM(s) Oral every 8 hours  nystatin Cream 1 Application(s) Topical two times a day  piperacillin/tazobactam IVPB.. 3.375 Gram(s) IV Intermittent every 8 hours  saccharomyces boulardii 250 milliGRAM(s) Oral two times a day  senna 2 Tablet(s) Oral at bedtime  tamsulosin 0.4 milliGRAM(s) Oral at bedtime    SOCIAL       FAMILY   FAMILY HISTORY:  Family history unknown    REVIEW OF SYSTEMS:  CONSTITUTIONAL:  No Fever or chills  HEENT:   No diplopia or blurred vision.  No earache, sore throat or runny nose.  CARDIOVASCULAR:  No pressure, squeezing, strangling, tightness, heaviness or aching about the chest, neck, axilla or epigastrium.  RESPIRATORY:   cough, shortness of breath,    GASTROINTESTINAL:  No nausea, vomiting or diarrhea.  GENITOURINARY:  No dysuria, frequency or urgency. No Blood in urine  MUSCULOSKELETAL:   AS PER HPI  SKIN:  No change in skin, hair or nails.  NEUROLOGIC:  No paresthesias, fasciculations, seizures or weakness.            Physical Exam:   Vital Signs Last 24 Hrs  T(C): 36.6 (26 Jul 2020 12:04), Max: 37.1 (26 Jul 2020 04:14)  T(F): 97.8 (26 Jul 2020 12:04), Max: 98.7 (26 Jul 2020 04:14)  HR: 72 (26 Jul 2020 09:00) (61 - 73)  BP: 121/58 (26 Jul 2020 09:00) (86/54 - 123/60)  BP(mean): 77 (26 Jul 2020 09:00) (66 - 93)  RR: 18 (26 Jul 2020 09:00) (15 - 32)  SpO2: 95% (26 Jul 2020 09:00) (91% - 98%)  GEN: NAD,  OOB TO CHAIR   HEENT: normocephalic and atraumatic. EOMI. TONNY.    NECK: Supple. No carotid bruits.  No lymphadenopathy or thyromegaly.  LUNGS: Clear to auscultation.  HEART: Regular rate and rhythm without murmur.  ABDOMEN: Soft, nontender, and nondistended.  Positive bowel sounds.    : No CVA tenderness  EXTREMITIES: Without any cyanosis, clubbing, rash, lesions or edema.  MSK: no joint swelling  NEUROLOGIC: Cranial nerves II through XII are grossly intact.  PSYCHIATRIC: Appropriate affect .  SKIN: No ulceration or induration present.        Labs:                                    9.4    7.58  )-----------( 89       ( 25 Jul 2020 05:35 )             28.4   07-25    138  |  96<L>  |  19.0  ----------------------------<  106<H>  3.3<L>   |  29.0  |  0.72    Ca    8.4<L>      25 Jul 2020 05:35  Phos  3.3     07-25  Mg     1.7     07-25    TPro  5.5<L>  /  Alb  2.9<L>  /  TBili  0.5  /  DBili  x   /  AST  13  /  ALT  7   /  AlkPhos  57  07-25

## 2020-07-26 NOTE — PROGRESS NOTE ADULT - ASSESSMENT
79 years old male with history of stroke, depression, hypertension, and coronary artery disease who was previously admitted for ventricular tachycardia found to have cardiomyopathy and required percutaneous coronary intervention to the left anterior descending artery and was discharged home with an external defibrillator vest. The patient presented with episodes of syncope and alarms from the defibrillator vest found to have ventricular tachycardia. The patient was placed on antiarrhythmic medications and underwent VT ablation. He subsequently had cardiac arrest and required intubation with initiation of vasopressor support and empiric antibiotics. An AICD was implanted and the patient subsequently extubated.    1) Ventricular tachycardia / Cardiac arrest   - Status post VT ablation and AICD implantation  - Cardiology and Electrophysiology consultation noted  - Metoprolol and Lisinopril on hold due to hypotension  - Levophed weaned off and now on Midodrine (will decrease to 10 mg q 8 hours in am if BP remains stable)     2) Acute Respiratory Failure / Suspected Aspiration  - s/p extubation  - Finished Zosyn   - ID follow up noted      3) CAD   - s/p PCI  - Continue Aspirin, Plavix and Lipitor  - Lisinopril and Metoprolol on hold due to hypotension    4) New Onset Atrial Flutter  - Rate controlled  - Continue Aspirin   - AC once thought to be feasible by Electrophysiology    5) Thrombocytopenia  - HIT ruled out  - No signs of bleeding  - Monitor     6) CARLA   - Resolved  - Monitor renal function    7) Urinary Retention  - Failed voiding trials  - Continue Knox's Catheter   - Continue Flomax     8) Left Inguinal Hernia  - Outpatient follow up     9) Depression   - Continue Bupropion     10) Hypokalemia / Hypomagnesemia   - Replaced    11) Dysphagia  - Speech therapy recommending Mechanical Soft with Thin Liquids however patient wants to take Puree instead of Mechanical Soft.     DVT Prophylaxis -- Lovenox 40 mg SQ    Dispo: Likely ANT in 1-2 days

## 2020-07-26 NOTE — PROGRESS NOTE ADULT - ASSESSMENT
80 yo M PMH HTN, HLD, CAD, OA, CVA (10 years ago) now wheel chair bound from OA/CVA, admitted June 2020 for SVT that progressed to VT was successfully cardioverted, new onset ischemic cardiomyopathy s/p LHC and stent placement in 6/2020 presents to the ED c/o weakness and episodes of unresponsiveness beginning today.  He reports feeling unwell for the past several days, then at approx noon today he began to feel very weak and reports having several episodes of passing out.  He wears a LifeVest and states the alarm went off several times today, he kept pressing a button to turn it off and then the vest would alarm again several minutes after.  He then decided to call 911, and took the LifeVest off at home  In ED pt went unresponsive in VT was given total 100 of lido and started on amio gtt, BP dropped ton SBP 60's then after maintaining NSR SBPin 80's, pt given gentle bolus of 500cc. Pt had multiple episodes of VT in ED while on amio gtt, pt converted spontaneously twice then was given another 50 of lidocaine, and started on Liodcaine gtt. Pt BP remained normotensive throughout and, pt was asymptomatic.   Multiple calls made out to cards/EPservice pending eval, pads and zol at bedside will likely need device placed in AM. Pt admitted to the ICU for VT/SVT, ischemic cardiomyopathy HFrEF <20% from previous admission,  CARLA pre-renal vs. ATN   PT WITH  EPISODE OF  ASYSTOLE   CPR DONE  INTUBATION THEN HAD V/P ABLATION AND IMPLANTATION ICD  PT HAS BEEN ON IV ABX FOR POSSIBLE ASPIRATION  PNEUMONIA   HAD EPISODE OF HYPOTENSION  YESTERDAY   PT CURRENTLY AWAKE AND ALERT    BLOOD CX X2      FAR NEG    SPUTUM CX WITH STAPH  AUREUS MSSA  CXR NEG  PT HAD  COMPLETED 5 Days  IV ZOSYN   OBSERVE OFF ABX WILL  FOLLOW UP AS NEEDED PLEASE CALL IF QUESTIONS

## 2020-07-26 NOTE — PROGRESS NOTE ADULT - SUBJECTIVE AND OBJECTIVE BOX
Ventricular tachycardia    HPI:  78 yo M PMH HTN, HLD, CAD, OA, CVA (10 years ago) now wheel chair bound from OA/CVA, admitted June 2020 for SVT that progressed to VT was successfully cardioverted, new onset ischemic cardiomyopathy s/p LHC and stent placement in 6/2020 presents to the ED c/o weakness and episodes of unresponsiveness beginning today.  He reports feeling unwell for the past several days, then at approx noon today he began to feel very weak and reports having several episodes of passing out.  He wears a LifeVest and states the alarm went off several times today, he kept pressing a button to turn it off and then the vest would alarm again several minutes after.  He then decided to call 911, and took the LifeVest off at home    In ED pt went unresponsive in VT was given total 100 of lido and started on amio gtt, BP dropped ton SBP 60's then after maintaining NSR SBPin 80's, pt given gentle bolus of 500cc. Pt had multiple episodes of VT in ED while on amio gtt, pt converted spontaneously twice then was given another 50 of lidocaine, and started on Liodcaine gtt. Pt BP remained normotensive throughout and, pt was asymptomatic.     Multiple calls made out to cards/EPservice pending eval, pads and zol at bedside will likely need device placed in AM. Pt admitted to the ICU for VT/SVT, ischemic cardiomyopathy HFrEF <20% from previous admission,  CARLA pre-renal vs. ATN (16 Jul 2020 23:19)    Interval History:  Patient was seen and examined at bedside.   Doing well.  Was complaining of right knee pain due to arthritis so Tramadol was given overnight.   Denies chest pain, palpitations, shortness of breath, headache, dizziness, visual symptoms, nausea, vomiting or abdominal pain.  Tolerating PO, wants to eat Puree instead of mechanical soft.     ROS:  As per interval history otherwise unremarkable.    PHYSICAL EXAM:  Vital Signs   T(C): 36.6 (26 Jul 2020 08:04), Max: 37.1 (26 Jul 2020 04:14)  T(F): 97.9 (26 Jul 2020 08:04), Max: 98.7 (26 Jul 2020 04:14)  HR: 72 (26 Jul 2020 09:00) (61 - 73)  BP: 121/58 (26 Jul 2020 09:00) (86/54 - 123/60)  BP(mean): 77 (26 Jul 2020 09:00) (66 - 93)  RR: 18 (26 Jul 2020 09:00) (15 - 32)  SpO2: 95% (26 Jul 2020 09:00) (91% - 98%)  General: Elderly male sitting in bed comfortably, reading book. No acute distress  HEENT: PERRLA. EOMI. Clear conjunctivae. Moist mucus membrane  Neck: Supple.   Chest: CTA bilaterally - no wheezing, rales or rhonchi. No chest wall tenderness. ICD in left upper chest.   Heart: Normal S1 & S2. RRR.   Abdomen: Soft. Non-tender. Non-distended. + BS  : Knox's catheter in place. Large left inguinal hernia.   Ext: No pedal edema. No calf tenderness   Neuro: AAO x 3. No focal deficit. No speech disorder  Skin: Warm and Dry  Psychiatry: Normal mood and affect    I&O's Summary    25 Jul 2020 07:01  -  26 Jul 2020 07:00  --------------------------------------------------------  IN: 305 mL / OUT: 555 mL / NET: -250 mL    LABS:                        9.4    7.58  )-----------( 89       ( 25 Jul 2020 05:35 )             28.4     07-25    138  |  96<L>  |  19.0  ----------------------------<  106<H>  3.3<L>   |  29.0  |  0.72    Ca    8.4<L>      25 Jul 2020 05:35  Phos  3.3     07-25  Mg     1.7     07-25    TPro  5.5<L>  /  Alb  2.9<L>  /  TBili  0.5  /  DBili  x   /  AST  13  /  ALT  7   /  AlkPhos  57  07-25    Blood Culture: 07-24 @ 11:00  Organism --  Gram Stain Blood -- Gram Stain --  Specimen Source .Blood Blood  Culture-Blood --    07-24 @ 10:59  Organism --  Gram Stain Blood -- Gram Stain --  Specimen Source .Blood Blood  Culture-Blood --    07-22 @ 06:41  Organism Staphylococcus aureus  Gram Stain Blood -- Gram Stain   Numerous polymorphonuclear leukocytes per low power field  No Squamous epithelial cells per low power field  Rare Gram Positive Cocci in Pairs and Chains  Specimen Source .Sputum Sputum  Culture-Blood --    RADIOLOGY & ADDITIONAL STUDIES:  Reviewed     MEDICATIONS  (STANDING):  aspirin  chewable 81 milliGRAM(s) Oral daily  atorvastatin 20 milliGRAM(s) Oral at bedtime  buPROPion XL . 150 milliGRAM(s) Oral daily  chlorhexidine 2% Cloths 1 Application(s) Topical <User Schedule>  chlorhexidine 4% Liquid 1 Application(s) Topical <User Schedule>  clopidogrel Tablet 75 milliGRAM(s) Oral daily  enoxaparin Injectable 40 milliGRAM(s) SubCutaneous daily  lidocaine   Patch 1 Patch Transdermal daily  magnesium oxide 400 milliGRAM(s) Oral three times a day with meals  melatonin 5 milliGRAM(s) Oral at bedtime  midodrine 15 milliGRAM(s) Oral every 8 hours  nystatin Cream 1 Application(s) Topical two times a day  saccharomyces boulardii 250 milliGRAM(s) Oral two times a day  senna 2 Tablet(s) Oral at bedtime  tamsulosin 0.4 milliGRAM(s) Oral at bedtime    MEDICATIONS  (PRN):  acetaminophen   Tablet .. 650 milliGRAM(s) Oral every 6 hours PRN Temp greater or equal to 38C (100.4F), Moderate Pain (4 - 6)

## 2020-07-27 NOTE — PROGRESS NOTE ADULT - SUBJECTIVE AND OBJECTIVE BOX
Pt seen and assessed.    Interval Hx:  No event. Feel very well. CRTD site and groins are free from any swelling or hematoma. HIT screen negative, B/Cx negative, afebrile, and WBC normalized. H/H stable and CT negative for RPB or hematoma.     TELE: Sinus with Biventricular pacing    MEDICATIONS  (STANDING):  aspirin  chewable 81 milliGRAM(s) Oral daily  atorvastatin 20 milliGRAM(s) Oral at bedtime  buPROPion XL . 150 milliGRAM(s) Oral daily  chlorhexidine 2% Cloths 1 Application(s) Topical <User Schedule>  chlorhexidine 4% Liquid 1 Application(s) Topical <User Schedule>  clopidogrel Tablet 75 milliGRAM(s) Oral daily  enoxaparin Injectable 40 milliGRAM(s) SubCutaneous daily  lidocaine   Patch 1 Patch Transdermal daily  magnesium oxide 400 milliGRAM(s) Oral three times a day with meals  melatonin 5 milliGRAM(s) Oral at bedtime  metoprolol succinate ER 12.5 milliGRAM(s) Oral daily  morphine  - Injectable 2 milliGRAM(s) IV Push once  nystatin Cream 1 Application(s) Topical two times a day  potassium chloride    Tablet ER 40 milliEquivalent(s) Oral every 4 hours  saccharomyces boulardii 250 milliGRAM(s) Oral two times a day  senna 2 Tablet(s) Oral at bedtime  tamsulosin 0.4 milliGRAM(s) Oral at bedtime    Vital Signs Last 24 Hrs are stable  Vital Signs Last 24 Hrs  T(C): 36.3 (27 Jul 2020 09:54), Max: 36.6 (26 Jul 2020 22:30)  T(F): 97.4 (27 Jul 2020 09:54), Max: 97.8 (26 Jul 2020 22:30)  HR: 70 (27 Jul 2020 09:54) (65 - 84)  BP: 137/73 (27 Jul 2020 09:54) (83/50 - 137/79)  BP(mean): 82 (26 Jul 2020 16:15) (63 - 82)  RR: 19 (27 Jul 2020 09:54) (18 - 22)  SpO2: 95% (27 Jul 2020 09:54) (95% - 100%)    Physical Exam:  Constitutional: awake, alert sitting in chair, tongue pale and dry   Cardiovascular: +S1S2 RRR,   LACW device site +steristrips intact without bleeding, swelling, hematoma   Pulmonary: decreased at bases, poor effort   GI: soft NTND +BS  Extremities: no edema  Groins: Right soft, non tender, +ecchymosis, no hematoma, no audible bruit. Left groin: +ecchymosis extending to testicles and around left flank. +testicular swelling consistent with prior exams and sonograms revealing large inguinal hernia and hydroceles. No audible bruit.  Neuro: non focal, speech clear  +Knox->BSG valdez urine  +LUE midline, site wnl    LABS:           No B/W in the last 24 hours.       RADIOLOGY & ADDITIONAL TESTS:    TTE 7/17/2020: enlarged LA, EF <20%, moderately reduced RVFx,     A/P: 79 year old male w/PMHx significant for CVA, ischemic cardiomyopathy HFrEF <20% with monomorphic VT, s/p PCI to ostial lad 6/2020 who presented to [facility] with syncope and recurrent VT.  He underwent another PCI to mLAD this admission, but continued to have episodes of prolonged VT.  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  Overnight 7/20/2020 TVP appeared to be capturing inappropriately on VVI w/rate of 30, intrinsic rate 50-60's.  TVP was turned off for fear of R on T precipitating an arrhythmia.  Pt subsequently underwent 2x 5-6 second pauses followed by asystolic arrest.  ACLS initiated, pt intubated, TVP turned back on and 1 epi given with ROSC.  Pt returned to EP lab 7/21/2020 for MDT CRT-D implant via left cephalic access.  Successfully extubated overnight 7/22/2020.  Noted to have new onset atrial flutter on 7/22/2020 AM.  Bedside atrial ATP through device performed by Dr Wrihgt, with successful conversion to sinus rhythm however, pt now with recurrent atrial flutter.  NOAC held due to recent CRT-D implant and drop in H/H and PLTs. Pt downgraded to medicine service overnight.    Interval Hx:  No event. Feel very well. CRTD site and groins are free from any swelling or hematoma. HIT screen negative, B/Cx negative, afebrile, and WBC normalized. H/H stable and CT negative for RPB or hematoma.     TELE: Sinus with Biventricular pacing    1. VT storm  - s/p successful ablation, no recurrence. All anti-arrhythmic drugs stopped.    2. thrombocytopenia  - Hematology consulted. HIT screen  negative. Stable    3. MDT CRT-D implant   -no lifting left arm over shoulder x 6 weeks  -outpt follow up 2 weeks, [facility] for site and device check  - coud not get PA-Lateral CXR    4. New onset atrial flutter  -Ok to start Eliquis today, pls consider stopping ASA (and use Eliquis plus Plavix, rather than triple therapy). Consider PPI given new NOAC,.  - May benefit from AFL ablation.     5. ICM/HFrEF  - cont GDMT as tolerated    Ok to go home from our point of view

## 2020-07-27 NOTE — PROGRESS NOTE ADULT - ASSESSMENT
79 years old male with history of stroke, depression, hypertension, and coronary artery disease who was previously admitted for ventricular tachycardia found to have cardiomyopathy and required percutaneous coronary intervention to the left anterior descending artery and was discharged home with an external defibrillator vest. The patient presented with episodes of syncope and alarms from the defibrillator vest found to have ventricular tachycardia. The patient was placed on antiarrhythmic medications and underwent VT ablation. He subsequently had cardiac arrest and required intubation with initiation of vasopressor support and empiric antibiotics. An AICD was implanted and the patient subsequently extubated.      1) Ventricular tachycardia / Cardiac arrest   - Status post VT ablation and AICD implantation  - Cardiology and Electrophysiology consultation noted  - Metoprolol and Lisinopril on hold due to hypotension  - Levophed weaned off and now on Midodrine    2) Acute Respiratory Failure / Suspected Aspiration  - s/p extubation  - Finished Zosyn   - ID follow up noted      3) CAD   - s/p PCI  - Continue Aspirin, Plavix and Lipitor  - Lisinopril and Metoprolol on hold due to hypotension    4) New Onset Atrial Flutter  - Rate controlled  - Continue Aspirin   - AC once thought to be feasible by Electrophysiology    5) Thrombocytopenia  - HIT ruled out  - No signs of bleeding  - Monitor     6) CARLA   - Resolved  - Monitor renal function    7) Urinary Retention  - Failed voiding trials  repeat voiding trial today  - Continue Knox's Catheter   - Continue Flomax     8) Left Inguinal Hernia  - Outpatient follow up     9) Depression   - Continue Bupropion     10) Hypokalemia / Hypomagnesemia   - Replaced    11) Dysphagia  - Speech therapy recommending Mechanical Soft with Thin Liquids however patient wants to take Puree instead of Mechanical Soft.         stable for discharge but home aides were not restarted.

## 2020-07-27 NOTE — DISCHARGE NOTE NURSING/CASE MANAGEMENT/SOCIAL WORK - PATIENT PORTAL LINK FT
You can access the FollowMyHealth Patient Portal offered by [facility] by registering at the following website: [email or web address]. By joining [employer]’s FollowMyHealth portal, you will also be able to view your health information using other applications (apps) compatible with our system.

## 2020-07-27 NOTE — PROGRESS NOTE ADULT - SUBJECTIVE AND OBJECTIVE BOX
[place] [facility] - [facility], THE HEART CENTER                                   [address] [place] [address]                                                      PHONE: [phone]                                                         FAX: [phone]  [email or web address]  ---------------------------------------------------------------------------------------------------------------------------------    Overnight events/patient complaints:  no events     PAST MEDICAL & SURGICAL HISTORY:  Stroke  Osteoarthritis  Hyperlipidemia  Depression  HTN (hypertension)  S/P stroke due to cerebrovascular disease      No Known Allergies    MEDICATIONS  (STANDING):  aspirin  chewable 81 milliGRAM(s) Oral daily  atorvastatin 20 milliGRAM(s) Oral at bedtime  buPROPion XL . 150 milliGRAM(s) Oral daily  chlorhexidine 2% Cloths 1 Application(s) Topical <User Schedule>  chlorhexidine 4% Liquid 1 Application(s) Topical <User Schedule>  clopidogrel Tablet 75 milliGRAM(s) Oral daily  enoxaparin Injectable 40 milliGRAM(s) SubCutaneous daily  lidocaine   Patch 1 Patch Transdermal daily  magnesium oxide 400 milliGRAM(s) Oral three times a day with meals  melatonin 5 milliGRAM(s) Oral at bedtime  morphine  - Injectable 2 milliGRAM(s) IV Push once  nystatin Cream 1 Application(s) Topical two times a day  potassium chloride    Tablet ER 40 milliEquivalent(s) Oral every 4 hours  saccharomyces boulardii 250 milliGRAM(s) Oral two times a day  senna 2 Tablet(s) Oral at bedtime  tamsulosin 0.4 milliGRAM(s) Oral at bedtime    MEDICATIONS  (PRN):  acetaminophen   Tablet .. 650 milliGRAM(s) Oral every 6 hours PRN Temp greater or equal to 38C (100.4F), Moderate Pain (4 - 6)      Vital Signs Last 24 Hrs  T(C): 36.3 (27 Jul 2020 09:54), Max: 36.6 (26 Jul 2020 12:04)  T(F): 97.4 (27 Jul 2020 09:54), Max: 97.8 (26 Jul 2020 12:04)  HR: 70 (27 Jul 2020 09:54) (65 - 84)  BP: 137/73 (27 Jul 2020 09:54) (83/50 - 137/79)  BP(mean): 82 (26 Jul 2020 16:15) (63 - 82)  RR: 19 (27 Jul 2020 09:54) (15 - 22)  SpO2: 95% (27 Jul 2020 09:54) (95% - 100%)  ICU Vital Signs Last 24 Hrs  SASKIA ELIAS  I&O's Detail    26 Jul 2020 07:01  -  27 Jul 2020 07:00  --------------------------------------------------------  IN:  Total IN: 0 mL    OUT:    Indwelling Catheter - Urethral: 525 mL  Total OUT: 525 mL    Total NET: -525 mL        I&O's Summary    26 Jul 2020 07:01  -  27 Jul 2020 07:00  --------------------------------------------------------  IN: 0 mL / OUT: 525 mL / NET: -525 mL      Drug Dosing Weight  SASKIA ELIAS      PHYSICAL EXAM:  General: Appears well developed, well nourished alert and cooperative.  HEENT: Head; normocephalic, atraumatic.  Eyes: Pupils reactive, cornea wnl.  Neck: Supple, no nodes adenopathy, no NVD or carotid bruit or thyromegaly.  CARDIOVASCULAR: Normal S1 and S2, No murmur, rub, gallop or lift.   LUNGS: No rales, rhonchi or wheeze. Normal breath sounds bilaterally.  ABDOMEN: Soft, nontender without mass or organomegaly. bowel sounds normoactive.  EXTREMITIES: No clubbing, cyanosis or edema. Distal pulses wnl.   SKIN: warm and dry with normal turgor.  NEURO: Alert/oriented x 3/normal motor exam. No pathologic reflexes.    PSYCH: normal affect.   Summary:   1. Endocardial visualization was enhanced withintravenous echo contrast.   2. Mildly enlarged left atrium.   3. Left ventricular ejection fraction, by visual estimation, is <20%.   4. The right atrium is normal in size.   5. The right ventricular size is mildly enlarged. Moderately reduced RV systolic function.   6. No significant valvular abnomality.   7. There is no evidence of pericardial effusion.   8. Dilatation of the aortic root and sinuses of Valsalva.   9. Comapred to study from 6/2020, there is no significant change in LV function.    MD Rona, RPVI Electronically signed on 7/17/2020 at 4:05:19 PM         CARDIAC CATHETERIZATION:  HEMODYNAMICS: Hemodynamic assessment demonstrates mildly elevated LVEDP.  CORONARY VESSELS: The coronary circulation is left dominant.  LM:   --  Mid left main: There was a 30 % stenosis.  --  Distal left main: Angiography showed minor luminal irregularities with  no flow limiting lesions.  LAD:   --  Proximal LAD: There was a 0 % stenosis at the site of a prior  stent.  --  Mid LAD: There was a 70 % stenosis.  CX:   --  Proximal circumflex: Angiography showed minor luminal  irregularities with no flow limiting lesions.  --  Mid circumflex: Angiography showed minor luminal irregularities with no  flow limiting lesions.  --  Distal circumflex: Angiography showed minor luminal irregularities with  no flow limiting lesions.  --  L AV groove: Angiography showed minor luminal irregularities with no  flow limiting lesions.  RCA:   --  Proximal RCA: Angiography showed minor luminal irregularities  with no flow limiting lesions.  --  Mid RCA: There was a 40 % stenosis.  COMPLICATIONS: There were no complications. No complications occurred  during the cath lab visit.  SUMMARY:  HEMODYNAMICS: Hemodynamic assessment demonstrates mildly elevated LVEDP.  DIAGNOSTIC IMPRESSIONS: Patent ostial LAD stent.  IVUS and iFR of LAD shoed 70% mid LAD stenosis with positive iFR s/p IVUS  assisted PTCA and 3.0 x 34 BENSON postdilated proximally to 4.5mm.  DIAGNOSTIC RECOMMENDATIONS: Medical therapy for CAD  ICD evaluation for sustained VT.  INTERVENTIONAL IMPRESSIONS: Patent ostialLAD stent.  IVUS and iFR of LAD shoed 70% mid LAD stenosis with positive iFR s/p IVUS  assisted PTCA and 3.0 x 34 BENSON postdilated proximally to 4.5mm.  INTERVENTIONAL RECOMMENDATIONS: Medical therapy for CAD  ICD evaluation for sustained VT.  Prepared and signed by  Roger Robert MD      ASSESSMENT AND PLAN:  In summary, SASKIA ELIAS is an 79y Male with past medical history significant for HTN, HLD, CVA, CAD/MI s/p BENSON to LAD presents with recurrent VT s/p ablation and course complicated by asystolic arrest/TVP and subsequent BIV-ICD; Hypotension improved with NS 500cc; Lasix 40 mg yesterday 2.3 out patient; limited ECHO TDS no clear evidence of effusion pericardial; Prox Aflutter not on long term AC due to bleeding risk; CT ABD negative for hematoma and repeat TTE limited negative for effusion     Plan:  1.  Continue DAPT and monitor H/H  2.  Restart optimal medical therapy if BP stable    3.  Will need eliquis restarted as per ep   4.  ID and Hematology following     Thank you for allowing [facility] to participate in the care of this patient.  Please feel free to call with any questions or concerns. [place] [facility] - [facility], THE HEART CENTER                                   [address] [place] [address]                                                      PHONE: [phone]                                                         FAX: [phone]  [email or web address]  ---------------------------------------------------------------------------------------------------------------------------------    Overnight events/patient complaints:  no events     PAST MEDICAL & SURGICAL HISTORY:  Stroke  Osteoarthritis  Hyperlipidemia  Depression  HTN (hypertension)  S/P stroke due to cerebrovascular disease      No Known Allergies    MEDICATIONS  (STANDING):  aspirin  chewable 81 milliGRAM(s) Oral daily  atorvastatin 20 milliGRAM(s) Oral at bedtime  buPROPion XL . 150 milliGRAM(s) Oral daily  chlorhexidine 2% Cloths 1 Application(s) Topical <User Schedule>  chlorhexidine 4% Liquid 1 Application(s) Topical <User Schedule>  clopidogrel Tablet 75 milliGRAM(s) Oral daily  enoxaparin Injectable 40 milliGRAM(s) SubCutaneous daily  lidocaine   Patch 1 Patch Transdermal daily  magnesium oxide 400 milliGRAM(s) Oral three times a day with meals  melatonin 5 milliGRAM(s) Oral at bedtime  morphine  - Injectable 2 milliGRAM(s) IV Push once  nystatin Cream 1 Application(s) Topical two times a day  potassium chloride    Tablet ER 40 milliEquivalent(s) Oral every 4 hours  saccharomyces boulardii 250 milliGRAM(s) Oral two times a day  senna 2 Tablet(s) Oral at bedtime  tamsulosin 0.4 milliGRAM(s) Oral at bedtime    MEDICATIONS  (PRN):  acetaminophen   Tablet .. 650 milliGRAM(s) Oral every 6 hours PRN Temp greater or equal to 38C (100.4F), Moderate Pain (4 - 6)      Vital Signs Last 24 Hrs  T(C): 36.3 (27 Jul 2020 09:54), Max: 36.6 (26 Jul 2020 12:04)  T(F): 97.4 (27 Jul 2020 09:54), Max: 97.8 (26 Jul 2020 12:04)  HR: 70 (27 Jul 2020 09:54) (65 - 84)  BP: 137/73 (27 Jul 2020 09:54) (83/50 - 137/79)  BP(mean): 82 (26 Jul 2020 16:15) (63 - 82)  RR: 19 (27 Jul 2020 09:54) (15 - 22)  SpO2: 95% (27 Jul 2020 09:54) (95% - 100%)  ICU Vital Signs Last 24 Hrs  SASKIA ELIAS  I&O's Detail    26 Jul 2020 07:01  -  27 Jul 2020 07:00  --------------------------------------------------------  IN:  Total IN: 0 mL    OUT:    Indwelling Catheter - Urethral: 525 mL  Total OUT: 525 mL    Total NET: -525 mL        I&O's Summary    26 Jul 2020 07:01  -  27 Jul 2020 07:00  --------------------------------------------------------  IN: 0 mL / OUT: 525 mL / NET: -525 mL      Drug Dosing Weight  SASKIA ELIAS      PHYSICAL EXAM:  General: Appears well developed, well nourished alert and cooperative.  HEENT: Head; normocephalic, atraumatic.  Eyes: Pupils reactive, cornea wnl.  Neck: Supple, no nodes adenopathy, no NVD or carotid bruit or thyromegaly.  CARDIOVASCULAR: Normal S1 and S2, No murmur, rub, gallop or lift.   LUNGS: No rales, rhonchi or wheeze. Normal breath sounds bilaterally.  ABDOMEN: Soft, nontender without mass or organomegaly. bowel sounds normoactive.  EXTREMITIES: No clubbing, cyanosis or edema. Distal pulses wnl.   SKIN: warm and dry with normal turgor.  NEURO: Alert/oriented x 3/normal motor exam. No pathologic reflexes.    PSYCH: normal affect.   Summary:   1. Endocardial visualization was enhanced withintravenous echo contrast.   2. Mildly enlarged left atrium.   3. Left ventricular ejection fraction, by visual estimation, is <20%.   4. The right atrium is normal in size.   5. The right ventricular size is mildly enlarged. Moderately reduced RV systolic function.   6. No significant valvular abnomality.   7. There is no evidence of pericardial effusion.   8. Dilatation of the aortic root and sinuses of Valsalva.   9. Comapred to study from 6/2020, there is no significant change in LV function.    MD Rona, RPVI Electronically signed on 7/17/2020 at 4:05:19 PM         CARDIAC CATHETERIZATION:  HEMODYNAMICS: Hemodynamic assessment demonstrates mildly elevated LVEDP.  CORONARY VESSELS: The coronary circulation is left dominant.  LM:   --  Mid left main: There was a 30 % stenosis.  --  Distal left main: Angiography showed minor luminal irregularities with  no flow limiting lesions.  LAD:   --  Proximal LAD: There was a 0 % stenosis at the site of a prior  stent.  --  Mid LAD: There was a 70 % stenosis.  CX:   --  Proximal circumflex: Angiography showed minor luminal  irregularities with no flow limiting lesions.  --  Mid circumflex: Angiography showed minor luminal irregularities with no  flow limiting lesions.  --  Distal circumflex: Angiography showed minor luminal irregularities with  no flow limiting lesions.  --  L AV groove: Angiography showed minor luminal irregularities with no  flow limiting lesions.  RCA:   --  Proximal RCA: Angiography showed minor luminal irregularities  with no flow limiting lesions.  --  Mid RCA: There was a 40 % stenosis.  COMPLICATIONS: There were no complications. No complications occurred  during the cath lab visit.  SUMMARY:  HEMODYNAMICS: Hemodynamic assessment demonstrates mildly elevated LVEDP.  DIAGNOSTIC IMPRESSIONS: Patent ostial LAD stent.  IVUS and iFR of LAD shoed 70% mid LAD stenosis with positive iFR s/p IVUS  assisted PTCA and 3.0 x 34 BENSON postdilated proximally to 4.5mm.  DIAGNOSTIC RECOMMENDATIONS: Medical therapy for CAD  ICD evaluation for sustained VT.  INTERVENTIONAL IMPRESSIONS: Patent ostialLAD stent.  IVUS and iFR of LAD shoed 70% mid LAD stenosis with positive iFR s/p IVUS  assisted PTCA and 3.0 x 34 BENSON postdilated proximally to 4.5mm.  INTERVENTIONAL RECOMMENDATIONS: Medical therapy for CAD  ICD evaluation for sustained VT.  Prepared and signed by  Roger Robert MD      ASSESSMENT AND PLAN:  In summary, SASKIA ELIAS is an 79y Male with past medical history significant for HTN, HLD, CVA, CAD/MI s/p BENSON to LAD presents with recurrent VT s/p ablation and course complicated by asystolic arrest/TVP and subsequent BIV-ICD; Hypotension improved with NS 500cc; Lasix 40 mg yesterday 2.3 out patient; limited ECHO TDS no clear evidence of effusion pericardial; Prox Aflutter not on long term AC due to bleeding risk; CT ABD negative for hematoma and repeat TTE limited negative for effusion     Plan:  1.  Continue DAPT and monitor H/H  2.  Restart optimal medical therapy if BP stable  -- started toprol 12.5 mg daily, will consider next starting entresto   3.  Will need eliquis restarted as per ep   4.  ID and Hematology following     Thank you for allowing [facility] to participate in the care of this patient.  Please feel free to call with any questions or concerns. [place] [facility] - [facility], THE [facility] CENTER                                   [address] [place] [address]                                                      PHONE: [phone]                                                         FAX: [phone]  [email or web address]  ---------------------------------------------------------------------------------------------------------------------------------    Overnight events/patient complaints:  no events     PAST MEDICAL & SURGICAL HISTORY:  Stroke  Osteoarthritis  Hyperlipidemia  Depression  HTN (hypertension)  S/P stroke due to cerebrovascular disease      No Known Allergies    MEDICATIONS  (STANDING):  aspirin  chewable 81 milliGRAM(s) Oral daily  atorvastatin 20 milliGRAM(s) Oral at bedtime  buPROPion XL . 150 milliGRAM(s) Oral daily  chlorhexidine 2% Cloths 1 Application(s) Topical <User Schedule>  chlorhexidine 4% Liquid 1 Application(s) Topical <User Schedule>  clopidogrel Tablet 75 milliGRAM(s) Oral daily  enoxaparin Injectable 40 milliGRAM(s) SubCutaneous daily  lidocaine   Patch 1 Patch Transdermal daily  magnesium oxide 400 milliGRAM(s) Oral three times a day with meals  melatonin 5 milliGRAM(s) Oral at bedtime  morphine  - Injectable 2 milliGRAM(s) IV Push once  nystatin Cream 1 Application(s) Topical two times a day  potassium chloride    Tablet ER 40 milliEquivalent(s) Oral every 4 hours  saccharomyces boulardii 250 milliGRAM(s) Oral two times a day  senna 2 Tablet(s) Oral at bedtime  tamsulosin 0.4 milliGRAM(s) Oral at bedtime    MEDICATIONS  (PRN):  acetaminophen   Tablet .. 650 milliGRAM(s) Oral every 6 hours PRN Temp greater or equal to 38C (100.4F), Moderate Pain (4 - 6)      Vital Signs Last 24 Hrs  T(C): 36.3 (27 Jul 2020 09:54), Max: 36.6 (26 Jul 2020 12:04)  T(F): 97.4 (27 Jul 2020 09:54), Max: 97.8 (26 Jul 2020 12:04)  HR: 70 (27 Jul 2020 09:54) (65 - 84)  BP: 137/73 (27 Jul 2020 09:54) (83/50 - 137/79)  BP(mean): 82 (26 Jul 2020 16:15) (63 - 82)  RR: 19 (27 Jul 2020 09:54) (15 - 22)  SpO2: 95% (27 Jul 2020 09:54) (95% - 100%)  ICU Vital Signs Last 24 Hrs  SASKIA ELIAS  I&O's Detail    26 Jul 2020 07:01  -  27 Jul 2020 07:00  --------------------------------------------------------  IN:  Total IN: 0 mL    OUT:    Indwelling Catheter - Urethral: 525 mL  Total OUT: 525 mL    Total NET: -525 mL        I&O's Summary    26 Jul 2020 07:01  -  27 Jul 2020 07:00  --------------------------------------------------------  IN: 0 mL / OUT: 525 mL / NET: -525 mL      Drug Dosing Weight  SASKIA ELIAS      PHYSICAL EXAM:  General: Appears well developed, well nourished alert and cooperative.  HEENT: Head; normocephalic, atraumatic.  Eyes: Pupils reactive, cornea wnl.  Neck: Supple, no nodes adenopathy, no NVD or carotid bruit or thyromegaly.  CARDIOVASCULAR: Normal S1 and S2, No murmur, rub, gallop or lift.   LUNGS: No rales, rhonchi or wheeze. Normal breath sounds bilaterally.  ABDOMEN: Soft, nontender without mass or organomegaly. bowel sounds normoactive.  EXTREMITIES: No clubbing, cyanosis or edema. Distal pulses wnl.   SKIN: warm and dry with normal turgor.  NEURO: Alert/oriented x 3/normal motor exam. No pathologic reflexes.    PSYCH: normal affect.   Summary:   1. Endocardial visualization was enhanced withintravenous echo contrast.   2. Mildly enlarged left atrium.   3. Left ventricular ejection fraction, by visual estimation, is <20%.   4. The right atrium is normal in size.   5. The right ventricular size is mildly enlarged. Moderately reduced RV systolic function.   6. No significant valvular abnomality.   7. There is no evidence of pericardial effusion.   8. Dilatation of the aortic root and sinuses of Valsalva.   9. Comapred to study from 6/2020, there is no significant change in LV function.    MD Rona, RPVI Electronically signed on 7/17/2020 at 4:05:19 PM         CARDIAC CATHETERIZATION:  HEMODYNAMICS: Hemodynamic assessment demonstrates mildly elevated LVEDP.  CORONARY VESSELS: The coronary circulation is left dominant.  LM:   --  Mid left main: There was a 30 % stenosis.  --  Distal left main: Angiography showed minor luminal irregularities with  no flow limiting lesions.  LAD:   --  Proximal LAD: There was a 0 % stenosis at the site of a prior  stent.  --  Mid LAD: There was a 70 % stenosis.  CX:   --  Proximal circumflex: Angiography showed minor luminal  irregularities with no flow limiting lesions.  --  Mid circumflex: Angiography showed minor luminal irregularities with no  flow limiting lesions.  --  Distal circumflex: Angiography showed minor luminal irregularities with  no flow limiting lesions.  --  L AV groove: Angiography showed minor luminal irregularities with no  flow limiting lesions.  RCA:   --  Proximal RCA: Angiography showed minor luminal irregularities  with no flow limiting lesions.  --  Mid RCA: There was a 40 % stenosis.  COMPLICATIONS: There were no complications. No complications occurred  during the cath lab visit.  SUMMARY:  HEMODYNAMICS: Hemodynamic assessment demonstrates mildly elevated LVEDP.  DIAGNOSTIC IMPRESSIONS: Patent ostial LAD stent.  IVUS and iFR of LAD shoed 70% mid LAD stenosis with positive iFR s/p IVUS  assisted PTCA and 3.0 x 34 BENSON postdilated proximally to 4.5mm.  DIAGNOSTIC RECOMMENDATIONS: Medical therapy for CAD  ICD evaluation for sustained VT.  INTERVENTIONAL IMPRESSIONS: Patent ostialLAD stent.  IVUS and iFR of LAD shoed 70% mid LAD stenosis with positive iFR s/p IVUS  assisted PTCA and 3.0 x 34 BENSON postdilated proximally to 4.5mm.  INTERVENTIONAL RECOMMENDATIONS: Medical therapy for CAD  ICD evaluation for sustained VT.  Prepared and signed by  Roger Robert MD      ASSESSMENT AND PLAN:  In summary, SASKIA ELIAS is an 79y Male with past medical history significant for HTN, HLD, CVA, CAD/MI s/p BENSON to LAD presents with recurrent VT s/p ablation and course complicated by asystolic arrest/TVP and subsequent BIV-ICD; Hypotension improved with NS 500cc; Lasix 40 mg yesterday 2.3 out patient; limited ECHO TDS no clear evidence of effusion pericardial; Prox Aflutter not on long term AC due to bleeding risk; CT ABD negative for hematoma and repeat TTE limited negative for effusion     Plan:  -  Restart optimal medical therapy if BP stable  -- started toprol 12.5 mg daily, will consider next starting entresto (as outpt if bp tolerates)  -  Will need eliquis restarted for aflutter as per EP/Interventional-    -  pt will need likely outpt aflutter ablation   -  pt informed me he is being planned for d/c home today  -  will arrange outpt f/u in office this week     Thank you for allowing [facility] to participate in the care of this patient.  Please feel free to call with any questions or concerns. [place] [facility] - [facility], THE HEART CENTER                                   [address] [place] [address]                                                      PHONE: [phone]                                                         FAX: [phone]  [email or web address]  ---------------------------------------------------------------------------------------------------------------------------------    Overnight events/patient complaints:  no events     PAST MEDICAL & SURGICAL HISTORY:  Stroke  Osteoarthritis  Hyperlipidemia  Depression  HTN (hypertension)  S/P stroke due to cerebrovascular disease      No Known Allergies    MEDICATIONS  (STANDING):  aspirin  chewable 81 milliGRAM(s) Oral daily  atorvastatin 20 milliGRAM(s) Oral at bedtime  buPROPion XL . 150 milliGRAM(s) Oral daily  chlorhexidine 2% Cloths 1 Application(s) Topical <User Schedule>  chlorhexidine 4% Liquid 1 Application(s) Topical <User Schedule>  clopidogrel Tablet 75 milliGRAM(s) Oral daily  enoxaparin Injectable 40 milliGRAM(s) SubCutaneous daily  lidocaine   Patch 1 Patch Transdermal daily  magnesium oxide 400 milliGRAM(s) Oral three times a day with meals  melatonin 5 milliGRAM(s) Oral at bedtime  morphine  - Injectable 2 milliGRAM(s) IV Push once  nystatin Cream 1 Application(s) Topical two times a day  potassium chloride    Tablet ER 40 milliEquivalent(s) Oral every 4 hours  saccharomyces boulardii 250 milliGRAM(s) Oral two times a day  senna 2 Tablet(s) Oral at bedtime  tamsulosin 0.4 milliGRAM(s) Oral at bedtime    MEDICATIONS  (PRN):  acetaminophen   Tablet .. 650 milliGRAM(s) Oral every 6 hours PRN Temp greater or equal to 38C (100.4F), Moderate Pain (4 - 6)      Vital Signs Last 24 Hrs  T(C): 36.3 (27 Jul 2020 09:54), Max: 36.6 (26 Jul 2020 12:04)  T(F): 97.4 (27 Jul 2020 09:54), Max: 97.8 (26 Jul 2020 12:04)  HR: 70 (27 Jul 2020 09:54) (65 - 84)  BP: 137/73 (27 Jul 2020 09:54) (83/50 - 137/79)  BP(mean): 82 (26 Jul 2020 16:15) (63 - 82)  RR: 19 (27 Jul 2020 09:54) (15 - 22)  SpO2: 95% (27 Jul 2020 09:54) (95% - 100%)  ICU Vital Signs Last 24 Hrs  SASKIA ELIAS  I&O's Detail    26 Jul 2020 07:01  -  27 Jul 2020 07:00  --------------------------------------------------------  IN:  Total IN: 0 mL    OUT:    Indwelling Catheter - Urethral: 525 mL  Total OUT: 525 mL    Total NET: -525 mL        I&O's Summary    26 Jul 2020 07:01  -  27 Jul 2020 07:00  --------------------------------------------------------  IN: 0 mL / OUT: 525 mL / NET: -525 mL      Drug Dosing Weight  SASKIA ELIAS      PHYSICAL EXAM:  General: Appears well developed, well nourished alert and cooperative.  HEENT: Head; normocephalic, atraumatic.  Eyes: Pupils reactive, cornea wnl.  Neck: Supple, no nodes adenopathy, no NVD or carotid bruit or thyromegaly.  CARDIOVASCULAR: Normal S1 and S2, No murmur, rub, gallop or lift.   LUNGS: No rales, rhonchi or wheeze. Normal breath sounds bilaterally.  ABDOMEN: Soft, nontender without mass or organomegaly. bowel sounds normoactive.  EXTREMITIES: No clubbing, cyanosis or edema. Distal pulses wnl.   SKIN: warm and dry with normal turgor.  NEURO: Alert/oriented x 3/normal motor exam. No pathologic reflexes.    PSYCH: normal affect.   Summary:   1. Endocardial visualization was enhanced withintravenous echo contrast.   2. Mildly enlarged left atrium.   3. Left ventricular ejection fraction, by visual estimation, is <20%.   4. The right atrium is normal in size.   5. The right ventricular size is mildly enlarged. Moderately reduced RV systolic function.   6. No significant valvular abnomality.   7. There is no evidence of pericardial effusion.   8. Dilatation of the aortic root and sinuses of Valsalva.   9. Comapred to study from 6/2020, there is no significant change in LV function.    MD Rona, RPVI Electronically signed on 7/17/2020 at 4:05:19 PM         CARDIAC CATHETERIZATION:  HEMODYNAMICS: Hemodynamic assessment demonstrates mildly elevated LVEDP.  CORONARY VESSELS: The coronary circulation is left dominant.  LM:   --  Mid left main: There was a 30 % stenosis.  --  Distal left main: Angiography showed minor luminal irregularities with  no flow limiting lesions.  LAD:   --  Proximal LAD: There was a 0 % stenosis at the site of a prior  stent.  --  Mid LAD: There was a 70 % stenosis.  CX:   --  Proximal circumflex: Angiography showed minor luminal  irregularities with no flow limiting lesions.  --  Mid circumflex: Angiography showed minor luminal irregularities with no  flow limiting lesions.  --  Distal circumflex: Angiography showed minor luminal irregularities with  no flow limiting lesions.  --  L AV groove: Angiography showed minor luminal irregularities with no  flow limiting lesions.  RCA:   --  Proximal RCA: Angiography showed minor luminal irregularities  with no flow limiting lesions.  --  Mid RCA: There was a 40 % stenosis.  COMPLICATIONS: There were no complications. No complications occurred  during the cath lab visit.  SUMMARY:  HEMODYNAMICS: Hemodynamic assessment demonstrates mildly elevated LVEDP.  DIAGNOSTIC IMPRESSIONS: Patent ostial LAD stent.  IVUS and iFR of LAD shoed 70% mid LAD stenosis with positive iFR s/p IVUS  assisted PTCA and 3.0 x 34 BENSON postdilated proximally to 4.5mm.  DIAGNOSTIC RECOMMENDATIONS: Medical therapy for CAD  ICD evaluation for sustained VT.  INTERVENTIONAL IMPRESSIONS: Patent ostialLAD stent.  IVUS and iFR of LAD shoed 70% mid LAD stenosis with positive iFR s/p IVUS  assisted PTCA and 3.0 x 34 BENSON postdilated proximally to 4.5mm.  INTERVENTIONAL RECOMMENDATIONS: Medical therapy for CAD  ICD evaluation for sustained VT.  Prepared and signed by  Roger Robert MD      ASSESSMENT AND PLAN:  In summary, SASKIA ELIAS is an 79y Male with past medical history significant for HTN, HLD, CVA, CAD/MI s/p BENSON to LAD presents with recurrent VT s/p ablation and course complicated by asystolic arrest/TVP and subsequent BIV-ICD; Hypotension improved with NS 500cc; Lasix 40 mg yesterday 2.3 out patient; limited ECHO TDS no clear evidence of effusion pericardial; Prox Aflutter ; CT ABD negative for hematoma and repeat TTE limited negative for effusion     Plan:  -  Restart optimal medical therapy if BP stable  -- started toprol 12.5 mg daily, will consider next starting entresto (as outpt if bp tolerates)  -  Will need eliquis restarted for aflutter as per EP/Interventional-    -  pt will need likely outpt aflutter ablation   -  pt informed me he is being planned for d/c home today  -  will arrange outpt f/u in office this week     Thank you for allowing [facility] to participate in the care of this patient.  Please feel free to call with any questions or concerns.

## 2020-07-27 NOTE — PROGRESS NOTE ADULT - SUBJECTIVE AND OBJECTIVE BOX
seen for vt arrest    no acute complaints. wants to go home.   ros negative     MEDICATIONS  (STANDING):  apixaban 5 milliGRAM(s) Oral two times a day  atorvastatin 20 milliGRAM(s) Oral at bedtime  buPROPion XL . 150 milliGRAM(s) Oral daily  chlorhexidine 2% Cloths 1 Application(s) Topical <User Schedule>  chlorhexidine 4% Liquid 1 Application(s) Topical <User Schedule>  clopidogrel Tablet 75 milliGRAM(s) Oral daily  lidocaine   Patch 1 Patch Transdermal daily  magnesium oxide 400 milliGRAM(s) Oral three times a day with meals  melatonin 5 milliGRAM(s) Oral at bedtime  midodrine. 20 milliGRAM(s) Oral every 8 hours  nystatin Cream 1 Application(s) Topical two times a day  saccharomyces boulardii 250 milliGRAM(s) Oral two times a day  senna 2 Tablet(s) Oral at bedtime  sodium chloride 0.9% Bolus 500 milliLiter(s) IV Bolus once    MEDICATIONS  (PRN):  acetaminophen   Tablet .. 650 milliGRAM(s) Oral every 6 hours PRN Temp greater or equal to 38C (100.4F), Moderate Pain (4 - 6)  traMADol 25 milliGRAM(s) Oral four times a day PRN Severe Pain (7 - 10)      Allergies    No Known Allergies      Vital Signs Last 24 Hrs  T(C): 36.6 (28 Jul 2020 10:00), Max: 36.6 (27 Jul 2020 21:21)  T(F): 97.9 (28 Jul 2020 10:00), Max: 97.9 (28 Jul 2020 10:00)  HR: 71 (28 Jul 2020 10:30) (60 - 73)  BP: 100/60 (28 Jul 2020 10:30) (66/43 - 138/77)  BP(mean): --  RR: 20 (28 Jul 2020 10:00) (18 - 20)  SpO2: 96% (28 Jul 2020 10:00) (94% - 96%)    PHYSICAL EXAM:    GENERAL: NAD  CHEST/LUNG: Clear to percussion bilaterally  HEART: Regular rate and rhythm; S1 S2  ABDOMEN: Soft, Nontender,  Bowel sounds present  EXTREMITIES:  no edema      left groin inguinal hernia  NERVOUS SYSTEM:  Alert & Oriented X3,  LABS:                        9.9    7.53  )-----------( 141      ( 28 Jul 2020 06:42 )             30.1     07-28    138  |  102  |  17.0  ----------------------------<  89  5.0   |  26.0  |  0.62    Ca    9.3      28 Jul 2020 06:42  Mg     2.1     07-28    TPro  5.9<L>  /  Alb  3.1<L>  /  TBili  0.7  /  DBili  x   /  AST  13  /  ALT  7   /  AlkPhos  77  07-28          CAPILLARY BLOOD GLUCOSE            RADIOLOGY & ADDITIONAL TESTS:

## 2020-07-27 NOTE — CHART NOTE - NSCHARTNOTEFT_GEN_A_CORE
Called to see patient who wants to sign out AMA  Patient is angry that he was ready for d/c but Home health aides were not reinstated  Discussed situation with patient for some time  He has now decided to stay and wants to home tomorrow am

## 2020-07-28 NOTE — PROGRESS NOTE ADULT - ASSESSMENT
79 years old male with history of stroke, depression, hypertension, and coronary artery disease who was previously admitted for ventricular tachycardia found to have cardiomyopathy and required percutaneous coronary intervention to the left anterior descending artery and was discharged home with an external defibrillator vest. The patient presented with episodes of syncope and alarms from the defibrillator vest found to have ventricular tachycardia. The patient was placed on antiarrhythmic medications and underwent VT ablation. He subsequently had cardiac arrest and required intubation with initiation of vasopressor support and empiric antibiotics. An AICD was implanted and the patient subsequently extubated.      hypotension: intolerant of low dose toprol    hold flomax as well    restart midodrine, s/p 500cc NS bolus      1) Ventricular tachycardia / Cardiac arrest   - Status post VT ablation and AICD implantation  - Cardiology and Electrophysiology consultation noted  - Metoprolol and Lisinopril on hold due to hypotension  - Levophed weaned off and now on Midodrine    2) Acute Respiratory Failure / Suspected Aspiration  - s/p extubation  - Finished Zosyn   - ID follow up noted      3) CAD   - s/p PCI  - STOP Aspirin to avoid Triple therapy   - c/w Plavix and Lipitor  - Lisinopril and Metoprolol on hold due to hypotension    4) New Onset Atrial Flutter  - Rate controlled  - Continue Aspirin   - eliquis started as cleared by Electrophysiology    5) Thrombocytopenia  - HIT ruled out  - No signs of bleeding  - Monitor     6) CARLA   - Resolved  - Monitor renal function    7) Urinary Retention  - passed TOV  - Continue Knox's Catheter     8) Left Inguinal Hernia  - Outpatient follow up     9) Depression   - Continue Bupropion     10) Hypokalemia / Hypomagnesemia   - Replaced    11) Dysphagia  - Speech therapy recommending Mechanical Soft with Thin Liquids however patient wants to take Puree instead of Mechanical Soft.         need to ensure stable BP prior to discharge.   high risk for decompensation if dc home. will discuss with patient re dc to ANT

## 2020-07-28 NOTE — PROGRESS NOTE ADULT - SUBJECTIVE AND OBJECTIVE BOX
seen for vt arrest    symptomatic hypotension this am---dizziness  no chest pain/sob  ros otherwise negative     MEDICATIONS  (STANDING):  apixaban 5 milliGRAM(s) Oral two times a day  atorvastatin 20 milliGRAM(s) Oral at bedtime  buPROPion XL . 150 milliGRAM(s) Oral daily  chlorhexidine 2% Cloths 1 Application(s) Topical <User Schedule>  chlorhexidine 4% Liquid 1 Application(s) Topical <User Schedule>  clopidogrel Tablet 75 milliGRAM(s) Oral daily  lidocaine   Patch 1 Patch Transdermal daily  magnesium oxide 400 milliGRAM(s) Oral three times a day with meals  melatonin 5 milliGRAM(s) Oral at bedtime  midodrine. 20 milliGRAM(s) Oral every 8 hours  nystatin Cream 1 Application(s) Topical two times a day  saccharomyces boulardii 250 milliGRAM(s) Oral two times a day  senna 2 Tablet(s) Oral at bedtime  sodium chloride 0.9% Bolus 500 milliLiter(s) IV Bolus once    MEDICATIONS  (PRN):  acetaminophen   Tablet .. 650 milliGRAM(s) Oral every 6 hours PRN Temp greater or equal to 38C (100.4F), Moderate Pain (4 - 6)  traMADol 25 milliGRAM(s) Oral four times a day PRN Severe Pain (7 - 10)      Allergies    No Known Allergies        Vital Signs Last 24 Hrs  T(C): 36.6 (28 Jul 2020 10:00), Max: 36.6 (27 Jul 2020 21:21)  T(F): 97.9 (28 Jul 2020 10:00), Max: 97.9 (28 Jul 2020 10:00)  HR: 71 (28 Jul 2020 10:30) (60 - 73)  BP: 100/60 (28 Jul 2020 10:30) (66/43 - 138/77)  BP(mean): --  RR: 20 (28 Jul 2020 10:00) (18 - 20)  SpO2: 96% (28 Jul 2020 10:00) (94% - 96%)    PHYSICAL EXAM:    GENERAL: NAD  CHEST/LUNG: Clear to percussion bilaterall  HEART: Regular rate and rhythm; S1 S2;  ABDOMEN: Soft,  Bowel sounds present  EXTREMITIES:  no edema  NERVOUS SYSTEM:  Alert & Oriented X3,  LABS:                        9.9    7.53  )-----------( 141      ( 28 Jul 2020 06:42 )             30.1     07-28    138  |  102  |  17.0  ----------------------------<  89  5.0   |  26.0  |  0.62    Ca    9.3      28 Jul 2020 06:42  Mg     2.1     07-28    TPro  5.9<L>  /  Alb  3.1<L>  /  TBili  0.7  /  DBili  x   /  AST  13  /  ALT  7   /  AlkPhos  77  07-28          CAPILLARY BLOOD GLUCOSE            RADIOLOGY & ADDITIONAL TESTS:

## 2020-07-28 NOTE — PROGRESS NOTE ADULT - SUBJECTIVE AND OBJECTIVE BOX
[place] [facility]                                   [address] [place] [address]                                                      PHONE: [phone]                                                         FAX: [phone]  [email or web address]  ---------------------------------------------------------------------------------------------------------------------------------    Overnight events/patient complaints: patient seen at bedside. He says he felt dizzy this morning when he woke up.       No Known Allergies    MEDICATIONS  (STANDING):  apixaban 5 milliGRAM(s) Oral two times a day  atorvastatin 20 milliGRAM(s) Oral at bedtime  buPROPion XL . 150 milliGRAM(s) Oral daily  chlorhexidine 2% Cloths 1 Application(s) Topical <User Schedule>  chlorhexidine 4% Liquid 1 Application(s) Topical <User Schedule>  clopidogrel Tablet 75 milliGRAM(s) Oral daily  lidocaine   Patch 1 Patch Transdermal daily  magnesium oxide 400 milliGRAM(s) Oral three times a day with meals  melatonin 5 milliGRAM(s) Oral at bedtime  midodrine. 20 milliGRAM(s) Oral every 8 hours  nystatin Cream 1 Application(s) Topical two times a day  saccharomyces boulardii 250 milliGRAM(s) Oral two times a day  senna 2 Tablet(s) Oral at bedtime  sodium chloride 0.9% Bolus 500 milliLiter(s) IV Bolus once    MEDICATIONS  (PRN):  acetaminophen   Tablet .. 650 milliGRAM(s) Oral every 6 hours PRN Temp greater or equal to 38C (100.4F), Moderate Pain (4 - 6)  traMADol 25 milliGRAM(s) Oral four times a day PRN Severe Pain (7 - 10)      Vital Signs Last 24 Hrs  T(C): 36.6 (28 Jul 2020 10:00), Max: 36.6 (27 Jul 2020 21:21)  T(F): 97.9 (28 Jul 2020 10:00), Max: 97.9 (28 Jul 2020 10:00)  HR: 60 (28 Jul 2020 10:00) (60 - 73)  BP: 110/63 (28 Jul 2020 08:02) (102/61 - 138/77)  BP(mean): --  RR: 20 (28 Jul 2020 10:00) (18 - 20)  SpO2: 96% (28 Jul 2020 10:00) (94% - 96%)  ICU Vital Signs Last 24 Hrs  SASKIA ELIAS  I&O's Detail    27 Jul 2020 07:01  -  28 Jul 2020 07:00  --------------------------------------------------------  IN:    Oral Fluid: 560 mL  Total IN: 560 mL    OUT:    Indwelling Catheter - Urethral: 150 mL  Total OUT: 150 mL    Total NET: 410 mL        Drug Dosing Weight  SASKIA ELIAS      PHYSICAL EXAM:  General: Appears well developed, well nourished alert and cooperative.  HEENT: Head; normocephalic, atraumatic.  Eyes: Pupils reactive, cornea wnl.  Neck: Supple, no nodes adenopathy, no NVD or carotid bruit or thyromegaly.  CARDIOVASCULAR: Normal S1 and S2, No murmur, rub, gallop or lift.   LUNGS: No rales, rhonchi or wheeze. Normal breath sounds bilaterally.  ABDOMEN: Soft, nontender without mass or organomegaly. bowel sounds normoactive.  EXTREMITIES: No clubbing, cyanosis or edema. Distal pulses wnl.   SKIN: warm and dry with normal turgor.  NEURO: Alert/oriented x 3/normal motor exam. No pathologic reflexes.    PSYCH: normal affect.        LABS:                        9.9    7.53  )-----------( 141      ( 28 Jul 2020 06:42 )             30.1     07-28    138  |  102  |  17.0  ----------------------------<  89  5.0   |  26.0  |  0.62    Ca    9.3      28 Jul 2020 06:42  Mg     2.1     07-28    TPro  5.9<L>  /  Alb  3.1<L>  /  TBili  0.7  /  DBili  x   /  AST  13  /  ALT  7   /  AlkPhos  77  07-28    SASKIA ELIAS      ASSESSMENT AND PLAN:  In summary, SASKIA ELIAS is an 79y Male with past medical history significant for HTN, HLD, CVA, CAD/MI s/p BENSON to LAD presents with recurrent VT s/p ablation and course complicated by asystolic arrest/TVP and subsequent BIV-ICD; Hypotension improved with NS 500cc; Lasix 40 mg yesterday 2.3 out patient; limited ECHO TDS no clear evidence of effusion pericardial; Prox Aflutter ; CT ABD negative for hematoma and repeat TTE limited negative for effusion     Patient hypotensive this morning.     Plan:  - continue to hold Toprol and all other antihypertensive medications for now  - agree with IVF resuscitation  - would resume Midodrine 20 mg TID  - continue Eliquis and Plavix    Will follow with you.

## 2020-07-29 NOTE — PROGRESS NOTE ADULT - REASON FOR ADMISSION
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
CAD
CHF MI
VT
per hpi
s/p CRT-D implant
VT

## 2020-07-29 NOTE — PROGRESS NOTE ADULT - SUBJECTIVE AND OBJECTIVE BOX
seen for hypotension, vt arrest    no acute complaints  wants to go home  tolerating regular diet     MEDICATIONS  (STANDING):  apixaban 5 milliGRAM(s) Oral two times a day  atorvastatin 20 milliGRAM(s) Oral at bedtime  buPROPion XL . 150 milliGRAM(s) Oral daily  chlorhexidine 2% Cloths 1 Application(s) Topical <User Schedule>  chlorhexidine 4% Liquid 1 Application(s) Topical <User Schedule>  clopidogrel Tablet 75 milliGRAM(s) Oral daily  lidocaine   Patch 1 Patch Transdermal daily  melatonin 5 milliGRAM(s) Oral at bedtime  midodrine. 10 milliGRAM(s) Oral three times a day  nystatin Cream 1 Application(s) Topical two times a day  saccharomyces boulardii 250 milliGRAM(s) Oral two times a day  senna 2 Tablet(s) Oral at bedtime    MEDICATIONS  (PRN):  acetaminophen   Tablet .. 650 milliGRAM(s) Oral every 6 hours PRN Temp greater or equal to 38C (100.4F), Moderate Pain (4 - 6)  traMADol 25 milliGRAM(s) Oral four times a day PRN Severe Pain (7 - 10)      Allergies    No Known Allergies      MISCELLANEOUS:    Vital Signs Last 24 Hrs  T(C): 36.4 (29 Jul 2020 05:55), Max: 36.6 (28 Jul 2020 15:45)  T(F): 97.5 (29 Jul 2020 05:55), Max: 97.9 (28 Jul 2020 15:45)  HR: 68 (29 Jul 2020 10:30) (60 - 80)  BP: 112/56 (29 Jul 2020 10:30) (102/45 - 144/80)  BP(mean): 75 (29 Jul 2020 10:30) (75 - 75)  RR: 18 (29 Jul 2020 10:30) (18 - 20)  SpO2: 91% (29 Jul 2020 10:30) (91% - 94%)    PHYSICAL EXAM:    GENERAL: NAD  CHEST/LUNG: Clear to percussion bilaterally  HEART: Regular rate and rhythm; S1 S2  ABDOMEN: Soft,  Bowel sounds present  EXTREMITIES:  no edema       LABS:                        9.9    7.53  )-----------( 141      ( 28 Jul 2020 06:42 )             30.1     07-28    138  |  102  |  17.0  ----------------------------<  89  5.0   |  26.0  |  0.62    Ca    9.3      28 Jul 2020 06:42  Mg     2.1     07-28    TPro  5.9<L>  /  Alb  3.1<L>  /  TBili  0.7  /  DBili  x   /  AST  13  /  ALT  7   /  AlkPhos  77  07-28          CAPILLARY BLOOD GLUCOSE            RADIOLOGY & ADDITIONAL TESTS:

## 2020-07-29 NOTE — PROGRESS NOTE ADULT - PROVIDER SPECIALTY LIST ADULT
Cardiology
Critical Care
Electrophysiology
Hospitalist
Infectious Disease
Infectious Disease
Critical Care
Critical Care
Cardiology

## 2020-07-29 NOTE — PROGRESS NOTE ADULT - SUBJECTIVE AND OBJECTIVE BOX
[place] [facility] - [facility]                                   [address] [place] [address]                                                      PHONE: [phone]                                                         FAX: [phone]  [email or web address]  ---------------------------------------------------------------------------------------------------------------------------------    Overnight events/patient complaints:  NAD feeling well and wants to go home     No Known Allergies    MEDICATIONS  (STANDING):  apixaban 5 milliGRAM(s) Oral two times a day  atorvastatin 20 milliGRAM(s) Oral at bedtime  buPROPion XL . 150 milliGRAM(s) Oral daily  chlorhexidine 2% Cloths 1 Application(s) Topical <User Schedule>  chlorhexidine 4% Liquid 1 Application(s) Topical <User Schedule>  clopidogrel Tablet 75 milliGRAM(s) Oral daily  lidocaine   Patch 1 Patch Transdermal daily  melatonin 5 milliGRAM(s) Oral at bedtime  midodrine. 10 milliGRAM(s) Oral three times a day  nystatin Cream 1 Application(s) Topical two times a day  saccharomyces boulardii 250 milliGRAM(s) Oral two times a day  senna 2 Tablet(s) Oral at bedtime    MEDICATIONS  (PRN):  acetaminophen   Tablet .. 650 milliGRAM(s) Oral every 6 hours PRN Temp greater or equal to 38C (100.4F), Moderate Pain (4 - 6)  traMADol 25 milliGRAM(s) Oral four times a day PRN Severe Pain (7 - 10)      Vital Signs Last 24 Hrs  T(C): 36.4 (29 Jul 2020 05:55), Max: 36.6 (28 Jul 2020 10:00)  T(F): 97.5 (29 Jul 2020 05:55), Max: 97.9 (28 Jul 2020 10:00)  HR: 69 (29 Jul 2020 08:44) (60 - 80)  BP: 144/80 (29 Jul 2020 08:44) (66/43 - 144/80)  BP(mean): --  RR: 18 (29 Jul 2020 08:44) (18 - 20)  SpO2: 94% (29 Jul 2020 08:44) (93% - 96%)  ICU Vital Signs Last 24 Hrs  SASKIA ELIAS  I&O's Detail    I&O's Summary    Drug Dosing Weight  SASKIA ELIAS      PHYSICAL EXAM:  General: Appears well developed, well nourished alert and cooperative.  HEENT: Head; normocephalic, atraumatic.  Eyes: Pupils reactive, cornea wnl.  Neck: Supple, no nodes adenopathy, no NVD or carotid bruit or thyromegaly.  CARDIOVASCULAR: Normal S1 and S2, No murmur, rub, gallop or lift.   LUNGS: No rales, rhonchi or wheeze. Normal breath sounds bilaterally.  ABDOMEN: Soft, nontender without mass or organomegaly. bowel sounds normoactive.  EXTREMITIES: No clubbing, cyanosis or edema. Distal pulses wnl.   SKIN: warm and dry with normal turgor.  NEURO: Alert/oriented x 3/normal motor exam. No pathologic reflexes.    PSYCH: normal affect.        LABS:                        9.9    7.53  )-----------( 141      ( 28 Jul 2020 06:42 )             30.1     07-28    138  |  102  |  17.0  ----------------------------<  89  5.0   |  26.0  |  0.62    Ca    9.3      28 Jul 2020 06:42  Mg     2.1     07-28    TPro  5.9<L>  /  Alb  3.1<L>  /  TBili  0.7  /  DBili  x   /  AST  13  /  ALT  7   /  AlkPhos  77  07-28    SASKIA ELIAS            RADIOLOGY & ADDITIONAL STUDIES:    INTERPRETATION OF TELEMETRY (personally reviewed):    In summary, SASKIA ELIAS is an 79y Male with past medical history significant for HTN, HLD, CVA, CAD/MI s/p BENSON to LAD presents with recurrent VT s/p ablation and course complicated by asystolic arrest/TVP and subsequent BIV-ICD; Hypotension improved with NS 500cc; Lasix 40 mg yesterday 2.3 out patient; limited ECHO TDS no clear evidence of effusion pericardial; Prox Aflutter ; CT ABD negative for hematoma and repeat TTE limited negative for effusion; BP stable over 24 hours    Continue current CV medications   DC planning rom CV standpoint

## 2020-07-29 NOTE — PROGRESS NOTE ADULT - ASSESSMENT
79 years old male with history of stroke, depression, hypertension, and coronary artery disease who was previously admitted for ventricular tachycardia found to have cardiomyopathy and required percutaneous coronary intervention to the left anterior descending artery and was discharged home with an external defibrillator vest. The patient presented with episodes of syncope and alarms from the defibrillator vest found to have ventricular tachycardia. The patient was placed on antiarrhythmic medications and underwent VT ablation. He subsequently had cardiac arrest and required intubation with initiation of vasopressor support and empiric antibiotics. An AICD was implanted and the patient subsequently extubated.      hypotension: improved     intolerant of low dose toprol    hold flomax as well    restart midodrine, s/p 500cc NS bolus    1) Ventricular tachycardia / Cardiac arrest   - Status post VT ablation and AICD implantation  - Cardiology and Electrophysiology consultation noted  - Metoprolol and Lisinopril on hold due to hypotension  - Levophed weaned off and now on Midodrine    2) Acute Respiratory Failure / Suspected Aspiration  - s/p extubation  - Finished Zosyn   - ID follow up noted      3) CAD   - s/p PCI  - STOP Aspirin to avoid Triple therapy   - c/w Plavix and Lipitor  - Lisinopril and Metoprolol on hold due to hypotension    4) New Onset Atrial Flutter  - Rate controlled  - Continue Aspirin   - eliquis started as cleared by Electrophysiology    5) Thrombocytopenia  - HIT ruled out  - No signs of bleeding  - Monitor     6) CARLA   - Resolved  - Monitor renal function    7) Urinary Retention  - passed TOV  - Continue Knox's Catheter     8) Left Inguinal Hernia  - Outpatient follow up     9) Depression   - Continue Bupropion     10) Hypokalemia / Hypomagnesemia   - Replaced    11) Dysphagia  - Speech therapy recommending Mechanical Soft with Thin Liquids however patient wants to take Puree instead of Mechanical Soft.         stable for discharge  awaiting home health aide set up

## 2020-08-04 NOTE — PATIENT PROFILE ADULT - NSASFALLNEEDSASSISTWITH_GEN_A_NUR
----- Message from Марина Arias sent at 8/4/2020  9:24 AM CDT -----  Contact: valerie /rosa [facility]  ref# [id number]  Valerie states they are f/u on the standard written order form. Please fax to . Thank you     standing/walking/toileting

## 2020-08-24 PROBLEM — Z86.79 HISTORY OF HYPERTENSION: Status: RESOLVED | Noted: 2020-01-01 | Resolved: 2020-01-01

## 2020-08-24 PROBLEM — Z98.61 HISTORY OF PERCUTANEOUS CORONARY INTERVENTION: Status: RESOLVED | Noted: 2020-01-01 | Resolved: 2020-01-01

## 2020-08-24 PROBLEM — Z86.79 HISTORY OF CORONARY ARTERY DISEASE: Status: RESOLVED | Noted: 2020-01-01 | Resolved: 2020-01-01

## 2020-08-24 PROBLEM — Z86.74 HISTORY OF CARDIAC ARREST: Status: RESOLVED | Noted: 2020-01-01 | Resolved: 2020-01-01

## 2020-08-24 PROBLEM — Z86.59 HISTORY OF DEPRESSION: Status: RESOLVED | Noted: 2020-01-01 | Resolved: 2020-01-01

## 2020-08-24 PROBLEM — Z86.79 HISTORY OF CARDIOMYOPATHY: Status: RESOLVED | Noted: 2020-01-01 | Resolved: 2020-01-01

## 2020-08-24 PROBLEM — Z86.79 HISTORY OF ATRIAL FIBRILLATION: Status: RESOLVED | Noted: 2020-01-01 | Resolved: 2020-01-01

## 2020-08-24 PROBLEM — Z87.09 HISTORY OF ACUTE RESPIRATORY FAILURE: Status: RESOLVED | Noted: 2020-01-01 | Resolved: 2020-01-01

## 2020-08-24 PROBLEM — I95.9 HYPOTENSION: Status: ACTIVE | Noted: 2020-01-01

## 2020-08-24 PROBLEM — Z86.79 HISTORY OF VENTRICULAR TACHYCARDIA: Status: RESOLVED | Noted: 2020-01-01 | Resolved: 2020-01-01

## 2020-08-24 PROBLEM — Z86.79 HISTORY OF COMPLETE ATRIOVENTRICULAR BLOCK: Status: RESOLVED | Noted: 2020-01-01 | Resolved: 2020-01-01

## 2020-08-24 PROBLEM — N42.9 PROSTATE DISEASE: Status: ACTIVE | Noted: 2020-01-01

## 2020-10-07 NOTE — ED PROVIDER NOTE - RESPIRATORY, MLM
No retinal tears or retinal detachment seen on clinical exam today. Reviewed the signs and symptoms of retinal tear/retinal detachment and the importance of calling for prompt evaluation should there be increasing floaters, new flashing lights, or decreasing peripheral vision in either eye at any time. Observation recommended. Breath sounds clear and equal bilaterally.

## 2020-10-11 NOTE — PATIENT PROFILE ADULT - NSPRONUTRITIONRISK_GEN_A_NUR
Significant decrease of oral intake greater than 3 days prior to admission/Pressure injury stage 2 or greater Pressure injury stage 2 or greater

## 2020-10-11 NOTE — H&P ADULT - NSICDXPASTMEDICALHX_GEN_ALL_CORE_FT
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

## 2020-10-11 NOTE — ED PROVIDER NOTE - CLINICAL SUMMARY MEDICAL DECISION MAKING FREE TEXT BOX
78 y/o M with COPD and extensive cardiac history presents in respiratory distress, mildly hypotensive with history of vomiting x 2 days, improved with NRB, solu-medrol, mag and duo-neb. Will try on bipap for work of breathing, afebrile rectally, symmetrical pulses bilaterally, denies abdominal or back pain, EKG shows ST depressions, will consult cardiology, evaluate for infectious etiology, treat for COPD exacerbation as well and plan for admission.

## 2020-10-11 NOTE — ED PROVIDER NOTE - OBJECTIVE STATEMENT
80 y/o M with PMH HTN, COPD, CVA, HLD, s/p VT ablation presents complaining of worsening shortness of breath over the past two days after receiving his flu shot. He had his second VT ablation on 9/29 at [facility] and was doing well until 2 days ago. He states he initially started with nausea and vomiting 2 days ago, he felt weak and then progressed to SOB and did complain of chest pain. He called 911 today when he was having severe trouble breathing. EMS states that when they arrived he was grey in color, in severe respiratory distress, satting 84% with BP in the 70's systolic. Due to the CPAP mask not fitting him well, he was put on NRB and improved to 92%, received 125 mg solumedrol, 1 duo-neb and 2 gm Mag due to tight breath sounds and poor inspiratory effort. He improved with 500 cc NS IVF as well. Patient lives alone and does not use oxygen. He also has been hiccuping for the past 2 days. He is full code per him.

## 2020-10-11 NOTE — H&P ADULT - PROBLEM SELECTOR PLAN 1
Admit to SDU, cont. supplemental 02 and titrate down as tolerated, Duonebs, spirometer, Zosyn for HCAP, no hx of MRSA, f/u blood cx, chest PT, probiotics, OOB, Fall risk, ID eval pending cultures/response to above

## 2020-10-11 NOTE — H&P ADULT - PROBLEM SELECTOR PLAN 7
s/p multiple ablations, no reports of AICD discharge. Avoid increasing Midodrine if possible. Monitor lytes and replete as indicated. Cardio f/u as patient missed his last appointment

## 2020-10-11 NOTE — H&P ADULT - ASSESSMENT
80 y/o male with hypoxic respiratory failure due to likely HCAP, severe sepsis, severe ICM w/ EF <20%, CAD s/p recent PCI, VT s/p multiple ablations, s/p AICD/CRT, CVA, chronic hypotension, stage 4 right buttock pressure ulcer, right plantar foot ulcer, depression, Afib on eliquis, old CVA, wheelchair bound

## 2020-10-11 NOTE — H&P ADULT - PROBLEM SELECTOR PLAN 3
MAP/SBP above goal, f/u repeat lactate, cap refill < 3 seconds. Not given full 30ml/KG due to severe LV dysfunction and risk of pulm edema further worsening respiratory failure. Cont. Midodrine. F/U cultures, cont. empiric abx for now. Trend WBC/Bands/Temp curve.

## 2020-10-11 NOTE — CONSULT NOTE ADULT - SUBJECTIVE AND OBJECTIVE BOX
[facility], THE [facility]ART [facility]ENTER                [address] [place] [address]                                     PHONE: [phone]                                       FAX: [phone]  [email or web address]      Reason for Consult: dyspnea     HPI:  Patient is a 80 y/o man with hypertension, hyperlipidemia, CVA, and recent prolonged hospitalization at [facility] for coronary artery disease, status post myocardial infarction, status post LAD PCI, ischemic cardiomyopathy with an EF of less than 20%, recurrent ventricular tachycardia status post VT ablation, asystolic arrest requiring temporary pacing wire, status post Medtronic biventricular ICD on 2020 and paroxysmal typical atrial flutter, subsequently initiated on full anticoagulation with Eliquis, who underwent comprehensive electrophysiologic study and radiofrequency ablation of his atrial flutter circuit 2020 who now presents with with dyspnea after his flu shot found to have hypoxic respiratory failure.     PAST MEDICAL & SURGICAL HISTORY:  Stroke  Osteoarthritis  Hyperlipidemia  Depression  HTN (hypertension)  S/P stroke due to cerebrovascular disease    PREVIOUS DIAGNOSTIC TESTING:      Echo 20   Left ventricle internal cavity size normal.  Global LV systolic function severely decreased.  EF less than 20%. Grade 1 diastolic dysfunction.  RV systolic function normal.  Right ventricular size normal.  Mild to moderately large left atrium.  Normal right atrial size.  No pericardial effusion.  Moderate MR.  Mild TR. No AR.  Sclerotic aortic valve with normal opening.  Aortic root 4.1 cm.  Dilated ascending aorta 4.4 cm. Pulmonary artery not well seen.     Cardiac catheterization 20  Ostial LAD PCI.  Ostial LAD stenosis 90%.  Left dominant coronary circulation.  Left main minor luminal irregularities.  Circumflex minor luminal irregularities.  RCA minor luminal irregularities.     Biventricular ICD interrogation 8/3/20   Normal functioning Medtronic dual-chamber biventricular ICD.  Implant date 2020 at [facility].  Pacing mode DDD 60.  Review of the arrhythmia log reveals an 18-hour episode of atrial flutter with an atrial tachycardia cycle length of 240 ms on 2020 at 5:47 PM.  No further episodes of SVT.  Biventricular paced 98.6%.  Atrial paced 21.5%.  Normal pacing and sensing of atrial, right ventricular, and left ventricular leads.    MEDICATIONS  (STANDING):  azithromycin  IVPB 500 milliGRAM(s) IV Intermittent once  cefTRIAXone   IVPB 1000 milliGRAM(s) IV Intermittent once  midodrine. 10 milliGRAM(s) Oral three times a day  sodium chloride 0.9%. 1000 milliLiter(s) (100 mL/Hr) IV Continuous <Continuous>    MEDICATIONS  (PRN):    FAMILY HISTORY:  Family history unknown    SOCIAL HISTORY:  CIGARETTES: denies   ALCOHOL: denies     ROS: Negative other than as mentioned in HPI.    Vital Signs Last 24 Hrs  T(C): 36.7 (11 Oct 2020 21:06), Max: 36.8 (11 Oct 2020 18:09)  T(F): 98.1 (11 Oct 2020 21:06), Max: 98.3 (11 Oct 2020 18:09)  HR: 84 (11 Oct 2020 21:06) (76 - 89)  BP: 96/58 (11 Oct 2020 21:06) (84/50 - 96/58)  BP(mean): --  RR: 17 (11 Oct 2020 21:06) (17 - 22)  SpO2: 99% (11 Oct 2020 21:06) (91% - 99%)    PHYSICAL EXAM:  General: Appears well developed, well nourished alert and cooperative.  HEENT: Head; normocephalic, atraumatic. sclera anicteric, MMM, no JVD, neck is supple   CARDIOVASCULAR; 1/6 JERARDO, no rub, gallop or lift. Normal S1 and S2.  LUNGS; No rales, rhonchi or wheeze. Normal breath sounds bilaterally.  ABDOMEN ; Soft, nontender without mass or organomegaly. bowel sounds normoactive.  EXTREMITIES; No clubbing, cyanosis or edema. Distal pulses wnl. ROM normal.  SKIN; warm and dry with normal turgor.  NEURO; Alert/oriented x 3/normal motor exam.     PSYCH; normal affect.        I&O's Detail      LABS:                        11.9   5.10  )-----------( 233      ( 11 Oct 2020 18:27 )             37.7     10-11    137  |  98  |  34.0<H>  ----------------------------<  133<H>  4.7   |  26.0  |  1.04    Ca    9.0      11 Oct 2020 18:27  Mg     2.2     10-11    TPro  5.8<L>  /  Alb  3.2<L>  /  TBili  1.2  /  DBili  x   /  AST  15  /  ALT  8   /  AlkPhos  79  10-11    CARDIAC MARKERS ( 11 Oct 2020 18:27 )  x     / <0.01 ng/mL / x     / x     / x          PT/INR - ( 11 Oct 2020 18:27 )   PT: 14.6 sec;   INR: 1.27 ratio         PTT - ( 11 Oct 2020 18:27 )  PTT:33.5 sec  Urinalysis Basic - ( 11 Oct 2020 18:52 )    Color: Yellow / Appearance: Clear / S.020 / pH: x  Gluc: x / Ketone: Trace  / Bili: Small / Urobili: 8 mg/dL   Blood: x / Protein: 30 mg/dL / Nitrite: Negative   Leuk Esterase: Small / RBC: 3-5 /HPF / WBC 3-5   Sq Epi: x / Non Sq Epi: Occasional / Bacteria: Few      RADIOLOGY & ADDITIONAL STUDIES: [facility], [facility]                [address] [place] [address]                                     PHONE: [phone]                                       FAX: [phone]  [email or web address]      Reason for Consult: dyspnea     HPI:  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, and recent prolonged hospitalization at [facility] for coronary artery disease, status post myocardial infarction, status post LAD PCI, ischemic cardiomyopathy with an EF of less than 20%, recurrent ventricular tachycardia status post VT ablation, asystolic arrest requiring temporary pacing wire, status post Medtronic biventricular ICD on 2020 and paroxysmal typical atrial flutter, subsequently initiated on full anticoagulation with Eliquis, who underwent comprehensive electrophysiologic study and radiofrequency ablation of his atrial flutter circuit 2020 who now presents with dyspnea after his flu shot found to have hypoxic respiratory failure requiring transient NIV now on facemask. Patient reports that the episode was preceded by excessive hiccuping, currently resolved.     PAST MEDICAL & SURGICAL HISTORY:  Stroke  Osteoarthritis  Hyperlipidemia  Depression  HTN (hypertension)  S/P stroke due to cerebrovascular disease    PREVIOUS DIAGNOSTIC TESTING:      Echo 20: Left ventricle internal cavity size normal.  Global LV systolic function severely decreased.  EF less than 20%. Grade 1 diastolic dysfunction.  RV systolic function normal.  Right ventricular size normal. Mild to moderately large left atrium.  Normal right atrial size.  No pericardial effusion.  Moderate MR.  Mild TR. No AR.  Sclerotic aortic valve with normal opening.  Aortic root 4.1 cm.  Dilated ascending aorta 4.4 cm. Pulmonary artery not well seen.     Cardiac catheterization 20: Ostial LAD PCI.  Ostial LAD stenosis 90%.  Left dominant coronary circulation.  Left main minor luminal irregularities.  Circumflex minor luminal irregularities.  RCA minor luminal irregularities.     Biventricular ICD interrogation 8/3/20: Normal functioning Medtronic dual-chamber biventricular ICD.  Implant date 2020 at [facility].  Pacing mode DDD 60.  Review of the arrhythmia log reveals an 18-hour episode of atrial flutter with an atrial tachycardia cycle length of 240 ms on 2020 at 5:47 PM.  No further episodes of SVT.  Biventricular paced 98.6%.  Atrial paced 21.5%.  Normal pacing and sensing of atrial, right ventricular, and left ventricular leads.    MEDICATIONS  (STANDING):  azithromycin  IVPB 500 milliGRAM(s) IV Intermittent once  cefTRIAXone   IVPB 1000 milliGRAM(s) IV Intermittent once  midodrine. 10 milliGRAM(s) Oral three times a day  sodium chloride 0.9%. 1000 milliLiter(s) (100 mL/Hr) IV Continuous <Continuous>    MEDICATIONS  (PRN):    FAMILY HISTORY:  Family history unknown    SOCIAL HISTORY:  CIGARETTES: denies presently, former   ALCOHOL: denies     ROS: Negative other than as mentioned in HPI.    Vital Signs Last 24 Hrs  T(C): 36.7 (11 Oct 2020 21:06), Max: 36.8 (11 Oct 2020 18:09)  T(F): 98.1 (11 Oct 2020 21:06), Max: 98.3 (11 Oct 2020 18:09)  HR: 84 (11 Oct 2020 21:06) (76 - 89)  BP: 96/58 (11 Oct 2020 21:06) (84/50 - 96/58)  BP(mean): --  RR: 17 (11 Oct 2020 21:06) (17 - 22)  SpO2: 99% (11 Oct 2020 21:06) (91% - 99%)    PHYSICAL EXAM:  General: edentulous, chronically ill appearing   HEENT: Head; normocephalic, atraumatic. sclera anicteric, MMM, no JVD, neck is supple   CARDIOVASCULAR; 2/6 JERARDO, no rub, gallop or lift. Normal S1 and S2.  LUNGS; No rales, rhonchi or wheeze. Normal breath sounds bilaterally.  ABDOMEN ; Soft, nontender without mass or organomegaly. bowel sounds normoactive.  EXTREMITIES; No clubbing, cyanosis or edema. Distal pulses wnl. ROM normal.  SKIN; warm and dry with normal turgor.  NEURO; Alert/oriented x 3/normal motor exam.     PSYCH; normal affect.        I&O's Detail      LABS:                        11.9   5.10  )-----------( 233      ( 11 Oct 2020 18:27 )             37.7     10-11    137  |  98  |  34.0<H>  ----------------------------<  133<H>  4.7   |  26.0  |  1.04    Ca    9.0      11 Oct 2020 18:27  Mg     2.2     10-11    TPro  5.8<L>  /  Alb  3.2<L>  /  TBili  1.2  /  DBili  x   /  AST  15  /  ALT  8   /  AlkPhos  79  10-11    CARDIAC MARKERS ( 11 Oct 2020 18:27 )  x     / <0.01 ng/mL / x     / x     / x          PT/INR - ( 11 Oct 2020 18:27 )   PT: 14.6 sec;   INR: 1.27 ratio         PTT - ( 11 Oct 2020 18:27 )  PTT:33.5 sec  Urinalysis Basic - ( 11 Oct 2020 18:52 )    Color: Yellow / Appearance: Clear / S.020 / pH: x  Gluc: x / Ketone: Trace  / Bili: Small / Urobili: 8 mg/dL   Blood: x / Protein: 30 mg/dL / Nitrite: Negative   Leuk Esterase: Small / RBC: 3-5 /HPF / WBC 3-5   Sq Epi: x / Non Sq Epi: Occasional / Bacteria: Few      RADIOLOGY & ADDITIONAL STUDIES:

## 2020-10-11 NOTE — ED ADULT TRIAGE NOTE - CHIEF COMPLAINT QUOTE
pt BIBA as per EMS pt co difficulty breathing, wheezing and hypotensive. Asper pt he is usually runs low on BP. MD Citarrella at pts bedside.

## 2020-10-11 NOTE — ED ADULT NURSE NOTE - CHPI ED NUR SYMPTOMS NEG
no hemoptysis/no body aches/no diaphoresis/no chest pain/no chills/no cough/no headache/no edema/no fever

## 2020-10-11 NOTE — ED ADULT NURSE NOTE - NURSING ED PRESSURE ULCER AREA 3
suspected recurrent aspiration with the history of gastroparesis  Reflux lifestyle changes and Gastroparesis reviewed with patient.  Gastroparesis diet discussed.  I discussed with nurse regarding obtaining dietary consult for gastroparesis diet, keeping head of bed elevated at all times, , patient has been compliant with this.      increased Reglan to 10 mg 4 times a day, tolerated  Discussed case with pulmonary   pt wnats to cont tx      UGIS to assess for anastomotic stricture or esophageal pathology, neg      Additionally, I spoke the patient's gastroenterologist Dr Chavez, they were planning on possible endoscopy once patient's respiratory status improves. Does not feel that they can manage differently patient is transferred there for GI reasons.        Chronic back pain associated with narcotic use resulting in likely gastroparesis in association with constipation.    if aspiration cont, may benefit from J tube placement for feeding and she would like to cont eating for now    inc constipation, mg citrate prn left/right

## 2020-10-11 NOTE — H&P ADULT - HISTORY OF PRESENT ILLNESS
80 y/o male with history of CAD s/p PCI 6/20, ICM with EF <20%, multiple episodes of VT requiring VT ablation, AICD and CRT, most recent was at [facility] on 9/29 where he said the ablation went without any complications and he was sent home, Depression, CVA, severe OA wheelchair bound, right buttock Stage 4 ulcer being care for by wound care, chronic hypotension with SBP 80-90 on Midodrine, Afib on eliquis. Patient BIBEMS tonight after he called for increase SOB and fatigue. He states his symptoms started on Friday and got progressively worse. Denies obvious sick contacts, travel, CP, HA, fever, chills or diarrhea. Said he felt nauseous early this morning and had 2 episodes of nonbloody nonbilious vomit. Denies any dysphagia symptoms. EMS noted SBP in 70s, and hypoxia in 80s. Placed on NRB, given nebs, mag, steroids, and 500ml bolus. In the ED transitioned to NC, code sepsis called for tachypnea, hypoxia, bandemia, and suspected PNA as source of infection. Given an additional 1l NS in ED as well as scheduled dose of Midodrine, and BP at baseline now. Did not get full 30ml/kg with EF <20 and risk of pulmonary edema and worsening respiratory failure leading to likely intubation. Pan cultured, given Abx. Also noted to have stage 4 ulcer over right buttock and skin ulcer on medial plantar aspect of right foot.

## 2020-10-11 NOTE — ED ADULT NURSE NOTE - NSIMPLEMENTINTERV_GEN_ALL_ED
Implemented All Fall with Harm Risk Interventions:  [place] to call system. Call bell, personal items and telephone within reach. Instruct patient to call for assistance. Room bathroom lighting operational. Non-slip footwear when patient is off stretcher. Physically safe environment: no spills, clutter or unnecessary equipment. Stretcher in lowest position, wheels locked, appropriate side rails in place. Provide visual cue, wrist band, yellow gown, etc. Monitor gait and stability. Monitor for mental status changes and reorient to person, place, and time. Review medications for side effects contributing to fall risk. Reinforce activity limits and safety measures with patient and family. Provide visual clues: red socks.

## 2020-10-12 NOTE — CHART NOTE - NSCHARTNOTEFT_GEN_A_CORE
Patient with increased WOB, tachypneic c/o nausea and had 1 episode of vomiting, Denies CP, fever, chills. Patient on exam with diffuse rales. BP 96/59. Patient with risk of hypotension with IV lasix. Will DC IVF, start on HFNC due to concern for aspiration. Zofran given, Scheduled dose of PO midodrine given. MICU consulted as patient with high risk of rapid decompensation with complex history and may need intubation/pressors. WIll repeat CXR. Discussed with Nurse, MICU PA, Respiratory therapist and Patient. Palliative care consult to assist with GOC as patient wishes to remain full code with end stage cardiomyopathy.

## 2020-10-12 NOTE — PROGRESS NOTE ADULT - ASSESSMENT
1: Cardiac Arrest (Asystole, V fib, Vtach, PEA)  2: Acute hypoxic hypercapnic respiratory failure   3: Aspiration pneumonitis/ pneumonia   4: Severe ARDS   5: Cardiogenic and distributive shock   6: Lactic acidosis   7: CARLA   8: Anoxic encephalopathy   9: ? SBO/ Ileus     Patient seen and examined   Moribund prognosis   DNR, No escalation of pressors (GOC)     Neuro:   Pain Mx:none  Sedation/Anxiolytic: none  Daily SAT  q2 neuro checks  Hypothermia protocol with target temp 36     Cardiovascular:   MAP Target: 60-65  C/w Current dose of Levophed and Epi with bicarb infusion at 200 per hour with midodrine   Stat TTE   Trend Lactate     Resp/Chest:   On Volume AC ; PLS; Vent bundle  High PEEP with 100% FiO2  Not a candidate for Prone and Paralying   Trend Abgs for now for further vent adjustment     Gi/Nutrition:   Diet: NPo   OGT to intermittent suction (drained 1800 cc upon insertion)   IV PPI  Bowel Regimen as needed    ID:   Probable contaminated lines and aspiration hence adding one time vancomycin to current Zosyn dosing; I would get Procalcitonin tomorrow     Nephro/Electrolyte/Acid-Base:   Would add Cardura tomorrow   Avoid nephrotoxic agents  Strict I&O with Cr monitoring   Medications adjusted for   Close Electrolyte monitoring and correction as needed     Endocrinology:   blood sugar q4      Haem/Oncology:   Mechanical and Chemical DVT ppx  Full dose Apixaban for now to be continued     Lines/ Tubes/ Catheters/ devices:   ETT: Size 8 23 at lip placed on 10/12  OGT/NGT:  placed on 10/12  TLC: left femoral (to be changed based on clinical recovery tomorrow)   A-line: Left femoral   [place]: Post code    At risk for further MODS and SCD

## 2020-10-12 NOTE — DISCHARGE NOTE NURSING/CASE MANAGEMENT/SOCIAL WORK - NSDCFUADDAPPT_GEN_ALL_CORE_FT
FOLLOW UP VISIT PULMONOLOGIST:  DR WISAM ORTIZ  [address] [facility], [facility] [address] [address] [phone]  OCTOBER 19TH 2020 at 10:30 AM

## 2020-10-12 NOTE — CONSULT NOTE ADULT - SUBJECTIVE AND OBJECTIVE BOX
[facility]  INFECTIOUS DISEASES AND INTERNAL MEDICINE at [place]  =======================================================  Andrey Johnson MD  Diplomates American Board of Internal Medicine and Infectious Diseases  Tel  [phone]  Fax [phone]  =======================================================    [id number]  SASKIA ELIAS   HPI:   This 78 y/o male with history of CAD s/p PCI 6/20, ICM with EF <20%, multiple episodes of VT requiring VT ablation, AICD and CRT, most recent was at [facility] on 9/29 where he said the ablation went without any complications and he was sent home, Depression, CVA, severe OA wheelchair bound, right buttock Stage 4 ulcer being care for by wound care, chronic hypotension with SBP 80-90 on Midodrine, Afib on eliquis. Patient BIBEMS tonight after he called for increase SOB and fatigue. He states his symptoms started on Friday and got progressively worse. Denies obvious sick contacts, travel, CP, HA, fever, chills or diarrhea. Said he felt nauseous early this morning and had 2 episodes of nonbloody nonbilious vomit. Denies any dysphagia symptoms. EMS noted SBP in 70s, and hypoxia in 80s. Placed on NRB, given nebs, mag, steroids, and 500ml bolus. In the ED transitioned to NC, code sepsis called for tachypnea, hypoxia, bandemia, and suspected PNA as source of infection. Given an additional 1l NS in ED as well as scheduled dose of Midodrine, and BP at baseline now. Did not get full 30ml/kg with EF <20 and risk of pulmonary edema and worsening respiratory failure leading to likely intubation. Pan cultured, given Abx. Also noted to have stage 4 ulcer over right buttock and skin ulcer on medial plantar aspect of right foot.  (11 Oct 2020 22:18)    patient was found to have bilateral airspace disease on imaging.  Due to his decompensation, he was transferred from the Medical service to the MICU.   patient can provide very little information.   RVP and COVID PCR is negative    I have personally reviewed the labs and data; pertinent labs and data are listed in this note; please see below.   =======================================================  Past Medical & Surgical Hx:  =====================  PAST MEDICAL & SURGICAL HISTORY:  Afib  Ventricular tachycardia  Congestive heart failure due to cardiomyopathy  CAD in native artery  Stroke  Osteoarthritis  Hyperlipidemia  Depression  HTN (hypertension)  S/P stroke due to cerebrovascular disease    Problem List:  ==========  HEALTH ISSUES - PROBLEM Dx:  Pressure injury of right buttock, stage 4  Hyperlipidemia, unspecified hyperlipidemia type  Atrial fibrillation, unspecified type  Ventricular tachycardia  Congestive heart failure due to cardiomyopathy  CAD in native artery  Severe sepsis  HCAP (healthcare-associated pneumonia)  Acute respiratory failure with hypoxia      Social Hx:  =======  no toxic habits currently    FAMILY HISTORY:  Family history unknown    no significant family history of immunosuppressive disorders in mother or father   =======================================================  REVIEW OF SYSTEMS:  CONSTITUTIONAL:  No Fever or chills  HEENT:  No diplopia or blurred vision.  No earache, sore throat or runny nose.  CARDIOVASCULAR:  No pressure, squeezing, strangling, tightness, heaviness or aching about the chest, neck, axilla or epigastrium.  RESPIRATORY:  POSITIVE shortness of breath  GASTROINTESTINAL:  No nausea, vomiting or diarrhea.  GENITOURINARY:  No dysuria, frequency or urgency. No Blood in urine  MUSCULOSKELETAL:  no joint aches, no muscle pain  SKIN:  No change in skin, hair or nails.  NEUROLOGIC:  No Headaches, seizures or weakness.  PSYCHIATRIC:  No disorder of thought or mood.  ENDOCRINE:  No heat or cold intolerance  HEMATOLOGICAL:  No easy bruising or bleeding.   =======================================================  Allergies  No Known Allergies  Intolerances    Antibiotics:  piperacillin/tazobactam IVPB.. 3.375 Gram(s) IV Intermittent every 8 hours    Other medications:  acetylcysteine 20%  Inhalation 1 milliLiter(s) Inhalation every 6 hours  ALBUTerol    90 MICROgram(s) HFA Inhaler 1 Puff(s) Inhalation every 4 hours  albuterol/ipratropium for Nebulization 3 milliLiter(s) Nebulizer every 6 hours  apixaban 5 milliGRAM(s) Oral every 12 hours  atorvastatin 20 milliGRAM(s) Oral at bedtime  buPROPion XL . 150 milliGRAM(s) Oral daily  clopidogrel Tablet 75 milliGRAM(s) Oral daily  midodrine. 10 milliGRAM(s) Oral three times a day  norepinephrine Infusion 0.05 MICROgram(s)/kG/Min IV Continuous <Continuous>  saccharomyces boulardii 250 milliGRAM(s) Oral two times a day  tamsulosin 0.4 milliGRAM(s) Oral at bedtime  tiotropium 18 MICROgram(s) Capsule 1 Capsule(s) Inhalation daily   azithromycin  IVPB   255 mL/Hr IV Intermittent (10-11-20 @ 21:48)    cefTRIAXone   IVPB   100 mL/Hr IV Intermittent (10-11-20 @ 21:47)    piperacillin/tazobactam IVPB..   25 mL/Hr IV Intermittent (10-11-20 @ 23:07)   25 mL/Hr IV Intermittent (10-12-20 @ 07:03)      ======================================================  Physical Exam:  ============  T(F): 98.5 (12 Oct 2020 07:00), Max: 98.5 (12 Oct 2020 07:00)  HR: 83 (12 Oct 2020 11:00)  BP: 94/61 (12 Oct 2020 11:00)  RR: 33 (12 Oct 2020 11:00)  SpO2: 100% (12 Oct 2020 11:00) (89% - 100%)  temp max in last 48H T(F): , Max: 98.5 (10-12-20 @ 07:00)Height (cm): 182.9 (10-11-20 @ 17:46)  Weight (kg): 97.5 (10-11-20 @ 17:46)  BMI (kg/m2): 29.1 (10-11-20 @ 17:46)  BSA (m2): 2.2 (10-11-20 @ 17:46)    General:  No acute distress.  Eye: Pupils are equal, round and reactive to light, Extraocular movements are intact, Normal conjunctiva.  HENT: Normocephalic, Oral mucosa is moist, No pharyngeal erythema, No sinus tenderness.  Neck: Supple, No lymphadenopathy.  Respiratory: Lungs with COARSE breath sounds anteriorly  Cardiovascular: Normal rate, Regular rhythm,   Gastrointestinal: Soft, Non-tender, Non-distended, Normal bowel sounds.  Genitourinary: No zayas  Lymphatics: No lymphadenopathy neck,   Musculoskeletal: Normal range of motion,   Integumentary: No rash.  Neurologic: Cranial Nerves II-XII are grossly intact.  Psychiatric: Appropriate mood & affect.    =======================================================  Labs:                        11.6   6.44  )-----------( 194      ( 12 Oct 2020 06:41 )             36.0      10-12    136  |  98  |  52.0<H>  ----------------------------<  119<H>  4.9   |  23.0  |  1.19    Ca    8.9      12 Oct 2020 06:44  Phos  4.2     10-12  Mg     1.9     10-12    TPro  5.8<L>  /  Alb  3.2<L>  /  TBili  1.2  /  DBili  x   /  AST  15  /  ALT  8   /  AlkPhos  79  10-11      Creatinine, Serum: 1.19 mg/dL (10-12-20 @ 06:44)  Creatinine, Serum: 1.04 mg/dL (10-11-20 @ 18:27)    Rapid RVP Result: NotDetec (10-11-20 @ 18:45)  COVID-19 PCR: NotDetec (10-11-20 @ 18:35)    ==========     EXAM:  XR CHEST PORTABLE URGENT 1V                          PROCEDURE DATE:  10/12/2020      INTERPRETATION:  CHEST AP PORTABLE:    History: Shortness of Breath.    Date and time of exam: 10/12/2020 6:35 AM.    Technique: A single AP view of the chest was obtained.    Comparison exam: 10/11/2020 6:22 PM.    Findings:  The heart is not enlarged. No hilar or mediastinal abnormality. There is a pacemaker with 3 leads, unchanged. Bilateral airspace disease unchanged.    Impression:  No change since the prior exam..     LETI WEN M.D., ATTENDING RADIOLOGIST  This document has been electronically signed. Oct 12 2020  9:49AM

## 2020-10-12 NOTE — CONSULT NOTE ADULT - ASSESSMENT
78 yo M with COPD, HCAP severe sepsis and Acute respiratory Failure    COPD/Exacerbation  O2 dependent  ON HI MIHAELA Oxygenation  Duoneb Q6  Severe Sepsis    Acute Respiratory Failure  In setting of HCAP  Zosyn IV ABX 10/11-10/18  BIPAP now HI MIHAELA O2 80%  Labored breathing, Respiratory Distress  Would consider giving Morphine 2mg IVP Q4-6 hrs prn for dyspnea and heart failure  Patient asking to be intubated, if he continues to decline  REC: Ativan 0.5mg IV Q4-6 hrs for anxiety related to fear of dying and severe dyspnea at rest    End stage Cardiomyopathy  EF <20%  AFIB  on Elliquis  Chronic Hypotension- Midodrine and Levophed  S/P Stent PCI 6/2020  Plavix  Ventricular Tachycardia-AICD insitu    GOC  Full Code at this time  HCP completed  Maite Gibbs (sister)   Trial of Intubation- no Tracheostomy or Prolonged Vent support   See GOC documentation  
80 y/o male with history of CAD s/p PCI 6/20, ICM with EF <20%, multiple episodes of VT requiring VT ablation, AICD and CRT, most recent was at [facility] on 9/29 where he said the ablation went without any complications and he was sent home, Depression, CVA, severe OA wheelchair bound, right buttock Stage 4 ulcer being care for by wound care, chronic hypotension with SBP 80-90 on Midodrine, Afib on eliquis. Patient BIBEMS tonight after he called for increase SOB and fatigue. He got 500cc bolus in the field then 1L NS bolus in ER and started on 100cc/hr. He got a total of 2L IVF since he hit the door. Got vancomycin and zosyn in the ER, cultures, started on home meds and admitted to medicine service. Now with acute aspiration event after vomiting leading to acute hypoxic resp failure, requiring HFNC.    Plan discussed with E-ICU attending Dr. Lopez    Problem List:  1) Acute hypoxic respiratory failure  2) aspiration pneumonitis   3) Lobar PNA  4) Severe sepsis   5) CHF    -Transfer to MICU for further management and observation given respiratory status  - Suspect acute aspiration as cause for resp failure, start HFNC 100% 40L and titrate to maintain SpO2 >90%  - Patient does have significant CHF history, however POCUS shows a line predominant lung pattern, hgb is 11, baseline 9, and he has CARLA pointing to patient being on the dryer side  - Will hold off on IV lasix, will hold off on further IVF for now. He may need more IVF as resp status improves, check repeat labs now  - Patient was orally suctioned and NT suctioned at the bedside by myself, with some scant white secretions recovered  - Stat duoneb, chest PT and mucomyst  - Continue these therapies q6 hours  - Zosyn for PNA, got vanco x1 dose, no history of MRSA in past  -NPO except meds for now, check dysphagia screen  - Home medications as ordered once resp status improves, holding morning meds for now  
This 80 y/o male with history of CAD s/p PCI 6/20, ICM with EF <20%, multiple episodes of VT requiring VT ablation, AICD and CRT, most recent was at [facility] on 9/29 where he said the ablation went without any complications and he was sent home, Depression, CVA, severe OA wheelchair bound, right buttock Stage 4 ulcer being care for by wound care, chronic hypotension with SBP 80-90 on Midodrine, Afib on Eliquis Patient BIBEMS tonight after he called for increase SOB and fatigue. He states his symptoms started on Friday and got progressively worse. Denies obvious sick contacts, travel, CP, HA, fever, chills or diarrhea. Said he felt nauseous early this morning and had 2 episodes of nonbloody nonbilious vomit. Denies any dysphagia symptoms. EMS noted SBP in 70s, and hypoxia in 80s. Placed on NRB, given nebs, mag, steroids, and 500ml bolus. In the ED transitioned to NC, code sepsis called for tachypnea, hypoxia, bandemia, and suspected PNA as source of infection. Given an additional 1l NS in ED as well as scheduled dose of Midodrine, and BP at baseline now. Did not get full 30ml/kg with EF <20 and risk of pulmonary edema and worsening respiratory failure leading to likely intubation. Pan cultured, given Abx. Also noted to have stage 4 ulcer over right buttock and skin ulcer on medial plantar aspect of right foot.  (11 Oct 2020 22:18)    patient was found to have bilateral airspace disease on imaging.  Due to his decompensation, he was transferred from the Medical service to the MICU.   patient can provide very little information.   RVP and COVID PCR is negative      Impression:  - bilateral pneumonia  - shortness of breath  - hypoxia    Plan:  - continue empiric Antibiotics:  piperacillin/tazobactam IVPB.. 3.375 Gram(s) IV Intermittent every 8 hours    - follow up on cultures    - continue ICU care     - Will follow with you.   
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, and recent prolonged hospitalization at [facility] for coronary artery disease, status post myocardial infarction, status post LAD PCI, ischemic cardiomyopathy with an EF of less than 20%, recurrent ventricular tachycardia status post VT ablation, asystolic arrest requiring temporary pacing wire, status post Medtronic biventricular ICD on 07/21/2020 and paroxysmal typical atrial flutter, subsequently initiated on full anticoagulation with Eliquis, who underwent comprehensive electrophysiologic study and radiofrequency ablation of his atrial flutter circuit 9/29/2020 who now presents with dyspnea after his flu shot found to have hypoxic respiratory failure requiring transient NIV now on facemask.    Shortness of breath secondary to hypoxic respiratory failure:   - patient bronchospastic on exam and seen laying flat in bed, doubt this presentation is related to acute heart failure  - agree with bronchodilator therapy  - CT chest to r/o consolidation   - will interrogate device to r/o diaphragmatic pacing   - Limited TTE to assess for effusion, albeit unlikely given stable hemodynamics  - continue present home cardiovascular medications    Ischemic cardiomyopathy with chronic HFrEF without decompensation/CAD s/p PCI/AF s/p ablation   - continue plavix, lipitor, metoprolol succinate 25mg and amiodarone  - continue Eliquis   - intermittent hypotension requiring midodrine, order PRN   - telemetry monitoring     Further recommendations pending results of diagnostic testing and clinical course

## 2020-10-12 NOTE — PROCEDURE NOTE - NSPROCDETAILS_GEN_ALL_CORE
connected to a pressurized flush line/all materials/supplies accounted for at end of procedure/hemostasis with direct pressure, dressing applied/Seldinger technique/positive blood return obtained via catheter/sutured in place/ultrasound guidance
patient pre-oxygenated, tube inserted, placement confirmed/connected to ventilator
lumen(s) aspirated and flushed/sterile dressing applied/ultrasound guidance/guidewire recovered/sterile technique, catheter placed

## 2020-10-12 NOTE — PROCEDURE NOTE - NSTRACHPOSTINTU_RESP_A_CORE
Positive end tidal Co2 noted/Appropriate capnography/Breath sounds bilateral/Chest excursion noted/Breath sounds equal/Chest X-Ray

## 2020-10-12 NOTE — PROCEDURE NOTE - ADDITIONAL PROCEDURE DETAILS
tisha placed in setting of shock    not included in cc time
CVC placed in setting of shock     not included in cc time
ETT placed in setting of progressive respiratory distress    Not included in cc time

## 2020-10-12 NOTE — PROGRESS NOTE ADULT - SUBJECTIVE AND OBJECTIVE BOX
[facility]                                   [address] [place] [address]                                                      PHONE: [phone]                                                         FAX: [phone]  [email or web address]  ---------------------------------------------------------------------------------------------------------------------------------    Overnight events/patient complaints: patient seen at bedside. He feels SOB and very weak.       No Known Allergies    MEDICATIONS  (STANDING):  acetylcysteine 20%  Inhalation 1 milliLiter(s) Inhalation every 6 hours  ALBUTerol    90 MICROgram(s) HFA Inhaler 1 Puff(s) Inhalation every 4 hours  albuterol/ipratropium for Nebulization 3 milliLiter(s) Nebulizer every 6 hours  apixaban 5 milliGRAM(s) Oral every 12 hours  atorvastatin 20 milliGRAM(s) Oral at bedtime  buPROPion XL . 150 milliGRAM(s) Oral daily  clopidogrel Tablet 75 milliGRAM(s) Oral daily  midodrine. 10 milliGRAM(s) Oral three times a day  norepinephrine Infusion 0.05 MICROgram(s)/kG/Min (9.14 mL/Hr) IV Continuous <Continuous>  piperacillin/tazobactam IVPB.. 3.375 Gram(s) IV Intermittent every 8 hours  saccharomyces boulardii 250 milliGRAM(s) Oral two times a day  tamsulosin 0.4 milliGRAM(s) Oral at bedtime  tiotropium 18 MICROgram(s) Capsule 1 Capsule(s) Inhalation daily    MEDICATIONS  (PRN):  ondansetron Injectable 4 milliGRAM(s) IV Push every 6 hours PRN Nausea and/or Vomiting      Vital Signs Last 24 Hrs  T(C): 36.9 (12 Oct 2020 07:00), Max: 36.9 (12 Oct 2020 07:00)  T(F): 98.5 (12 Oct 2020 07:00), Max: 98.5 (12 Oct 2020 07:00)  HR: 84 (12 Oct 2020 09:00) (75 - 94)  BP: 97/53 (12 Oct 2020 09:00) (74/51 - 156/101)  BP(mean): 69 (12 Oct 2020 09:00) (58 - 119)  RR: 27 (12 Oct 2020 09:00) (16 - 45)  SpO2: 98% (12 Oct 2020 09:00) (91% - 100%)  ICU Vital Signs Last 24 Hrs  SASKIA ELIAS  I&O's Detail    11 Oct 2020 07:01  -  12 Oct 2020 07:00  --------------------------------------------------------  IN:    IV PiggyBack: 300 mL    Norepinephrine: 18.2 mL    sodium chloride 0.9%: 100 mL  Total IN: 418.2 mL    OUT:  Total OUT: 0 mL    Total NET: 418.2 mL      12 Oct 2020 07:01  -  12 Oct 2020 10:04  --------------------------------------------------------  IN:    IV PiggyBack: 50 mL    Norepinephrine: 36.4 mL  Total IN: 86.4 mL    OUT:  Total OUT: 0 mL    Total NET: 86.4 mL        Drug Dosing Weight  SASKIA ELIAS      PHYSICAL EXAM:  General: Appears well developed, well nourished alert and cooperative.  HEENT: Head; normocephalic, atraumatic.  Eyes: Pupils reactive, cornea wnl.  Neck: Supple, no nodes adenopathy, no NVD or carotid bruit or thyromegaly.  CARDIOVASCULAR: Normal S1 and S2, No murmur, rub, gallop or lift.   LUNGS: bibasilar rales  ABDOMEN: Soft, nontender without mass or organomegaly. bowel sounds normoactive.  EXTREMITIES: No clubbing, cyanosis or edema. Distal pulses wnl.   SKIN: warm and dry with normal turgor.  NEURO: Alert/oriented x 3/normal motor exam. No pathologic reflexes.    PSYCH: normal affect.        LABS:                        11.6   6.44  )-----------( 194      ( 12 Oct 2020 06:41 )             36.0     10-12    136  |  98  |  52.0<H>  ----------------------------<  119<H>  4.9   |  23.0  |  1.19    Ca    8.9      12 Oct 2020 06:44  Phos  4.2     10-12  Mg     1.9     10-12    TPro  5.8<L>  /  Alb  3.2<L>  /  TBili  1.2  /  DBili  x   /  AST  15  /  ALT  8   /  AlkPhos  79  10-11    SASKIA JADA  CARDIAC MARKERS ( 11 Oct 2020 18:27 )  x     / <0.01 ng/mL / x     / x     / x          PT/INR - ( 11 Oct 2020 18:27 )   PT: 14.6 sec;   INR: 1.27 ratio         PTT - ( 11 Oct 2020 18:27 )  PTT:33.5 sec  Urinalysis Basic - ( 11 Oct 2020 18:52 )    Color: Yellow / Appearance: Clear / S.020 / pH: x  Gluc: x / Ketone: Trace  / Bili: Small / Urobili: 8 mg/dL   Blood: x / Protein: 30 mg/dL / Nitrite: Negative   Leuk Esterase: Small / RBC: 3-5 /HPF / WBC 3-5   Sq Epi: x / Non Sq Epi: Occasional / Bacteria: Few        RADIOLOGY & ADDITIONAL STUDIES:    INTERPRETATION OF TELEMETRY (personally reviewed): sinus rhythm     ASSESSMENT AND PLAN:  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, and recent prolonged hospitalization at [facility] for coronary artery disease, status post myocardial infarction, status post LAD PCI, ischemic cardiomyopathy with an EF of less than 20%, recurrent ventricular tachycardia status post VT ablation, asystolic arrest requiring temporary pacing wire, status post Medtronic biventricular ICD on 2020 and paroxysmal typical atrial flutter, subsequently initiated on full anticoagulation with Eliquis, who underwent comprehensive electrophysiologic study and radiofrequency ablation of his atrial flutter circuit 2020 who now presents with dyspnea after his flu shot found to have hypoxic respiratory failure.    Acute Hypoxic Respiratory Failure, Septic Shock, Ischemic Cardiomyopathy, CAD s/p PCI, VT s/p Ablation, Atrial Flutter s/p Ablation -- the patient is currently critically ill on HiFlow nasal cannula and on Levophed for hypotension.  - continue high dose oxygen supplementation and wean if the patient tolerates  - continue Levophed for now and wean if BP improves  - continue to hold all anti-hypertensive and AV jaimee blocking medications  - continue Plavix and Eliquis -- can hold Eliquis if needed for any necessary invasive procedures  - agree with antibiotics  - recommend CT chest without contrast to further evaluated infiltrates seen on CXR  - low threshold to intubate if respiratory status worsen  - check TTE to evaluate for pericardial effusion    Prognosis is guarded. Will follow closely with you.

## 2020-10-12 NOTE — PROGRESS NOTE ADULT - SUBJECTIVE AND OBJECTIVE BOX
Patient is a 79y old  Male who presents with a chief complaint of SOB/Cough  N/V (12 Oct 2020 18:10)      HPI/BRIEF HOSPITAL COURSE: ***    Events last 24 hours:   Earlier this evening, patient had worsening resp failure with increased WOB, hypotension requiring increased levophed-> Intubated (during which patient had vomiting  and probable aspiration event with no vomitus at cords during ETT inserion)-> 10 min following which, patient underwent hypoxia driven bradycardia brief asystole and then Vfib/V tach cardiac arrest with one shock from ICD and 3 shocks of biphasic 360 joules along with 300 of Amio bolus, 6 rounds of Epi with 2 Amp of Ca, one amp of Mag, one amp Lido for this 15 min Cardiac arrest following which ROSC obtained but now requiring increasing amount of Epi and levophed drip with increasing O2 requirement to 100% with PEEP of 15     PAST MEDICAL & SURGICAL HISTORY:  Afib    Ventricular tachycardia    Congestive heart failure due to cardiomyopathy    CAD in native artery    Stroke    Osteoarthritis    Hyperlipidemia    Depression    HTN (hypertension)    S/P stroke due to cerebrovascular disease        Could not obtain ROS due to underlying mentation        Physical Examination:    ICU Vital Signs Last 24 Hrs  T(C): 38.5 (12 Oct 2020 19:00), Max: 38.5 (12 Oct 2020 19:00)  T(F): 101.3 (12 Oct 2020 19:00), Max: 101.3 (12 Oct 2020 19:00)  HR: 60 (12 Oct 2020 19:45) (0 - 109)  BP: 100/55 (12 Oct 2020 19:45) (71/40 - 156/101)  BP(mean): 75 (12 Oct 2020 19:45) (51 - 119)  ABP: 114/42 (12 Oct 2020 19:45) (68/43 - 114/42)  ABP(mean): 55 (12 Oct 2020 19:45) (47 - 55)  RR: 24 (12 Oct 2020 19:45) (13 - 147)  SpO2: 95% (12 Oct 2020 19:45) (86% - 100%)      General: severe distress    Neuro: AA initially but lethargic through the course and now no comatose and no reflexes     HEENT: Pupils equal, reactive to light, Oral mucosa moist; OGT and ETT +    PULM: Clear to auscultation bilaterally except decreased at  bases with accessory muscles of breathing used, no significant adventitious breath sounds     CVS: Regular rhythm and controlled to increased and (then bradycardia prior to cardiac arrest) rate, no murmurs, rubs, or gallops    ABD: Soft, distended, nontender, Hypo active bowel sounds, no CVA tenderness    EXT: b/l LE edema, nontender with pedal pulse palpable     SKIN: Warm and well perfused to cold and clammy , no acute rashes       Medications:  piperacillin/tazobactam IVPB.. 3.375 Gram(s) IV Intermittent every 8 hours  vancomycin  IVPB 1000 milliGRAM(s) IV Intermittent once    EPINEPHrine    Infusion 0.8 MICROgram(s)/kG/Min IV Continuous <Continuous>  midodrine. 10 milliGRAM(s) Oral three times a day  norepinephrine Infusion 0.05 MICROgram(s)/kG/Min IV Continuous <Continuous>    acetylcysteine 20%  Inhalation 1 milliLiter(s) Inhalation every 6 hours  albuterol/ipratropium for Nebulization 3 milliLiter(s) Nebulizer every 6 hours        apixaban 5 milliGRAM(s) Oral every 12 hours  clopidogrel Tablet 75 milliGRAM(s) Oral daily          sodium bicarbonate  Infusion 0.385 mEq/kG/Hr IV Continuous <Continuous>      chlorhexidine 0.12% Liquid 15 milliLiter(s) Oral Mucosa every 12 hours        Mode: AC/ CMV (Assist Control/ Continuous Mandatory Ventilation)  RR (machine): 24  TV (machine): 500  FiO2: 100  PEEP: 15  MAP: 20  PIP: 31      I&O's Detail    11 Oct 2020 07:01  -  12 Oct 2020 07:00  --------------------------------------------------------  IN:    IV PiggyBack: 300 mL    Norepinephrine: 18.2 mL    sodium chloride 0.9%: 100 mL  Total IN: 418.2 mL    OUT:  Total OUT: 0 mL    Total NET: 418.2 mL      12 Oct 2020 07:01  -  12 Oct 2020 20:44  --------------------------------------------------------  IN:    IV PiggyBack: 200 mL    Norepinephrine: 182 mL  Total IN: 382 mL    OUT:    Indwelling Catheter - Urethral (mL): 75 mL    Voided (mL): 350 mL  Total OUT: 425 mL    Total NET: -43 mL            RADIOLOGY/ Microbiology/ Labs: reviewed     I have personally provided 120  minutes of critical care time excluding time spent on separate procedures

## 2020-10-12 NOTE — DISCHARGE NOTE NURSING/CASE MANAGEMENT/SOCIAL WORK - PATIENT PORTAL LINK FT
You can access the FollowMyHealth Patient Portal offered by [facility] by registering at the following website: [email or web address]. By joining [facility]’s FollowMyHealth portal, you will also be able to view your health information using other applications (apps) compatible with our system.

## 2020-10-12 NOTE — CONSULT NOTE ADULT - SUBJECTIVE AND OBJECTIVE BOX
Patient is a 79y old  Male who presents with a chief complaint of SOB/Cough  N/V (11 Oct 2020 22:18)      BRIEF HOSPITAL COURSE: 78 y/o male with history of CAD s/p PCI , ICM with EF <20%, multiple episodes of VT requiring VT ablation, AICD and CRT, most recent was at [facility] on  where he said the ablation went without any complications and he was sent home, Depression, CVA, severe OA wheelchair bound, right buttock Stage 4 ulcer being care for by wound care, chronic hypotension with SBP 80-90 on Midodrine, Afib on eliquis. Patient BIBEMS tonight after he called for increase SOB and fatigue. He got 500cc bolus in the field then 1L NS bolus in ER and started on 100cc/hr. He got a total of 2L IVF since he hit the door. Got vancomycin and zosyn in the ER, cultures, started on home meds and admitted to medicine service.     Events last 24 hours: Around 0430 patient noted to have vomited and afterwards became more hypoxic. Hospitalist responded and noted increased WOB and placed him on HFNC. Patient seen and examined at the bedside, he is an elderly, frail male sitting up in bed. He appears dyspneic. SPO2 94% on 100% 40L HFNC. He states he feels fine, however does not look fine. Admits to vomiting, nasuea. No CP, abdominal pain, fever, chills, headache.     PAST MEDICAL & SURGICAL HISTORY:  Afib    Ventricular tachycardia    Congestive heart failure due to cardiomyopathy    CAD in native artery    Stroke    Osteoarthritis    Hyperlipidemia    Depression    HTN (hypertension)    S/P stroke due to cerebrovascular disease        Review of Systems:  CONSTITUTIONAL: No fever, chills, or fatigue  EYES: No eye pain, visual disturbances, or discharge  ENMT:  No difficulty hearing, tinnitus, vertigo; No sinus or throat pain  NECK: No pain or stiffness  RESPIRATORY: No cough, wheezing, chills or hemoptysis; + shortness of breath  CARDIOVASCULAR: No chest pain, palpitations, dizziness, or leg swelling  GASTROINTESTINAL: No abdominal or epigastric pain. + nausea/vomiting, or hematemesis; No diarrhea or constipation. No melena or hematochezia.  GENITOURINARY: No dysuria, frequency, hematuria, or incontinence  NEUROLOGICAL: No headaches, memory loss, loss of strength, numbness, or tremors  SKIN: No itching, burning, rashes, or lesions   MUSCULOSKELETAL: No joint pain or swelling; No muscle, back, or extremity pain  PSYCHIATRIC: No depression, anxiety, mood swings, or difficulty sleeping      Medications:  piperacillin/tazobactam IVPB.. 3.375 Gram(s) IV Intermittent every 8 hours    midodrine. 10 milliGRAM(s) Oral three times a day  tamsulosin 0.4 milliGRAM(s) Oral at bedtime    acetylcysteine 20%  Inhalation 1 milliLiter(s) Inhalation every 6 hours  ALBUTerol    0.083%. 2.5 milliGRAM(s) Nebulizer once  ALBUTerol    90 MICROgram(s) HFA Inhaler 1 Puff(s) Inhalation every 4 hours  albuterol/ipratropium for Nebulization 3 milliLiter(s) Nebulizer every 6 hours  tiotropium 18 MICROgram(s) Capsule 1 Capsule(s) Inhalation daily    buPROPion XL . 150 milliGRAM(s) Oral daily  ondansetron Injectable 4 milliGRAM(s) IV Push every 6 hours PRN  apixaban 5 milliGRAM(s) Oral every 12 hours  clopidogrel Tablet 75 milliGRAM(s) Oral daily    pantoprazole    Tablet 40 milliGRAM(s) Oral before breakfast    atorvastatin 20 milliGRAM(s) Oral at bedtime    saccharomyces boulardii 250 milliGRAM(s) Oral two times a day      ICU Vital Signs Last 24 Hrs  T(C): 36.7 (12 Oct 2020 04:16), Max: 36.8 (11 Oct 2020 18:09)  T(F): 98.1 (12 Oct 2020 04:16), Max: 98.3 (11 Oct 2020 18:09)  HR: 93 (12 Oct 2020 04:16) (75 - 94)  BP: 96/59 (12 Oct 2020 04:16) (84/50 - 97/62)  BP(mean): 71 (12 Oct 2020 04:16) (70 - 71)  RR: 18 (12 Oct 2020 04:16) (16 - 22)  SpO2: 94% (12 Oct 2020 04:16) (91% - 99%)    I&O's Detail    11 Oct 2020 07:01  -  12 Oct 2020 05:40  --------------------------------------------------------  IN:    IV PiggyBack: 300 mL    sodium chloride 0.9%: 100 mL  Total IN: 400 mL    OUT:  Total OUT: 0 mL    Total NET: 400 mL        LABS:                        11.9   5.10  )-----------( 233      ( 11 Oct 2020 18:27 )             37.7     10-    137  |  98  |  34.0<H>  ----------------------------<  133<H>  4.7   |  26.0  |  1.04    Ca    9.0      11 Oct 2020 18:27  Mg     2.2     10-11    TPro  5.8<L>  /  Alb  3.2<L>  /  TBili  1.2  /  DBili  x   /  AST  15  /  ALT  8   /  AlkPhos  79  10-11      CARDIAC MARKERS ( 11 Oct 2020 18:27 )  x     / <0.01 ng/mL / x     / x     / x          CAPILLARY BLOOD GLUCOSE      POCT Blood Glucose.: 125 mg/dL (11 Oct 2020 18:01)    PT/INR - ( 11 Oct 2020 18:27 )   PT: 14.6 sec;   INR: 1.27 ratio         PTT - ( 11 Oct 2020 18:27 )  PTT:33.5 sec  Urinalysis Basic - ( 11 Oct 2020 18:52 )    Color: Yellow / Appearance: Clear / S.020 / pH: x  Gluc: x / Ketone: Trace  / Bili: Small / Urobili: 8 mg/dL   Blood: x / Protein: 30 mg/dL / Nitrite: Negative   Leuk Esterase: Small / RBC: 3-5 /HPF / WBC 3-5   Sq Epi: x / Non Sq Epi: Occasional / Bacteria: Few      CULTURES:  Rapid RVP Result: NotDetec (10-11-20 @ 18:45)      Physical Examination:    General: elderly, frail male, in acute respiratory distress    HEENT: Pupils equal, reactive to light.  Symmetric.    PULM: Rhonchi bilaterally, no wheeze, audible secretions in upper airway     CVS: Regular rate and rhythm, no murmurs, rubs, or gallops    ABD: Soft, nondistended, nontender, normoactive bowel sounds, no masses    EXT: No edema, nontender    SKIN: Warm and well perfused, no rashes noted.    NEURO: A&Ox3, strength 5/5 all extremities, cranial nerves grossly intact, no focal deficits      RADIOLOGY: CXR with b/l infiltrates       CRITICAL CARE TIME SPENT: 60 minutes assessing presenting problems of acute illness, which pose high probability of life threatening deterioration or end organ damage/dysfunction, as well as medical decision making including initiating plan of care, reviewing data, reviewing radiologic exams, discussing with multidisciplinary team,  discussing goals of care with patient/family, and writing this note.  Non-inclusive of procedures performed,

## 2020-10-12 NOTE — ED ADULT NURSE REASSESSMENT NOTE - NS ED NURSE REASSESS COMMENT FT1
pt brought to [facility] with 2 rn's, rt, and pa on cm and . No incidents during transport or at arrival to unit. icu rn at pt bedside on pt arrival.
pt remains hypotensive, dr newman made aware, ivf infusing slow per orders.  Radha RN
endorsed to HR RN.  vitals stable comfort/safety maintained, will cont to monitor  Radha, RN
pt switched from bipap to 40% venti mask and tolerating well, BP improved, spo2 maintained >92%.  no acute distress comfort/safety maintained, will cont to monitor  RICHY Garcia

## 2020-10-12 NOTE — PROCEDURE NOTE - NSINDICATIONS_GEN_A_CORE
critical illness
critical patient
critical patient/mental status change/respiratory distress/respiratory failure/airway protection

## 2020-10-12 NOTE — CONSULT NOTE ADULT - ATTENDING COMMENTS
COUNSELING:    Face to face meeting to discuss Advanced Care Planning - Time Spent __20____ Minutes.  See goals of care note.    More than 50% time spent in counseling and coordinating care. __30____ Minutes.     Thank you for the opportunity to assist with the care of this patient.   [place] [facility] [phone].

## 2020-10-12 NOTE — PROGRESS NOTE ADULT - SUBJECTIVE AND OBJECTIVE BOX
Patient's sob/wob progressively worsened this afternoon, ultimately the decision was make to intubate the patient for respiratory distress/hypoxic respiratory failure.  During intubation patient with massive aspiration event.  Post intubation patient progressed to ARDS with fio2 100%, peep 12 with increasing requirements.  Tentative plan for urgent bronch in process of gathering supplies patient with bradycardic --> Patient's sob/wob progressively worsened this afternoon, "I don't feel well, I feel weired", patient becoming more lethargic and confused, abdominal breathing worsening and ultimately the decision was make to intubate the patient for respiratory distress/hypoxic respiratory failure.  During intubation patient with massive aspiration event.  Post intubation patient progressed to ARDS with fio2 100%, peep 12 with rapidly increasing requirements.  OG Tube placed with 2L of yellowish bowel content output. Tentative plan for urgent bronch in process of gathering supplies patient with bradycardic -->asystolic arrest.  Patient with total down time ~15minutes, 6 rounds of epi, 3amp bicarb, 2 cacl, 150mg ivp amio, lidocaine x1, shocked 3 times for vfib when ROSC achieved.  Escalating levophed requirements post ROSC, epinephrine gtt ordered, additional amp bicarb and bicarb gtt ordered.      Several attempts to contact family, no answer, messages left.      Critical Care time: 90 mins assessing presenting problems of acute illness that poses high probability of life threatening deterioration or end organ damage/dysfunction.  Medical decision making including Initiating plan of care, reviewing data, reviewing radiology, discussing with multidisciplinary team, non inclusive of procedures, discussing goals of care with patient/family

## 2020-10-12 NOTE — CONSULT NOTE ADULT - SUBJECTIVE AND OBJECTIVE BOX
This is a frail elderly 78 yo M PMH of AFIB on Elliquis VTACH-AICD placement, CHF due to Cardiomyopathy, CAD PCI Stroke OA, HLD, Chronic Hypotension and COPD. Admitted to [facility] with CHF-EF <20%, HCAP with Acute Respiratory Failure-COPD Exacerbation increasing SOB and fatigue  had vomited. Code Sepsis called in ED with suspected PNA. Stage  IV R Buttock Ulcer. Patient is extremely tired, labored breathing on HI Frank  Oxygenation at 80%. Having difficulty speaking without losing his breath. Moist raspy voice. He is on Levophed for Hypotension and Midodrine  I was able to have a GOC conversation with him, and establish his HCP ( see separate note)  HPI:  80 y/o male with history of CAD s/p PCI , ICM with EF <20%, multiple episodes of VT requiring VT ablation, AICD and CRT, most recent was at [facility] on  where he said the ablation went without any complications and he was sent home, Depression, CVA, severe OA wheelchair bound, right buttock Stage 4 ulcer being care for by wound care, chronic hypotension with SBP 80-90 on Midodrine, Afib on eliquis. Patient BIBEMS tonight after he called for increase SOB and fatigue. He states his symptoms started on Friday and got progressively worse. Denies obvious sick contacts, travel, CP, HA, fever, chills or diarrhea. Said he felt nauseous early this morning and had 2 episodes of nonbloody nonbilious vomit. Denies any dysphagia symptoms. EMS noted SBP in 70s, and hypoxia in 80s. Placed on NRB, given nebs, mag, steroids, and 500ml bolus. In the ED transitioned to NC, code sepsis called for tachypnea, hypoxia, bandemia, and suspected PNA as source of infection. Given an additional 1l NS in ED as well as scheduled dose of Midodrine, and BP at baseline now. Did not get full 30ml/kg with EF <20 and risk of pulmonary edema and worsening respiratory failure leading to likely intubation. Pan cultured, given Abx. Also noted to have stage 4 ulcer over right buttock and skin ulcer on medial plantar aspect of right foot.  (11 Oct 2020 22:18)    PERTINENT PMH REVIEWED: Yes     PAST MEDICAL & SURGICAL HISTORY:  Afib  Ventricular tachycardia  Congestive heart failure due to cardiomyopathy  CAD in native artery  Stroke  Osteoarthritis  Hyperlipidemia  Depression  HTN (hypertension)  S/P stroke due to cerebrovascular disease    SOCIAL HISTORY:  EtOH   No                              Drugs  No                             nonsmoker                              Admitted from: home HCP  Sister Maite Gibbs Phone#:[phone]    FAMILY HISTORY:  Family history unknown    No Known Allergies    Baseline ADLs (prior to admission):  Independent     Present Symptoms:     Dyspnea: Acute Respiratory Failure with Hypoxia  Nausea/Vomiting: No  Anxiety:  Yes   Depression: Yes   Fatigue: Yes  Loss of appetite: Yes     Pain: denies            Character-            Duration-            Effect-            Factors-            Frequency-            Location-            Severity-    Review of Systems: Reviewed                       All others negative    MEDICATIONS  (STANDING):  acetylcysteine 20%  Inhalation 1 milliLiter(s) Inhalation every 6 hours  ALBUTerol    90 MICROgram(s) HFA Inhaler 1 Puff(s) Inhalation every 4 hours  albuterol/ipratropium for Nebulization 3 milliLiter(s) Nebulizer every 6 hours  apixaban 5 milliGRAM(s) Oral every 12 hours  atorvastatin 20 milliGRAM(s) Oral at bedtime  buPROPion XL . 150 milliGRAM(s) Oral daily  clopidogrel Tablet 75 milliGRAM(s) Oral daily  midodrine. 10 milliGRAM(s) Oral three times a day  norepinephrine Infusion 0.05 MICROgram(s)/kG/Min (9.14 mL/Hr) IV Continuous <Continuous>  piperacillin/tazobactam IVPB.. 3.375 Gram(s) IV Intermittent every 8 hours  saccharomyces boulardii 250 milliGRAM(s) Oral two times a day  tamsulosin 0.4 milliGRAM(s) Oral at bedtime  tiotropium 18 MICROgram(s) Capsule 1 Capsule(s) Inhalation daily    MEDICATIONS  (PRN):  ondansetron Injectable 4 milliGRAM(s) IV Push every 6 hours PRN Nausea and/or Vomiting    PHYSICAL EXAM: Due to the nature of this patient's COVID-19 isolation status, no bedside physical exam was done to limit spread of infection. Examination highlights were provided by bedside nurse and primary team. Objective data were reviewed in detail.    LABS:                        11.6   6.44  )-----------( 194      ( 12 Oct 2020 06:41 )             36.0     10-12    136  |  98  |  52.0<H>  ----------------------------<  119<H>  4.9   |  23.0  |  1.19    Ca    8.9      12 Oct 2020 06:44  Phos  4.2     10-12  Mg     1.9     10-12    TPro  5.8<L>  /  Alb  3.2<L>  /  TBili  1.2  /  DBili  x   /  AST  15  /  ALT  8   /  AlkPhos  79  10-11    PT/INR - ( 11 Oct 2020 18:27 )   PT: 14.6 sec;   INR: 1.27 ratio       PTT - ( 11 Oct 2020 18:27 )  PTT:33.5 sec  Urinalysis Basic - ( 11 Oct 2020 18:52 )  Color: Yellow / Appearance: Clear / S.020 / pH: x  Gluc: x / Ketone: Trace  / Bili: Small / Urobili: 8 mg/dL   Blood: x / Protein: 30 mg/dL / Nitrite: Negative   Leuk Esterase: Small / RBC: 3-5 /HPF / WBC 3-5   Sq Epi: x / Non Sq Epi: Occasional / Bacteria: Few    I&O's Summary    11 Oct 2020 07:01  -  12 Oct 2020 07:00  --------------------------------------------------------  IN: 418.2 mL / OUT: 0 mL / NET: 418.2 mL    12 Oct 2020 07:01  -  12 Oct 2020 15:05  --------------------------------------------------------  IN: 295.6 mL / OUT: 250 mL / NET: 45.6 mL    RADIOLOGY & ADDITIONAL STUDIES:  < from: Xray Chest 1 View- PORTABLE-Urgent (Xray Chest 1 View- PORTABLE-Urgent .) (10.12.20 @ 07:04) >    Findings:  The heart is not enlarged. No hilar or mediastinal abnormality. There is a pacemaker with 3 leads, unchanged. Bilateral airspace disease unchanged.    Impression:  No change since the prior exam..  < end of copied text >    ADVANCE DIRECTIVES:   DNR NO  Completed on:                     MOLST  NO   Completed on:  Living Will  NO   Completed on:

## 2020-10-13 NOTE — DIETITIAN INITIAL EVALUATION ADULT. - MALNUTRITION
Severe-Chronic; NFPE: Moderate muscle mass loss in temple, clavicle and shoulder regions and moderate fat loss in orbital region (3+ edema noted to scrotum) Severe-chronic

## 2020-10-13 NOTE — DIETITIAN INITIAL EVALUATION ADULT. - PERTINENT LABORATORY DATA
10-13 Na141 mmol/L Glu 218 mg/dL<H> K+ 3.2 mmol/L<L> Cr  2.20 mg/dL<H> BUN 75.0 mg/dL<H> Phos 5.0 mg/dL<H> Alb n/a   PAB n/a     n/a  HgbA1C

## 2020-10-13 NOTE — PROVIDER CONTACT NOTE (EICU) - SITUATION
78 yo male with hx of CAD, CHFrEF, ICD, VT ablation, afib on eliquis, decub ulcer, admitted with sepsis and hypotension, responded to fluid bolus and admitted to medical floor.  Vomited and likely aspirated, required high flow nasal cannula.
Contacted Vicki re: expiration (#[phone]). Spoke w/ Colleen. Told to tell the bedside that d/t dx of HCAP and severe sepsis that LONY is rule out for tissue referral.
GCS:  3

## 2020-10-13 NOTE — PROGRESS NOTE ADULT - ASSESSMENT
This 80 y/o male with history of CAD s/p PCI 6/20, ICM with EF <20%, multiple episodes of VT requiring VT ablation, AICD and CRT, most recent was at [facility] on 9/29 where he said the ablation went without any complications and he was sent home, Depression, CVA, severe OA wheelchair bound, right buttock Stage 4 ulcer being care for by wound care, chronic hypotension with SBP 80-90 on Midodrine, Afib on Eliquis Patient BIBEMS tonight after he called for increase SOB and fatigue. He states his symptoms started on Friday and got progressively worse. Denies obvious sick contacts, travel, CP, HA, fever, chills or diarrhea. Said he felt nauseous early this morning and had 2 episodes of nonbloody nonbilious vomit. Denies any dysphagia symptoms. EMS noted SBP in 70s, and hypoxia in 80s. Placed on NRB, given nebs, mag, steroids, and 500ml bolus. In the ED transitioned to NC, code sepsis called for tachypnea, hypoxia, bandemia, and suspected PNA as source of infection. Given an additional 1l NS in ED as well as scheduled dose of Midodrine, and BP at baseline now. Did not get full 30ml/kg with EF <20 and risk of pulmonary edema and worsening respiratory failure leading to likely intubation. Pan cultured, given Abx. Also noted to have stage 4 ulcer over right buttock and skin ulcer on medial plantar aspect of right foot.  (11 Oct 2020 22:18)    patient was found to have bilateral airspace disease on imaging.  Due to his decompensation, he was transferred from the Medical service to the MICU.   patient can provide very little information.   RVP and COVID PCR is negative      Impression:  - bilateral pneumonia  - shortness of breath  - hypoxia    Plan:  - had been on empiric piperacillin/tazobactam   now on COMFORT measures.    Antibiotics stopped by primary team      - will sign off at this time.

## 2020-10-13 NOTE — PROGRESS NOTE ADULT - SUBJECTIVE AND OBJECTIVE BOX
[facility]  INFECTIOUS DISEASES AND INTERNAL MEDICINE at [place]  =======================================================  Andrey Johnson MD  Diplomates American Board of Internal Medicine and Infectious Diseases  Tel  [phone]  Fax [phone]  =======================================================    [id number]  SASKIA JADA  follow up: bilateral airspace infiltrates, possible PNA    events overnight reviewed:  Intubated after pt went into worsening respiratory failure. Cardiac arrest asystolic/ bradycardic --> VT/VF s/p DCCV x 3.   Has been on 2 pressor shock and vent dependant. On fixed dose Levo @ 1mcg and Epi @ 1.2mcg  Unresponsive off sedation     patient seen in ICU this AM.   remains in pressors.   BP on arterial line noted.      =======================================================  REVIEW OF SYSTEMS:  Limited due to medical condition    =======================================================  Allergies  No Known Allergies  Intolerances       ======================================================  Physical Exam:  ============    General:  sedated,   Eye: Pupils are equal, round and reactive to light,   HENT:  INTUBATED on VENT  Neck: Supple,   Respiratory: Lungs with COARSE breath sounds anteriorly  Cardiovascular: Normal rate, Regular rhythm,  Poor pulses  Gastrointestinal: Soft,  Non-distended, Normal bowel sounds.  Lymphatics: No lymphadenopathy neck,   Musculoskeletal: no joint abnl  Integumentary: No rash.  COOL extremities  Neurologic: sedated     =======================================================   Vitals:  ============  T(F): 100.6 (13 Oct 2020 10:00), Max: 102.2 (12 Oct 2020 21:00)  HR: 115 (13 Oct 2020 12:35)  BP: 79/50 (13 Oct 2020 10:15)  RR: 35 (13 Oct 2020 12:30)  SpO2: 87% (13 Oct 2020 12:35) (86% - 100%)  temp max in last 48H T(F): , Max: 102.2 (10-12-20 @ 21:00)    =======================================================  Current Antibiotics:    Other medications:  albuterol/ipratropium for Nebulization 3 milliLiter(s) Nebulizer every 6 hours  chlorhexidine 0.12% Liquid 15 milliLiter(s) Oral Mucosa every 12 hours  EPINEPHrine    Infusion 0.8 MICROgram(s)/kG/Min IV Continuous <Continuous>  lactated ringers. 1000 milliLiter(s) IV Continuous <Continuous>  norepinephrine Infusion 0.05 MICROgram(s)/kG/Min IV Continuous <Continuous>  pantoprazole  Injectable 40 milliGRAM(s) IV Push daily      =======================================================  Labs:                        11.1   2.70  )-----------( 121      ( 13 Oct 2020 03:12 )             35.1      10-13    141  |  99  |  75.0<H>  ----------------------------<  218<H>  3.2<L>   |  26.0  |  2.20<H>    Ca    7.7<L>      13 Oct 2020 03:12  Phos  5.0     10-13  Mg     2.1     10-13    TPro  5.2<L>  /  Alb  2.7<L>  /  TBili  0.9  /  DBili  x   /  AST  28  /  ALT  11  /  AlkPhos  62  10-12      Culture - Urine (collected 10-12-20 @ 01:24)  Source: .Urine Clean Catch (Midstream)  Final Report (10-13-20 @ 10:00):    No growth      Creatinine, Serum: 2.20 mg/dL (10-13-20 @ 03:12)  Creatinine, Serum: 1.80 mg/dL (10-12-20 @ 19:16)  Creatinine, Serum: 1.19 mg/dL (10-12-20 @ 06:44)  Creatinine, Serum: 1.04 mg/dL (10-11-20 @ 18:27)      WBC Count: 2.70 K/uL (10-13-20 @ 03:12)  WBC Count: 6.84 K/uL (10-12-20 @ 19:16)  WBC Count: 6.44 K/uL (10-12-20 @ 06:41)  WBC Count: 5.10 K/uL (10-11-20 @ 18:27)    Rapid RVP Result: NotDetec (10-11-20 @ 18:45)    COVID-19 PCR: NotDetec (10-11-20 @ 18:35)      Alkaline Phosphatase, Serum: 62 U/L (10-12-20 @ 19:16)  Alkaline Phosphatase, Serum: 79 U/L (10-11-20 @ 18:27)  Alanine Aminotransferase (ALT/SGPT): 11 U/L (10-12-20 @ 19:16)  Alanine Aminotransferase (ALT/SGPT): 8 U/L (10-11-20 @ 18:27)  Aspartate Aminotransferase (AST/SGOT): 28 U/L (10-12-20 @ 19:16)  Aspartate Aminotransferase (AST/SGOT): 15 U/L (10-11-20 @ 18:27)  Bilirubin Total, Serum: 0.9 mg/dL (10-12-20 @ 19:16)  Bilirubin Total, Serum: 1.2 mg/dL (10-11-20 @ 18:27)

## 2020-10-13 NOTE — PROGRESS NOTE ADULT - NUTRITIONAL ASSESSMENT
This patient has been assessed with a concern for Malnutrition and has been determined to have a diagnosis/diagnoses of Severe protein-calorie malnutrition.    This patient is being managed with:   Diet NPO-  Except Medications  Entered: Oct 12 2020  5:48AM    
This patient has been assessed with a concern for Malnutrition and has been determined to have a diagnosis/diagnoses of Severe protein-calorie malnutrition.    This patient is being managed with:   Diet NPO-  Except Medications  Entered: Oct 12 2020  5:48AM

## 2020-10-13 NOTE — PROGRESS NOTE ADULT - SUBJECTIVE AND OBJECTIVE BOX
Patient is a 79y old  Male who presents with a chief complaint of SOB/Cough  N/V (12 Oct 2020 20:43)    BRIEF HOSPITAL COURSE:   80 y/o male with hypoxic respiratory failure due to likely HCAP, severe sepsis, severe ICM w/ EF <20%, CAD s/p recent PCI, VT s/p multiple ablations, s/p AICD/CRT, CVA, chronic hypotension, stage 4 right buttock pressure ulcer, right plantar foot ulcer, depression, Afib on eliquis, old CVA, wheelchair bound admitted w/ hypoxic resp failure/ aspiration PNA    Events last 24 hours:   Intubated after pt went into worsening respiratory failure. Cardiac arrest asystolic/ bradycardic --> VT/VF s/p DCCV x 3.   Has been on 2 pressor shock and vent dependant. On fixed dose Levo @ 1mcg and Epi @ 1.2mcg  Unresponsive off sedation     PAST MEDICAL & SURGICAL HISTORY:  Afib    Ventricular tachycardia    Congestive heart failure due to cardiomyopathy    CAD in native artery    Stroke    Osteoarthritis    Hyperlipidemia    Depression    HTN (hypertension)    S/P stroke due to cerebrovascular disease      Review of Systems:    Unable to assess given mentation     Physical Examination:    ICU Vital Signs Last 24 Hrs  T(C): 37.7 (13 Oct 2020 08:00), Max: 39 (12 Oct 2020 21:00)  T(F): 99.9 (13 Oct 2020 08:00), Max: 102.2 (12 Oct 2020 21:00)  HR: 115 (13 Oct 2020 08:45) (0 - 115)  BP: 84/54 (13 Oct 2020 08:45) (71/40 - 151/67)  BP(mean): 64 (13 Oct 2020 08:45) (51 - 97)  ABP: 83/45 (13 Oct 2020 08:45) (68/43 - 153/59)  ABP(mean): 55 (13 Oct 2020 08:45) (47 - 79)  RR: 35 (13 Oct 2020 08:45) (13 - 147)  SpO2: 92% (13 Oct 2020 08:45) (86% - 100%)      Neuro: Unresponsive off sedation. Breaths over ventilator. No gag/ cough    HEENT: Pupils equal, sluggish, Dry oral mucosa    PULM: Clear to auscultation bilaterally, no significant adventitious breath sounds     CVS: Regular rhythm and controlled rate, no murmurs, rubs, or gallops    ABD: Soft, nondistended, nontender, normoactive bowel sounds    EXT: No b/l LE edema, nontender with pedal pulse palpable     SKIN: Warm and well perfused, no acute rashes       Medications:  piperacillin/tazobactam IVPB.. 3.375 Gram(s) IV Intermittent every 8 hours    EPINEPHrine    Infusion 0.8 MICROgram(s)/kG/Min IV Continuous <Continuous>  midodrine 10 milliGRAM(s) Oral every 8 hours  norepinephrine Infusion 0.05 MICROgram(s)/kG/Min IV Continuous <Continuous>    albuterol/ipratropium for Nebulization 3 milliLiter(s) Nebulizer every 6 hours        apixaban 5 milliGRAM(s) Oral every 12 hours  clopidogrel Tablet 75 milliGRAM(s) Oral daily              chlorhexidine 0.12% Liquid 15 milliLiter(s) Oral Mucosa every 12 hours        Mode: AC/ CMV (Assist Control/ Continuous Mandatory Ventilation)  RR (machine): 32  TV (machine): 450  FiO2: 100  PEEP: 15  MAP: 21  PIP: 26      I&O's Detail    12 Oct 2020 07:01  -  13 Oct 2020 07:00  --------------------------------------------------------  IN:    EPINEPHrine: 2628 mL    IV PiggyBack: 250 mL    IV PiggyBack: 300 mL    Norepinephrine: 2378 mL    Sodium Bicarbonate: 2750 mL  Total IN: 8306 mL    OUT:    Indwelling Catheter - Urethral (mL): 160 mL    Nasogastric/Oral tube (mL): 1300 mL    Voided (mL): 350 mL  Total OUT: 1810 mL    Total NET: 6496 mL      13 Oct 2020 07:01  -  13 Oct 2020 09:12  --------------------------------------------------------  IN:    EPINEPHrine: 219 mL    Norepinephrine: 183 mL  Total IN: 402 mL    OUT:  Total OUT: 0 mL    Total NET: 402 mL            RADIOLOGY/ Microbiology/ Labs: reviewed

## 2020-10-13 NOTE — PROGRESS NOTE ADULT - ATTENDING COMMENTS
COUNSELING:    Face to face meeting to discuss Advanced Care Planning - Time Spent __30____ Minutes.  See goals of care note.    More than 50% time spent in counseling and coordinating care. _30____ Minutes.     Thank you for the opportunity to assist with the care of this patient.   [place] [facility] [phone]. COUNSELING:    Face to face meeting to discuss Advanced Care Planning - Time Spent __30____ Minutes.  See goals of care note.    More than 50% time spent in counseling and coordinating care. _60____ Minutes.     Thank you for the opportunity to assist with the care of this patient.   [place] [facility] [phone].

## 2020-10-13 NOTE — PROGRESS NOTE ADULT - ASSESSMENT
80 yo M with Acute Respiratory Failure, Intubated VENT Pressor support  S/P Cardiac arrest last evening    Acute Respiratory Failure  Vent withdrawal planned  Pressor support will be discontinued  Dilaudid 1mg IVP, Ativan IV before removing life support    S/P Cardiac Arrest  End stage Cardiomyopathy EF < 20 %  HCAP severe sepsis  AICD insitu- will be disabled by Cardiac team  Allow a natural death    GOC  MOLST voided and new form completed  See GOC conversation with his sister Maite  She agreed to DC Pressor support, turn off AICD and DC vent support  Allow a natural death 80 yo M with Acute Respiratory Failure, Intubated VENT Pressor support  S/P Cardiac arrest last evening    Acute Respiratory Failure  Vent withdrawal planned  Pressor support will be discontinued  Dilaudid 1mg IVP, Ativan IV before removing life support    S/P Cardiac Arrest  End stage Cardiomyopathy EF < 20 %  HCAP severe sepsis  AICD insitu- will be disabled by Cardiac team  Allow a natural death    GOC  MOLST voided and new form completed  See GOC conversation with his sister Maite  She agreed to DC Pressor support, turn off AICD and DC vent support  Recommend Dilaudid 1mg IVP, A  Ativan IVP and Robinul IV before extubation  Allow a natural death DNR/Do not reintubate

## 2020-10-13 NOTE — PROVIDER CONTACT NOTE (EICU) - ASSESSMENT
Aspiration pneumonitis  CHFrEF  Afib  Acute respiratory failure requiring bipap.
Patient's GCS 3, intubated, unresponsive, not on sedation.

## 2020-10-13 NOTE — GOALS OF CARE CONVERSATION - ADVANCED CARE PLANNING - WHAT MATTERS MOST
Getting back home
I called sister Maite, left a voicemail for her to return my call.  That I would update her on his condition and share any information she should know.
That he not suffer anymore  Be allowed to have a natural death

## 2020-10-13 NOTE — PROGRESS NOTE ADULT - SUBJECTIVE AND OBJECTIVE BOX
INTERVAL HPI/OVERNIGHT EVENTS: 78 yo Male Patient PMH of COPD, with acute exacerbation, End Stage Cardiomyopathy with low EF. Patient was in Acute Respiratory Failure needing to be intubated and put on Vent support  during which he had a cardiac arrest, and is now on full pressor support, unresponsive with likely anoxic injury.  F/U Note  Events of past 24 hrs appreciated  Patient eyes  are open, but are vacant and not focusing  Not moving his extremities, or following commands  I called his sister Maite to discuss critical condition and GOC  (see separate documentation)    79y old  Male who presents with a chief complaint of SOB/Cough  N/V (13 Oct 2020 11:21)      Present Symptoms:     Dyspnea: VENTilator support   Nausea/Vomiting: vomited during intubation  Anxiety:  Yes   Unresponsive comatose following cardiac arrest    Pain:             Character-            Duration-            Effect-            Factors-            Frequency-            Location-            Severity-    Review of Systems: Reviewed                       Unable to obtain due to poor mentation       MEDICATIONS  (STANDING):  albuterol/ipratropium for Nebulization 3 milliLiter(s) Nebulizer every 6 hours  chlorhexidine 0.12% Liquid 15 milliLiter(s) Oral Mucosa every 12 hours  clopidogrel Tablet 75 milliGRAM(s) Oral daily  EPINEPHrine    Infusion 0.8 MICROgram(s)/kG/Min (146 mL/Hr) IV Continuous <Continuous>  hydrocortisone sodium succinate Injectable 50 milliGRAM(s) IV Push every 8 hours  lactated ringers. 1000 milliLiter(s) (75 mL/Hr) IV Continuous <Continuous>  midodrine 10 milliGRAM(s) Oral every 8 hours  norepinephrine Infusion 0.05 MICROgram(s)/kG/Min (9.14 mL/Hr) IV Continuous <Continuous>  pantoprazole  Injectable 40 milliGRAM(s) IV Push daily  piperacillin/tazobactam IVPB.. 3.375 Gram(s) IV Intermittent every 12 hours    MEDICATIONS  (PRN):    LABS:                        11.1   2.70  )-----------( 121      ( 13 Oct 2020 03:12 )             35.1     10-13    141  |  99  |  75.0<H>  ----------------------------<  218<H>  3.2<L>   |  26.0  |  2.20<H>    Ca    7.7<L>      13 Oct 2020 03:12  Phos  5.0     10-13  Mg     2.1     10-13    TPro  5.2<L>  /  Alb  2.7<L>  /  TBili  0.9  /  DBili  x   /  AST  28  /  ALT  11  /  AlkPhos  62  10-12    PT/INR - ( 13 Oct 2020 03:14 )   PT: 17.8 sec;   INR: 1.57 ratio         PTT - ( 13 Oct 2020 03:14 )  PTT:39.2 sec  Urinalysis Basic - ( 11 Oct 2020 18:52 )    Color: Yellow / Appearance: Clear / S.020 / pH: x  Gluc: x / Ketone: Trace  / Bili: Small / Urobili: 8 mg/dL   Blood: x / Protein: 30 mg/dL / Nitrite: Negative   Leuk Esterase: Small / RBC: 3-5 /HPF / WBC 3-5   Sq Epi: x / Non Sq Epi: Occasional / Bacteria: Few      I&O's Summary    12 Oct 2020 07:01  -  13 Oct 2020 07:00  --------------------------------------------------------  IN: 8306 mL / OUT: 1810 mL / NET: 6496 mL    13 Oct 2020 07:  -  13 Oct 2020 12:28  --------------------------------------------------------  IN: 1306 mL / OUT: 5 mL / NET: 1301 mL    RADIOLOGY & ADDITIONAL STUDIES:    ADVANCE DIRECTIVES:   DNR YES   Completed on:                     MOLST  YES    Completed on: voided and new form completed 10/13/2020  Living Will  NO   Completed on:

## 2020-10-13 NOTE — GOALS OF CARE CONVERSATION - ADVANCED CARE PLANNING - TREATMENT GUIDELINES
Intubation trial/IV fluid trial/Antibiotic trial/Artificial nutrition trial
Comfort measures only/No antibiotics/No blood draws/No artificial nutrition/DNR Order/Do not re-hospitalize

## 2020-10-13 NOTE — DISCHARGE NOTE FOR THE EXPIRED PATIENT - HOSPITAL COURSE
80 y/o male with history of CAD s/p PCI 6/20, ICM with EF <20%, multiple episodes of VT requiring VT ablation, AICD and CRT, most recent was at [facility] on 9/29 where he said the ablation went without any complications and he was sent home, Depression, CVA, severe OA wheelchair bound, right buttock Stage 4 ulcer admitted with sepsis, aspirated prompting transfer to MICU for hypoxic respiratory failure, patient further deteriorated requiring intubation, experienced aspiration event, went into ARDS/shock followed by cardiac arrest x15 minutes.  ROSC achieved and patient in dual pressor shock/ARDS.  Today patient converted to comfort measures, was palliatively extubated and deteriorated into asystole.

## 2020-10-13 NOTE — PROVIDER CONTACT NOTE (EICU) - RECOMMENDATIONS
- continue abx  - monitor in ICU for signs of respiratory failure  - hold off on diuresis, may be on dry side now.  Discussed with ICU PA
Discussed with bedside RN Leny and agreed with early [facility] [place] referral

## 2020-10-13 NOTE — DIETITIAN INITIAL EVALUATION ADULT. - ETIOLOGY
Related to inability to meet sufficient protein energy requirements in setting of advanced age and healing (st. 4 sacrum pressure injury) and now s/p cardiac arrest. cardiogenic shock, aspiration PNA,

## 2020-10-13 NOTE — GOALS OF CARE CONVERSATION - ADVANCED CARE PLANNING - TREATMENT GUIDELINE COMMENT
Full Code/Full Treat
No limitations on medical interventions
Turn off pressors, AICD medicate with Dilaudid 1mg IVP and Ativan IVP before vent withdrawal

## 2020-10-13 NOTE — PROGRESS NOTE ADULT - ASSESSMENT
Cardiac arrest, ROSC 15min  Cardiogenic/ distributive shock  Acute hypoxic respiratory failure  Anoxic brain injury   Aspiration PNA/pneumonitis  CAD s/p PCI  ICM w/ EF <20%  VT s/p ablations, s/p CRT-D  AF w/ eliquis    Patient seen and examined     Neuro: Unresponsive off sedation. Likely has significant anoxic brain injury after arrest, ROSC ~15min   Cardiovascular: On fixed dose Levo@ 1mcg and Epi@ 1.2mcg. Aim for MAP target 65. Started on Hydrocortisone and midodrine. On Eliquis for AC. Cards f/u   Resp/Chest: Maintain SpO2 > 92%. On Volume AC . Wean per protocol as tolerated and daily SBT. Vent bundle  GI/Nutrition: Hold feeds for now. IV PPI  ID: Covered w/ Zosyn for aspriration. f/u BCx, SCx. Trend LA  Renal: Avoid nephrotoxic agents. Strict I&O  Endocrinology: Maintain Blood sugar < 180. ISS as per protocol  Haem/Oncology:  DVT ppx w/ Eliquis    Critical Care time: 35 minutes assessing presenting problems of acute illness that poses high probability of life threatening deterioration or end organ damage/dysfunction.  Medical decision making including Initiating plan of care, reviewing data, reviewing radiology, discussing with multidisciplinary team, non inclusive of procedures     Cardiac arrest, ROSC 15min  Cardiogenic/ distributive shock  Acute hypoxic respiratory failure  Anoxic brain injury   Aspiration PNA/pneumonitis  CAD s/p PCI  ICM w/ EF <20%  VT s/p ablations, s/p CRT-D  AF w/ eliquis    Patient seen and examined     Neuro: Unresponsive off sedation. Likely has significant anoxic brain injury after arrest, ROSC ~15min   Cardiovascular: On fixed dose Levo@ 1mcg and Epi@ 1.2mcg. Started on Hydrocortisone and midodrine. On Eliquis for AC. Cards f/u   Resp/Chest: Maintain SpO2 > 92%. On Volume AC. No plan to wean at this time. Vent bundle. Duoneb PRN   GI/Nutrition: Hold feeds for now. IV PPI. Maintenance fluids for now  ID: Covered w/ Zosyn for aspiration f/u cultures. Trend LA  Renal: Avoid nephrotoxic agents. Strict I&O. Stopped bicarb gtt  Endocrinology: Maintain Blood sugar < 180. ISS as per protocol  Haem/Oncology:  DVT ppx w/ Eliquis  Dispo: DNR and pt is on a fixed dose pressors. Por prognosis. Very likely to have another cardiac arrest in setting of high pressor support. Palliative care consulted    Critical Care time: 35 minutes assessing presenting problems of acute illness that poses high probability of life threatening deterioration or end organ damage/dysfunction.  Medical decision making including Initiating plan of care, reviewing data, reviewing radiology, discussing with multidisciplinary team, non inclusive of procedures     Cardiac arrest, ROSC 15min  Cardiogenic/ distributive shock  Acute hypoxic respiratory failure  Anoxic brain injury   Aspiration PNA/pneumonitis  CAD s/p PCI  ICM w/ EF <20%  VT s/p ablations, s/p CRT-D  AF w/ eliquis    Patient seen and examined     Neuro: Unresponsive off sedation. Likely has significant anoxic brain injury after arrest, ROSC ~15min   Cardiovascular: On fixed dose Levo@ 1mcg and Epi@ 1.2mcg. Started on Hydrocortisone and midodrine. On Eliquis for AC. Cards f/u   Resp/Chest: Maintain SpO2 > 92%. On Volume /15. No plan to wean at this time. Vent bundle. Duoneb PRN   GI/Nutrition: Hold feeds for now. IV PPI. Maintenance fluids for now  ID: Covered w/ Zosyn for aspiration f/u cultures. Trend LA  Renal: Avoid nephrotoxic agents. Strict I&O. Stopped bicarb gtt  Endocrinology: Maintain Blood sugar < 180. ISS as per protocol  Haem/Oncology:  DVT ppx w/ Eliquis  Dispo: DNR and pt is on a fixed dose pressors. Very poor prognosis. Likely to have another cardiac arrest in setting of high pressor and ventilator support. Palliative care consulted    Critical Care time: 35 minutes assessing presenting problems of acute illness that poses high probability of life threatening deterioration or end organ damage/dysfunction.  Medical decision making including Initiating plan of care, reviewing data, reviewing radiology, discussing with multidisciplinary team, non inclusive of procedures

## 2020-10-13 NOTE — PROGRESS NOTE ADULT - SUBJECTIVE AND OBJECTIVE BOX
[place] [facility], THE [facility]ART [facility]                                   [address] [place] [address]                                                      PHONE: [phone]                                                         FAX: [phone]  [email or web address]  ---------------------------------------------------------------------------------------------------------------------------------    Overnight events/patient complaints: patient seen at bedside. He is intubated and sedated.       No Known Allergies    MEDICATIONS  (STANDING):  albuterol/ipratropium for Nebulization 3 milliLiter(s) Nebulizer every 6 hours  apixaban 5 milliGRAM(s) Oral every 12 hours  chlorhexidine 0.12% Liquid 15 milliLiter(s) Oral Mucosa every 12 hours  clopidogrel Tablet 75 milliGRAM(s) Oral daily  EPINEPHrine    Infusion 0.8 MICROgram(s)/kG/Min (146 mL/Hr) IV Continuous <Continuous>  hydrocortisone sodium succinate Injectable 50 milliGRAM(s) IV Push every 8 hours  lactated ringers. 1000 milliLiter(s) (75 mL/Hr) IV Continuous <Continuous>  midodrine 10 milliGRAM(s) Oral every 8 hours  norepinephrine Infusion 0.05 MICROgram(s)/kG/Min (9.14 mL/Hr) IV Continuous <Continuous>  pantoprazole  Injectable 40 milliGRAM(s) IV Push daily  piperacillin/tazobactam IVPB.. 3.375 Gram(s) IV Intermittent every 12 hours    MEDICATIONS  (PRN):      Vital Signs Last 24 Hrs  T(C): 38.1 (13 Oct 2020 10:00), Max: 39 (12 Oct 2020 21:00)  T(F): 100.6 (13 Oct 2020 10:00), Max: 102.2 (12 Oct 2020 21:00)  HR: 114 (13 Oct 2020 10:15) (0 - 115)  BP: 79/50 (13 Oct 2020 10:15) (71/40 - 151/67)  BP(mean): 60 (13 Oct 2020 10:15) (51 - 97)  RR: 41 (13 Oct 2020 10:15) (13 - 147)  SpO2: 93% (13 Oct 2020 10:15) (86% - 100%)  ICU Vital Signs Last 24 Hrs  SASKIA ELIAS  I&O's Detail    12 Oct 2020 07:01  -  13 Oct 2020 07:00  --------------------------------------------------------  IN:    EPINEPHrine: 2628 mL    IV PiggyBack: 250 mL    IV PiggyBack: 300 mL    Norepinephrine: 2378 mL    Sodium Bicarbonate: 2750 mL  Total IN: 8306 mL    OUT:    Indwelling Catheter - Urethral (mL): 160 mL    Nasogastric/Oral tube (mL): 1300 mL    Voided (mL): 350 mL  Total OUT: 1810 mL    Total NET: 6496 mL      13 Oct 2020 07:01  -  13 Oct 2020 11:21  --------------------------------------------------------  IN:    EPINEPHrine: 657 mL    IV PiggyBack: 100 mL    Norepinephrine: 549 mL  Total IN: 1306 mL    OUT:    Indwelling Catheter - Urethral (mL): 5 mL  Total OUT: 5 mL    Total NET: 1301 mL        Drug Dosing Weight  SASKIA ELIAS  Mode: AC/ CMV (Assist Control/ Continuous Mandatory Ventilation), RR (machine): 32, TV (machine): 450, FiO2: 100, PEEP: 15, MAP: 21, PIP: 26    PHYSICAL EXAM:  General: intubated, sedated  HEENT: Head; normocephalic, atraumatic.  Eyes: Pupils reactive, cornea wnl.  Neck: Supple, no nodes adenopathy, no NVD or carotid bruit or thyromegaly.  CARDIOVASCULAR: Normal S1 and S2, No murmur, rub, gallop or lift. tahcycardic   LUNGS: coarse breath sounds b/l  ABDOMEN: Soft, nontender without mass or organomegaly. bowel sounds normoactive.  EXTREMITIES: No clubbing, cyanosis or edema. Distal pulses wnl.   SKIN: warm and dry with normal turgor.  NEURO: Alert/oriented x 3/normal motor exam. No pathologic reflexes.    PSYCH: normal affect.        LABS:                        11.1   2.70  )-----------( 121      ( 13 Oct 2020 03:12 )             35.1     10-13    141  |  99  |  75.0<H>  ----------------------------<  218<H>  3.2<L>   |  26.0  |  2.20<H>    Ca    7.7<L>      13 Oct 2020 03:12  Phos  5.0     10-13  Mg     2.1     10-13    TPro  5.2<L>  /  Alb  2.7<L>  /  TBili  0.9  /  DBili  x   /  AST  28  /  ALT  11  /  AlkPhos  62  10-12    SASKIA ELIAS  CARDIAC MARKERS ( 12 Oct 2020 19:16 )  x     / 0.02 ng/mL / x     / x     / x      CARDIAC MARKERS ( 11 Oct 2020 18:27 )  x     / <0.01 ng/mL / x     / x     / x          PT/INR - ( 13 Oct 2020 03:14 )   PT: 17.8 sec;   INR: 1.57 ratio         PTT - ( 13 Oct 2020 03:14 )  PTT:39.2 sec  Urinalysis Basic - ( 11 Oct 2020 18:52 )    Color: Yellow / Appearance: Clear / S.020 / pH: x  Gluc: x / Ketone: Trace  / Bili: Small / Urobili: 8 mg/dL   Blood: x / Protein: 30 mg/dL / Nitrite: Negative   Leuk Esterase: Small / RBC: 3-5 /HPF / WBC 3-5   Sq Epi: x / Non Sq Epi: Occasional / Bacteria: Few        RADIOLOGY & ADDITIONAL STUDIES:    INTERPRETATION OF TELEMETRY (personally reviewed): V paced 110 bpm    ASSESSMENT AND PLAN:  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, and recent prolonged hospitalization at [facility] for coronary artery disease, status post myocardial infarction, status post LAD PCI, ischemic cardiomyopathy with an EF of less than 20%, recurrent ventricular tachycardia status post VT ablation, asystolic arrest requiring temporary pacing wire, status post Medtronic biventricular ICD on 2020 and paroxysmal typical atrial flutter, subsequently initiated on full anticoagulation with Eliquis, who underwent comprehensive electrophysiologic study and radiofrequency ablation of his atrial flutter circuit 2020 who now presents with dyspnea after his flu shot found to have hypoxic respiratory failure.    Acute Hypoxic Respiratory Failure, VT/VF Cardiac Arrest, Septic Shock, Lactic Acidosis, Acute Renal Failure, Ischemic Cardiomyopathy, CAD s/p PCI, VT s/p Ablation, Atrial Flutter s/p Ablation -- over the past 24 hours the patient was intubated for worsening acute hypoxic respiratory failure, and developed VT/VF cardiac arrest requiring DCCV x 3. He is currently on Levophed and Epinephrine infusions at high dose. Echo shows known EF 20-25% with no pericardial effusion.   - continue Levophed and Epinephrine and wean as hemodynamics tolerate  - continue to hold all anti-hypertensive and AV jaimee blocking medications  - continue Plavix and can hold Eliquis for now given need for critical care, line insertion, etc  - continue with antibiotics  - vent management per MICU    Prognosis is poor. Patient is now DNR. Will follow with you.        [place] [facility], THE [facility]                                   [address] [place] [address]                                                      PHONE: [phone]                                                         FAX: [phone]  [email or web address]  ---------------------------------------------------------------------------------------------------------------------------------    Overnight events/patient complaints: patient seen at bedside. He is intubated and sedated.       No Known Allergies    MEDICATIONS  (STANDING):  albuterol/ipratropium for Nebulization 3 milliLiter(s) Nebulizer every 6 hours  apixaban 5 milliGRAM(s) Oral every 12 hours  chlorhexidine 0.12% Liquid 15 milliLiter(s) Oral Mucosa every 12 hours  clopidogrel Tablet 75 milliGRAM(s) Oral daily  EPINEPHrine    Infusion 0.8 MICROgram(s)/kG/Min (146 mL/Hr) IV Continuous <Continuous>  hydrocortisone sodium succinate Injectable 50 milliGRAM(s) IV Push every 8 hours  lactated ringers. 1000 milliLiter(s) (75 mL/Hr) IV Continuous <Continuous>  midodrine 10 milliGRAM(s) Oral every 8 hours  norepinephrine Infusion 0.05 MICROgram(s)/kG/Min (9.14 mL/Hr) IV Continuous <Continuous>  pantoprazole  Injectable 40 milliGRAM(s) IV Push daily  piperacillin/tazobactam IVPB.. 3.375 Gram(s) IV Intermittent every 12 hours    MEDICATIONS  (PRN):      Vital Signs Last 24 Hrs  T(C): 38.1 (13 Oct 2020 10:00), Max: 39 (12 Oct 2020 21:00)  T(F): 100.6 (13 Oct 2020 10:00), Max: 102.2 (12 Oct 2020 21:00)  HR: 114 (13 Oct 2020 10:15) (0 - 115)  BP: 79/50 (13 Oct 2020 10:15) (71/40 - 151/67)  BP(mean): 60 (13 Oct 2020 10:15) (51 - 97)  RR: 41 (13 Oct 2020 10:15) (13 - 147)  SpO2: 93% (13 Oct 2020 10:15) (86% - 100%)  ICU Vital Signs Last 24 Hrs  SASKIA ELIAS  I&O's Detail    12 Oct 2020 07:01  -  13 Oct 2020 07:00  --------------------------------------------------------  IN:    EPINEPHrine: 2628 mL    IV PiggyBack: 250 mL    IV PiggyBack: 300 mL    Norepinephrine: 2378 mL    Sodium Bicarbonate: 2750 mL  Total IN: 8306 mL    OUT:    Indwelling Catheter - Urethral (mL): 160 mL    Nasogastric/Oral tube (mL): 1300 mL    Voided (mL): 350 mL  Total OUT: 1810 mL    Total NET: 6496 mL      13 Oct 2020 07:01  -  13 Oct 2020 11:21  --------------------------------------------------------  IN:    EPINEPHrine: 657 mL    IV PiggyBack: 100 mL    Norepinephrine: 549 mL  Total IN: 1306 mL    OUT:    Indwelling Catheter - Urethral (mL): 5 mL  Total OUT: 5 mL    Total NET: 1301 mL        Drug Dosing Weight  SASKIA ELIAS  Mode: AC/ CMV (Assist Control/ Continuous Mandatory Ventilation), RR (machine): 32, TV (machine): 450, FiO2: 100, PEEP: 15, MAP: 21, PIP: 26    PHYSICAL EXAM:  General: intubated, sedated  HEENT: Head; normocephalic, atraumatic.  Eyes: Pupils reactive, cornea wnl.  Neck: Supple, no nodes adenopathy, no NVD or carotid bruit or thyromegaly.  CARDIOVASCULAR: Normal S1 and S2, No murmur, rub, gallop or lift. tahcycardic   LUNGS: coarse breath sounds b/l  ABDOMEN: Soft, nontender without mass or organomegaly. bowel sounds normoactive.  EXTREMITIES: No clubbing, cyanosis or edema. Distal pulses wnl.   SKIN: warm and dry with normal turgor.  NEURO: Alert/oriented x 3/normal motor exam. No pathologic reflexes.    PSYCH: normal affect.        LABS:                        11.1   2.70  )-----------( 121      ( 13 Oct 2020 03:12 )             35.1     10-13    141  |  99  |  75.0<H>  ----------------------------<  218<H>  3.2<L>   |  26.0  |  2.20<H>    Ca    7.7<L>      13 Oct 2020 03:12  Phos  5.0     10-13  Mg     2.1     10-13    TPro  5.2<L>  /  Alb  2.7<L>  /  TBili  0.9  /  DBili  x   /  AST  28  /  ALT  11  /  AlkPhos  62  10-12    SASKIA ELIAS  CARDIAC MARKERS ( 12 Oct 2020 19:16 )  x     / 0.02 ng/mL / x     / x     / x      CARDIAC MARKERS ( 11 Oct 2020 18:27 )  x     / <0.01 ng/mL / x     / x     / x          PT/INR - ( 13 Oct 2020 03:14 )   PT: 17.8 sec;   INR: 1.57 ratio         PTT - ( 13 Oct 2020 03:14 )  PTT:39.2 sec  Urinalysis Basic - ( 11 Oct 2020 18:52 )    Color: Yellow / Appearance: Clear / S.020 / pH: x  Gluc: x / Ketone: Trace  / Bili: Small / Urobili: 8 mg/dL   Blood: x / Protein: 30 mg/dL / Nitrite: Negative   Leuk Esterase: Small / RBC: 3-5 /HPF / WBC 3-5   Sq Epi: x / Non Sq Epi: Occasional / Bacteria: Few        RADIOLOGY & ADDITIONAL STUDIES:    INTERPRETATION OF TELEMETRY (personally reviewed): V paced 110 bpm    ASSESSMENT AND PLAN:  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, and recent prolonged hospitalization at [facility] for coronary artery disease, status post myocardial infarction, status post LAD PCI, ischemic cardiomyopathy with an EF of less than 20%, recurrent ventricular tachycardia status post VT ablation, asystolic arrest requiring temporary pacing wire, status post Medtronic biventricular ICD on 2020 and paroxysmal typical atrial flutter, subsequently initiated on full anticoagulation with Eliquis, who underwent comprehensive electrophysiologic study and radiofrequency ablation of his atrial flutter circuit 2020 who now presents with dyspnea after his flu shot found to have hypoxic respiratory failure.    Acute Hypoxic Respiratory Failure, VT/VF Cardiac Arrest, Septic Shock, Lactic Acidosis, Acute Renal Failure, Ischemic Cardiomyopathy, CAD s/p PCI, VT s/p Ablation, Atrial Flutter s/p Ablation -- over the past 24 hours the patient was intubated for worsening acute hypoxic respiratory failure, and developed VT/VF cardiac arrest requiring DCCV x 3. He is currently on Levophed and Epinephrine infusions at high dose. Echo shows known EF 20-25% with no pericardial effusion.   - continue Levophed and Epinephrine and wean as hemodynamics tolerate  - continue to hold all anti-hypertensive and AV jaimee blocking medications  - continue Plavix and can hold Eliquis for now given need for critical care, line insertion, etc  - continue with antibiotics  - vent management per MICU    Prognosis is poor. Patient is now DNR. Will follow with you.     35 min critical care time spent examining the patient, reviewing pertinent labs and imaging, and discussing with the ICU staff.

## 2020-10-13 NOTE — DIETITIAN INITIAL EVALUATION ADULT. - OTHER INFO
78 y/o male with history of CAD s/p PCI 6/20, ICM with EF <20%, multiple episodes of VT requiring VT ablation, AICD and CRT, most recent was at [facility] on 9/29 where he said the ablation went without any complications and he was sent home, Depression, CVA, severe OA wheelchair bound, right buttock Stage 4 ulcer being care for by wound care, chronic hypotension with SBP 80-90 on Midodrine, Afib on eliquis. Patient BIBEMS tonight after he called for increase SOB and fatigue. He states his symptoms started on Friday and got progressively worse. Upon admission pt reported feeling nauseous early this morning and had 2 episodes of nonbloody nonbilious vomit.  code sepsis called for tachypnea, hypoxia, bandemia, and suspected PNA as source of infection. on 10/12 patient had worsening resp failure with increased WOB, hypotension requiring increased levophed-> Intubated (during which patient had vomiting  and probable aspiration event with no vomitus at cords during ETT inserion)-> 10 min following which, patient underwent hypoxia driven bradycardia brief asystole and then Vfib/V tach cardiac arrest with one shock from ICD and 3 shocks of biphasic 360 joules along with 300 of Amio bolus, 6 rounds of Epi with 2 Amp of Ca, one amp of Mag, one amp Lido for this 15 min Cardiac arrest following which ROSC obtained but now requiring increasing amount of Epi and levophed drip with increasing O2 requirement. Pt remains on vent support at this time, NPO status, NG to suction.   Pt noted to have stage 4 ulcer over right buttock and skin ulcer on medial plantar aspect of right foot.   Unable to interview pt at this time, Brief NFPE conducted

## 2020-10-13 NOTE — GOALS OF CARE CONVERSATION - ADVANCED CARE PLANNING - CONVERSATION DETAILS
Diagnosis, treatment plan, risk of decompensation and need for intubation. Patient AAOX4 and well aware of how advanced his heart disease is and risk of death. He wants to continue to have all therapeutic measures performed including intubation and or CPR  if needed.
Patient very symptomatic at time of this discussion. \On Hi Frank oxygenation at 80% and having trouble speaking.    I introduced myself, and role of Palliative Care NP in Hospital.    I reviewed his present hospitalization course, and he admits he is getting very tired- Work of breathing very hard.    I acknowledged, that he has spoken to his Doctors that he would want to be intubated and put on VENT  Support if/when it becomes necessary.    I then explained that if after being intubated and managed on Vent support, there may be a possibility that he cannot be weaned off Vent  or be able to breathe on his own.  Would he want to have a tracheostomy performed, and continue indefinite vent support?    He asked "will I be able to get around, talk and live on my own? I said no he would be connected to machine to keep him alive.  NO he does not want tracheostomy or chonic vent support.    He did confirm  that his sister Maite Gibbs is his HCP, and he knew I was completing a HCP for his chart  He was able to repeat her address and phone number
I called Tam' sister Maite Gibbs who he had identified as his HCP yesterday.    I discussed events of past 24 hrs.  On life support including Ventilator, Pressor support, AICD  Probability of anoxic brain injury during intubation/CPR    I recalled the conversation I had with Tam yesterday. He did want us to try and resusitate him, agreed to intubation  and vent support. Did not want his life prolonged on life support if he did not have a chance of recovery.    Maite agreed to turn off AICD and Discontinue Pressor support. Withdrawal of VENT if necessary.  She had spoken to their other siblings who agreed, this is what he would want.    Make him comfortable, die with dignity    Family had been physically estranged for some time, but did communicate a few times throughout the year.  She was at peace with this decision on his behalf  .

## 2020-10-16 LAB
CULTURE RESULTS: SIGNIFICANT CHANGE UP
CULTURE RESULTS: SIGNIFICANT CHANGE UP
SPECIMEN SOURCE: SIGNIFICANT CHANGE UP
SPECIMEN SOURCE: SIGNIFICANT CHANGE UP

## 2020-10-19 ENCOUNTER — APPOINTMENT (OUTPATIENT)
Dept: PULMONOLOGY | Facility: CLINIC | Age: 80
End: 2020-10-19

## 2021-07-22 NOTE — ED PROVIDER NOTE - MUSCULOSKELETAL NEGATIVE STATEMENT, MLM
Margy Flores(Attending)
no back pain, no gout, no musculoskeletal pain, no neck pain, and no weakness.

## 2022-02-13 NOTE — DIETITIAN INITIAL EVALUATION ADULT. - NUTRITION DIAGNOSIS
Patients wife called and talked to writer about what is going on. Writer explained the situation and the wife is upset with how the patient is acting. Wife stated \"is he being mean to you guys. \" Wife stated she will be out but is upset with him. yes...

## 2023-02-12 NOTE — DISCHARGE NOTE NURSING/CASE MANAGEMENT/SOCIAL WORK - NSDCPETBCESMAN_GEN_ALL_CORE
If you are a smoker, it is important for your health to stop smoking. Please be aware that second hand smoke is also harmful. MED

## 2023-05-22 NOTE — ED PROVIDER NOTE - SECONDARY DIAGNOSIS.
MD okayed to not have cardiac monitoring or pulse ox monitoring at this time since pt keeps pulling it off. Curtain open with mother holding pt. Mother verbalized understanding that patient cannot walk around in room at this time. She must stay being held or sitting on the bed.   
Respiratory distress

## 2023-07-06 NOTE — DISCHARGE NOTE PROVIDER - CARE PROVIDERS DIRECT ADDRESSES
After obtaining consent, and per orders of Dr. Dada Mitchell, injection of Vitamin B12 1000mcg given in Right deltoid by Jose Griffith LPN. Patient instructed to remain in clinic for 20 minutes afterwards, and to report any adverse reaction to me immediately.
,DirectAddress_Unknown,DirectAddress_Unknown,[email or web address]

## 2024-07-24 NOTE — PROGRESS NOTE ADULT - ASSESSMENT
Detail Level: Generalized HPI/INTERVAL EVENTS: several episodes of VT overnight, some lasting several minutes, asymptomatic.  Lidocaine bolus 50 mg given, rate increased to 3 mg/min.      EXAM:   NAD, alert, oriented  MMM, no JVD  reg rhythm, sinus jadyn  lungs clear  abd soft  trace edema  good cap refill    RADIOLOGY REVIEWED:  cxr clear    IMPRESSION/ASSESSMENT & PLAN:   78 yo male with hx of CAD, CVA, HTN, HLD, ischemic cardiomyopathy, PCI in June 2020 and discharged with life vest, presented to the hospital with sustained VT, successfully chemically cardioverted.  Underwent LHC this admission (7/17) with BENSON placement to LAD.  Now awaiting AICD placement and possible VT ablation.     Ventricular Tachycardia  - lidocaine infusion at 3mg/hr  - continue PO amiodarone  - start mexiletine   Awaiting cardiology/EP decision on VT ablation and AICD    CHFrEF/ ischemic cardiomyopathy  - ASA/Plavix/Statin  - low dose BB with holding parameters   - restarted on low dose ACE    DVT prophylaxis: heparin sc  Diet: Dash TLC  Activity: oob

## 2024-08-15 NOTE — DIETITIAN INITIAL EVALUATION ADULT. - PROBLEM SELECTOR PROBLEM 9
[Alert] : alert [Normal Voice/Communication] : normal voice/communication [Healthy Appearing] : healthy appearing [No Acute Distress] : no acute distress [Sclera] : the sclera and conjunctiva were normal [Hearing Threshold Finger Rub Not [place]] : hearing was normal [Normal Lips/Gums] : the lips and gums were normal [Oropharynx] : the oropharynx was normal [Normal Appearance] : the appearance of the neck was normal [No Neck Mass] : no neck mass was observed [No Respiratory Distress] : no respiratory distress [No Acc Muscle Use] : no accessory muscle use [Respiration, Rhythm And Depth] : normal respiratory rhythm and effort [Auscultation Breath Sounds / Voice Sounds] : lungs were clear to auscultation bilaterally [Heart Rate And Rhythm] : heart rate was normal and rhythm regular [Normal S1, S2] : normal S1 and S2 [Murmurs] : no murmurs [Bowel Sounds] : normal bowel sounds [Abdomen Tenderness] : non-tender [No Masses] : no abdominal mass palpated [Abdomen Soft] : soft [] : no hepatosplenomegaly [Cervical Lymph Nodes Enlarged Posterior Bilaterally] : no posterior cervical lymphadenopathy [Supraclavicular Lymph Nodes Enlarged Bilaterally] : no supraclavicular lymphadenopathy [No CVA Tenderness] : no CVA  tenderness [Cranial Nerves Intact] : cranial nerves 2-12 were intact [Normal] : oriented to person, place, and time [Oriented To Time, Place, And Person] : oriented to person, place, and time [Normal Mood] : the mood was normal [de-identified] : s/p bilateral leg amputation [de-identified] : Patient is in a wheelchair Hyperlipidemia, unspecified hyperlipidemia type

## 2025-07-11 NOTE — ED PROVIDER NOTE - ENMT, MLM
"Lab Results   Component Value Date    HGBA1C 6.7 (H) 07/08/2025       No results for input(s): \"POCGLU\" in the last 72 hours.    Blood Sugar Average: Last 72 hrs:      " No significant past surgical history Airway patent, Nasal mucosa clear. Oral mucosa dry. Throat has no vesicles, no oropharyngeal exudates and uvula is midline.